# Patient Record
Sex: MALE | Race: WHITE | NOT HISPANIC OR LATINO | Employment: UNEMPLOYED | URBAN - METROPOLITAN AREA
[De-identification: names, ages, dates, MRNs, and addresses within clinical notes are randomized per-mention and may not be internally consistent; named-entity substitution may affect disease eponyms.]

---

## 2017-01-06 ENCOUNTER — ALLSCRIPTS OFFICE VISIT (OUTPATIENT)
Dept: OTHER | Facility: OTHER | Age: 52
End: 2017-01-06

## 2017-01-25 ENCOUNTER — ALLSCRIPTS OFFICE VISIT (OUTPATIENT)
Dept: OTHER | Facility: OTHER | Age: 52
End: 2017-01-25

## 2017-02-16 ENCOUNTER — ALLSCRIPTS OFFICE VISIT (OUTPATIENT)
Dept: OTHER | Facility: OTHER | Age: 52
End: 2017-02-16

## 2017-02-16 DIAGNOSIS — K74.60 CIRRHOSIS OF LIVER (HCC): ICD-10-CM

## 2017-02-16 DIAGNOSIS — B19.20 VIRAL HEPATITIS C WITHOUT HEPATIC COMA: ICD-10-CM

## 2017-03-10 ENCOUNTER — HOSPITAL ENCOUNTER (EMERGENCY)
Facility: HOSPITAL | Age: 52
Discharge: HOME | End: 2017-03-10
Payer: COMMERCIAL

## 2017-03-10 VITALS
RESPIRATION RATE: 18 BRPM | DIASTOLIC BLOOD PRESSURE: 83 MMHG | OXYGEN SATURATION: 95 % | HEART RATE: 84 BPM | BODY MASS INDEX: 28.25 KG/M2 | TEMPERATURE: 96.2 F | WEIGHT: 220 LBS | SYSTOLIC BLOOD PRESSURE: 142 MMHG

## 2017-03-10 DIAGNOSIS — H69.82 EUSTACHIAN TUBE DYSFUNCTION, LEFT: ICD-10-CM

## 2017-03-10 DIAGNOSIS — H93.8X2 SENSATION OF FULLNESS IN LEFT EAR: Primary | ICD-10-CM

## 2017-03-10 PROCEDURE — 99282 EMERGENCY DEPT VISIT SF MDM: CPT

## 2017-03-10 RX ORDER — FEXOFENADINE HCL AND PSEUDOEPHEDRINE HCI 180; 240 MG/1; MG/1
1 TABLET, EXTENDED RELEASE ORAL DAILY
Qty: 30 TABLET | Refills: 0 | Status: SHIPPED | OUTPATIENT
Start: 2017-03-10 | End: 2017-03-10

## 2017-03-10 RX ORDER — AMOXICILLIN AND CLAVULANATE POTASSIUM 875; 125 MG/1; MG/1
1 TABLET, FILM COATED ORAL 2 TIMES DAILY
Qty: 14 TABLET | Refills: 0 | Status: SHIPPED | OUTPATIENT
Start: 2017-03-10 | End: 2017-03-17

## 2017-03-10 RX ORDER — PREDNISONE 20 MG/1
40 TABLET ORAL DAILY
Qty: 10 TABLET | Refills: 0 | Status: SHIPPED | OUTPATIENT
Start: 2017-03-10 | End: 2017-03-15

## 2017-03-10 RX ORDER — FEXOFENADINE HCL AND PSEUDOEPHEDRINE HCI 180; 240 MG/1; MG/1
1 TABLET, EXTENDED RELEASE ORAL DAILY
Qty: 7 TABLET | Refills: 0 | Status: SHIPPED | OUTPATIENT
Start: 2017-03-10 | End: 2017-03-17

## 2017-06-09 ENCOUNTER — ALLSCRIPTS OFFICE VISIT (OUTPATIENT)
Dept: OTHER | Facility: OTHER | Age: 52
End: 2017-06-09

## 2017-06-09 DIAGNOSIS — R07.9 CHEST PAIN: ICD-10-CM

## 2017-06-09 DIAGNOSIS — R00.2 PALPITATIONS: ICD-10-CM

## 2017-06-12 ENCOUNTER — TRANSCRIBE ORDERS (OUTPATIENT)
Dept: ADMINISTRATIVE | Facility: HOSPITAL | Age: 52
End: 2017-06-12

## 2017-06-12 DIAGNOSIS — R00.2 HEART PALPITATIONS: Primary | ICD-10-CM

## 2017-07-03 ENCOUNTER — HOSPITAL ENCOUNTER (OUTPATIENT)
Dept: NON INVASIVE DIAGNOSTICS | Facility: CLINIC | Age: 52
Discharge: HOME | End: 2017-07-03
Payer: COMMERCIAL

## 2017-07-05 ENCOUNTER — HOSPITAL ENCOUNTER (OUTPATIENT)
Dept: NON INVASIVE DIAGNOSTICS | Facility: HOSPITAL | Age: 52
Discharge: HOME | End: 2017-07-05
Payer: COMMERCIAL

## 2017-07-05 VITALS — WEIGHT: 221 LBS | BODY MASS INDEX: 28.37 KG/M2

## 2017-07-05 DIAGNOSIS — R06.00 DYSPNEA: ICD-10-CM

## 2017-07-05 LAB
MAX DIASTOLIC BP: 102 MMHG
MAX HEART RATE: 160 BPM
MAX PREDICTED HEART RATE: 167 BPM
MAX. SYSTOLIC BP: 209 MMHG
PROTOCOL NAME: NORMAL
REASON FOR TERMINATION: NORMAL
TARGET HR FORMULA: NORMAL
TEST INDICATION: NORMAL
TIME IN EXERCISE PHASE: 898 S

## 2017-07-05 PROCEDURE — 93350 STRESS TTE ONLY: CPT

## 2017-07-05 RX ORDER — ATROPINE SULFATE 0.1 MG/ML
0.5 INJECTION INTRAVENOUS ONCE
Status: COMPLETED | OUTPATIENT
Start: 2017-07-05 | End: 2017-07-05

## 2017-07-05 RX ORDER — DOBUTAMINE HYDROCHLORIDE 200 MG/100ML
10 INJECTION INTRAVENOUS CONTINUOUS
Status: DISCONTINUED | OUTPATIENT
Start: 2017-07-05 | End: 2017-07-06 | Stop reason: HOSPADM

## 2017-07-05 RX ORDER — METOPROLOL TARTRATE 5 MG/5ML
5 INJECTION INTRAVENOUS ONCE
Status: COMPLETED | OUTPATIENT
Start: 2017-07-05 | End: 2017-07-05

## 2017-07-05 RX ADMIN — DOBUTAMINE IN DEXTROSE 10 MCG/KG/MIN: 200 INJECTION, SOLUTION INTRAVENOUS at 11:57

## 2017-07-05 RX ADMIN — PERFLUTREN 2 ML/MIN: 6.52 INJECTION, SUSPENSION INTRAVENOUS at 12:00

## 2017-07-05 RX ADMIN — ATROPINE SULFATE 0.5 MG: 0.1 INJECTION INTRAVENOUS at 12:09

## 2017-07-05 RX ADMIN — METOPROLOL TARTRATE 5 MG: 1 INJECTION, SOLUTION INTRAVENOUS at 12:14

## 2017-07-28 ENCOUNTER — ALLSCRIPTS OFFICE VISIT (OUTPATIENT)
Dept: OTHER | Facility: OTHER | Age: 52
End: 2017-07-28

## 2017-08-28 ENCOUNTER — ALLSCRIPTS OFFICE VISIT (OUTPATIENT)
Dept: OTHER | Facility: OTHER | Age: 52
End: 2017-08-28

## 2017-08-28 ENCOUNTER — APPOINTMENT (OUTPATIENT)
Dept: LAB | Facility: CLINIC | Age: 52
End: 2017-08-28
Payer: COMMERCIAL

## 2017-08-28 DIAGNOSIS — Z12.11 ENCOUNTER FOR SCREENING FOR MALIGNANT NEOPLASM OF COLON: ICD-10-CM

## 2017-08-28 DIAGNOSIS — K74.60 CIRRHOSIS OF LIVER (HCC): ICD-10-CM

## 2017-08-28 LAB
ALBUMIN SERPL BCP-MCNC: 3.7 G/DL (ref 3.5–5)
ALP SERPL-CCNC: 53 U/L (ref 46–116)
ALT SERPL W P-5'-P-CCNC: 49 U/L (ref 12–78)
ANION GAP SERPL CALCULATED.3IONS-SCNC: 6 MMOL/L (ref 4–13)
AST SERPL W P-5'-P-CCNC: 22 U/L (ref 5–45)
BASOPHILS # BLD AUTO: 0.04 THOUSANDS/ΜL (ref 0–0.1)
BASOPHILS NFR BLD AUTO: 1 % (ref 0–1)
BILIRUB SERPL-MCNC: 0.4 MG/DL (ref 0.2–1)
BUN SERPL-MCNC: 12 MG/DL (ref 5–25)
CALCIUM SERPL-MCNC: 9.4 MG/DL (ref 8.3–10.1)
CHLORIDE SERPL-SCNC: 110 MMOL/L (ref 100–108)
CO2 SERPL-SCNC: 27 MMOL/L (ref 21–32)
CREAT SERPL-MCNC: 0.88 MG/DL (ref 0.6–1.3)
EOSINOPHIL # BLD AUTO: 0.08 THOUSAND/ΜL (ref 0–0.61)
EOSINOPHIL NFR BLD AUTO: 2 % (ref 0–6)
ERYTHROCYTE [DISTWIDTH] IN BLOOD BY AUTOMATED COUNT: 13.7 % (ref 11.6–15.1)
GFR SERPL CREATININE-BSD FRML MDRD: 98 ML/MIN/1.73SQ M
GLUCOSE P FAST SERPL-MCNC: 90 MG/DL (ref 65–99)
HCT VFR BLD AUTO: 47.5 % (ref 36.5–49.3)
HGB BLD-MCNC: 15.8 G/DL (ref 12–17)
INR PPP: 1.03 (ref 0.86–1.16)
LYMPHOCYTES # BLD AUTO: 1.54 THOUSANDS/ΜL (ref 0.6–4.47)
LYMPHOCYTES NFR BLD AUTO: 29 % (ref 14–44)
MCH RBC QN AUTO: 31 PG (ref 26.8–34.3)
MCHC RBC AUTO-ENTMCNC: 33.3 G/DL (ref 31.4–37.4)
MCV RBC AUTO: 93 FL (ref 82–98)
MONOCYTES # BLD AUTO: 0.46 THOUSAND/ΜL (ref 0.17–1.22)
MONOCYTES NFR BLD AUTO: 9 % (ref 4–12)
NEUTROPHILS # BLD AUTO: 3.26 THOUSANDS/ΜL (ref 1.85–7.62)
NEUTS SEG NFR BLD AUTO: 59 % (ref 43–75)
NRBC BLD AUTO-RTO: 0 /100 WBCS
PLATELET # BLD AUTO: 120 THOUSANDS/UL (ref 149–390)
PMV BLD AUTO: 11.5 FL (ref 8.9–12.7)
POTASSIUM SERPL-SCNC: 4.4 MMOL/L (ref 3.5–5.3)
PROT SERPL-MCNC: 7.6 G/DL (ref 6.4–8.2)
PROTHROMBIN TIME: 13.5 SECONDS (ref 12.1–14.4)
RBC # BLD AUTO: 5.09 MILLION/UL (ref 3.88–5.62)
SODIUM SERPL-SCNC: 143 MMOL/L (ref 136–145)
WBC # BLD AUTO: 5.4 THOUSAND/UL (ref 4.31–10.16)

## 2017-08-28 PROCEDURE — 85610 PROTHROMBIN TIME: CPT

## 2017-08-28 PROCEDURE — 82105 ALPHA-FETOPROTEIN SERUM: CPT

## 2017-08-28 PROCEDURE — 80053 COMPREHEN METABOLIC PANEL: CPT

## 2017-08-28 PROCEDURE — 85025 COMPLETE CBC W/AUTO DIFF WBC: CPT

## 2017-08-28 PROCEDURE — 36415 COLL VENOUS BLD VENIPUNCTURE: CPT

## 2017-08-29 LAB — AFP-TM SERPL-MCNC: 4.2 NG/ML (ref 0–8.3)

## 2017-08-31 ENCOUNTER — APPOINTMENT (OUTPATIENT)
Dept: LAB | Facility: HOSPITAL | Age: 52
End: 2017-08-31
Payer: COMMERCIAL

## 2017-08-31 ENCOUNTER — TRANSCRIBE ORDERS (OUTPATIENT)
Dept: LAB | Facility: HOSPITAL | Age: 52
End: 2017-08-31

## 2017-08-31 DIAGNOSIS — Z12.11 ENCOUNTER FOR SCREENING FOR MALIGNANT NEOPLASM OF COLON: ICD-10-CM

## 2017-08-31 LAB — HEMOCCULT STL QL IA: NEGATIVE

## 2017-08-31 PROCEDURE — G0328 FECAL BLOOD SCRN IMMUNOASSAY: HCPCS

## 2017-09-18 ENCOUNTER — ALLSCRIPTS OFFICE VISIT (OUTPATIENT)
Dept: OTHER | Facility: OTHER | Age: 52
End: 2017-09-18

## 2017-10-19 ENCOUNTER — HOSPITAL ENCOUNTER (EMERGENCY)
Facility: HOSPITAL | Age: 52
Discharge: HOME | End: 2017-10-19
Attending: EMERGENCY MEDICINE | Admitting: EMERGENCY MEDICINE
Payer: COMMERCIAL

## 2017-10-19 ENCOUNTER — APPOINTMENT (EMERGENCY)
Dept: RADIOLOGY | Facility: HOSPITAL | Age: 52
End: 2017-10-19
Payer: COMMERCIAL

## 2017-10-19 VITALS
DIASTOLIC BLOOD PRESSURE: 95 MMHG | HEART RATE: 83 BPM | BODY MASS INDEX: 28.25 KG/M2 | SYSTOLIC BLOOD PRESSURE: 149 MMHG | TEMPERATURE: 98.8 F | WEIGHT: 220 LBS | OXYGEN SATURATION: 97 % | RESPIRATION RATE: 18 BRPM

## 2017-10-19 DIAGNOSIS — M79.604 RIGHT LEG PAIN: Primary | ICD-10-CM

## 2017-10-19 PROCEDURE — 99283 EMERGENCY DEPT VISIT LOW MDM: CPT

## 2017-10-19 PROCEDURE — 73590 X-RAY EXAM OF LOWER LEG: CPT

## 2017-10-19 RX ORDER — NAPROXEN 500 MG/1
TABLET ORAL
Qty: 14 TABLET | Refills: 0 | Status: ON HOLD | OUTPATIENT
Start: 2017-10-19 | End: 2017-11-30 | Stop reason: SDUPTHER

## 2017-10-19 NOTE — DISCHARGE INSTRUCTIONS
Leg Pain   WHAT YOU NEED TO KNOW:   Leg pain may be caused by a variety of health conditions  Your tests did not show any broken bones or blood clots  DISCHARGE INSTRUCTIONS:   Return to the emergency department if:   · You have a fever  · Your leg starts to swell  · Your leg pain gets worse  · You have numbness or tingling in your leg or toes  · You cannot put any weight on or move your leg  Contact your healthcare provider if:   · Your pain does not decrease, even after treatment  · You have questions or concerns about your condition or care  Medicines:   · NSAIDs , such as ibuprofen, help decrease swelling, pain, and fever  This medicine is available with or without a doctor's order  NSAIDs can cause stomach bleeding or kidney problems in certain people  If you take blood thinner medicine, always ask your healthcare provider if NSAIDs are safe for you  Always read the medicine label and follow directions  · Take your medicine as directed  Contact your healthcare provider if you think your medicine is not helping or if you have side effects  Tell him of her if you are allergic to any medicine  Keep a list of the medicines, vitamins, and herbs you take  Include the amounts, and when and why you take them  Bring the list or the pill bottles to follow-up visits  Carry your medicine list with you in case of an emergency  Follow up with your healthcare provider as directed: You may need more tests to find the cause of your leg pain  You may need to see an orthopedic specialist or a physical therapist  Write down your questions so you remember to ask them during your visits  Manage your leg pain:   · Rest  your injured leg so that it can heal  You may need an immobilizer, brace, or splint to limit the movement of your leg  You may need to avoid putting any weight on your leg for at least 48 hours  Return to normal activities as directed      · Ice  the injury for 20 minutes every 4 hours for up to 24 hours, or as directed  Use an ice pack, or put crushed ice in a plastic bag  Cover it with a towel to protect your skin  Ice helps prevent tissue damage and decreases swelling and pain  · Elevate  your injured leg above the level of your heart as often as you can  This will help decrease swelling and pain  If possible, prop your leg on pillows or blankets to keep the area elevated comfortably  · Use assistive devices as directed  You may need to use a cane or crutches  Assistive devices help decrease pain and pressure on your leg when you walk  Ask your healthcare provider for more information about assistive devices and how to use them correctly  · Maintain a healthy weight  Extra body weight can cause pressure and pain in your hip, knee, and ankle joints  Ask your healthcare provider how much you should weigh  Ask him to help you create a weight loss plan if you are overweight  © 2017 [address] [employer] Information is for End User's use only and may not be sold, redistributed or otherwise used for commercial purposes  All illustrations and images included in CareNotes® are the copyrighted property of [employer] , [employer]  or Durga Webber  The above information is an  only  It is not intended as medical advice for individual conditions or treatments  Talk to your doctor, nurse or pharmacist before following any medical regimen to see if it is safe and effective for you
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
## 2017-11-29 ENCOUNTER — ANESTHESIA EVENT (OUTPATIENT)
Dept: GASTROENTEROLOGY | Facility: HOSPITAL | Age: 52
End: 2017-11-29
Payer: COMMERCIAL

## 2017-11-29 NOTE — ANESTHESIA PREPROCEDURE EVALUATION
Review of Systems/Medical History  Patient summary reviewed  Chart reviewed  No history of anesthetic complications     Cardiovascular  Hypertension , Past MI (Per pt, "slight MI") ,    Pulmonary  Smoker (1/2 ppd, smoked 4 cigarettes this am) cigarette smoker , COPD moderate- medication dependent , ,        GI/Hepatic    Hepatitis C and Chronic,   Comment: Peterson's esophagus     Negative  ROS        Endo/Other  Negative endo/other ROS      GYN  Negative gynecology ROS          Hematology  Negative hematology ROS      Musculoskeletal    Comment: Chronic pain--cervical region, pt says he "broke" his neck several times, no surgery      Neurology  Negative neurology ROS      Psychology   Negative psychology ROS            Physical Exam    Airway    Mallampati score: II  TM Distance: >3 FB       Dental   Comment: Missing teeth,     Cardiovascular  Rhythm: regular, Rate: normal,     Pulmonary  Breath sounds clear to auscultation,     Other Findings        Anesthesia Plan  ASA Score- 3       Anesthesia Type- IV sedation with anesthesia with ASA Monitors  Additional Monitors:   Airway Plan:           Induction- intravenous  Informed Consent- Anesthetic plan and risks discussed with patient  I personally reviewed this patient with the CRNA  Discussed and agreed on the Anesthesia Plan with the CRNA  Beth Joe

## 2017-11-30 ENCOUNTER — GENERIC CONVERSION - ENCOUNTER (OUTPATIENT)
Dept: GASTROENTEROLOGY | Facility: CLINIC | Age: 52
End: 2017-11-30

## 2017-11-30 ENCOUNTER — ANESTHESIA (OUTPATIENT)
Dept: GASTROENTEROLOGY | Facility: HOSPITAL | Age: 52
End: 2017-11-30
Payer: COMMERCIAL

## 2017-11-30 ENCOUNTER — HOSPITAL ENCOUNTER (OUTPATIENT)
Facility: HOSPITAL | Age: 52
Setting detail: OUTPATIENT SURGERY
Discharge: HOME | End: 2017-11-30
Attending: INTERNAL MEDICINE | Admitting: INTERNAL MEDICINE
Payer: COMMERCIAL

## 2017-11-30 ENCOUNTER — GENERIC CONVERSION - ENCOUNTER (OUTPATIENT)
Dept: INTERNAL MEDICINE CLINIC | Facility: CLINIC | Age: 52
End: 2017-11-30

## 2017-11-30 VITALS
TEMPERATURE: 96.6 F | DIASTOLIC BLOOD PRESSURE: 87 MMHG | HEART RATE: 71 BPM | RESPIRATION RATE: 16 BRPM | WEIGHT: 220 LBS | BODY MASS INDEX: 28.23 KG/M2 | SYSTOLIC BLOOD PRESSURE: 144 MMHG | OXYGEN SATURATION: 98 % | HEIGHT: 74 IN

## 2017-11-30 RX ORDER — ONDANSETRON 2 MG/ML
INJECTION INTRAMUSCULAR; INTRAVENOUS AS NEEDED
Status: DISCONTINUED | OUTPATIENT
Start: 2017-11-30 | End: 2017-11-30 | Stop reason: SURG

## 2017-11-30 RX ORDER — PROPOFOL 10 MG/ML
INJECTION, EMULSION INTRAVENOUS AS NEEDED
Status: DISCONTINUED | OUTPATIENT
Start: 2017-11-30 | End: 2017-11-30 | Stop reason: SURG

## 2017-11-30 RX ORDER — GLYCOPYRROLATE 0.2 MG/ML
INJECTION INTRAMUSCULAR; INTRAVENOUS AS NEEDED
Status: DISCONTINUED | OUTPATIENT
Start: 2017-11-30 | End: 2017-11-30 | Stop reason: SURG

## 2017-11-30 RX ORDER — SODIUM CHLORIDE 9 MG/ML
50 INJECTION, SOLUTION INTRAVENOUS CONTINUOUS
Status: DISCONTINUED | OUTPATIENT
Start: 2017-11-30 | End: 2017-11-30 | Stop reason: HOSPADM

## 2017-11-30 RX ORDER — PROPOFOL 10 MG/ML
INJECTION, EMULSION INTRAVENOUS CONTINUOUS PRN
Status: DISCONTINUED | OUTPATIENT
Start: 2017-11-30 | End: 2017-11-30 | Stop reason: SURG

## 2017-11-30 RX ADMIN — PROPOFOL 100 MCG/KG/MIN: 10 INJECTION, EMULSION INTRAVENOUS at 13:57

## 2017-11-30 RX ADMIN — GLYCOPYRROLATE 0.1 MG: 0.2 INJECTION, SOLUTION INTRAMUSCULAR; INTRAVENOUS at 13:58

## 2017-11-30 RX ADMIN — SODIUM CHLORIDE: 0.9 INJECTION, SOLUTION INTRAVENOUS at 13:42

## 2017-11-30 RX ADMIN — SODIUM CHLORIDE 50 ML/HR: 0.9 INJECTION, SOLUTION INTRAVENOUS at 13:05

## 2017-11-30 RX ADMIN — PROPOFOL 60 MG: 10 INJECTION, EMULSION INTRAVENOUS at 13:58

## 2017-11-30 RX ADMIN — ONDANSETRON 4 MG: 2 INJECTION INTRAMUSCULAR; INTRAVENOUS at 14:00

## 2017-11-30 RX ADMIN — PROPOFOL 50 MG: 10 INJECTION, EMULSION INTRAVENOUS at 14:00

## 2017-11-30 NOTE — ANESTHESIA POSTPROCEDURE EVALUATION
Post-Op Assessment Note      CV Status:  Stable    Mental Status:  Alert    PONV Controlled:  Controlled    Airway Patency:  Patent    Post Op Vitals Reviewed: Yes          Staff: CRNA           /72 (11/30/17 1411)    Temp     Pulse 73 (11/30/17 1411)   Resp 18 (11/30/17 1411)    SpO2 98 % (11/30/17 1411)

## 2017-11-30 NOTE — OP NOTE
**** GI/ENDOSCOPY REPORT ****     PATIENT NAME: Jacky Casiano - VISIT ID:  Patient ID: [id number]   YOB: 1965     INTRODUCTION: Esophagogastroduodenoscopy - A 47 male patient presents for   an outpatient Esophagogastroduodenoscopy at [address] [place] [address]  INDICATIONS: Known Peterson's esophagus  Cirrhosis  CONSENT: The benefits, risks, and alternatives to the procedure were   discussed and informed consent was obtained from the patient  PREPARATION:  EKG, pulse, pulse oximetry and blood pressure were monitored   throughout the procedure  MEDICATIONS: Anesthesia-check records     PROCEDURE:  The endoscope was passed without difficulty through the mouth   under direct visualization and advanced to the 2nd portion of the   duodenum  The scope was withdrawn and the mucosa was carefully examined  FINDINGS:   Esophagus: Small varices were found in the distal esophagus  Known Peterson's esophagus was found in the distal third of the esophagus,   short segment, not biopsied due to cirrhosis  Stomach: The stomach   appeared to be normal on direct and retroflexed views  Duodenum: The 1st   portion of the duodenum and 2nd portion of the duodenum appeared to be   normal      COMPLICATIONS: There were no complications  IMPRESSIONS: Small Esophageal varices found  Known short segment Peterson's   esophagus note  Not biopsied  Normal rest of the stomach  Normal 1st   portion of the duodenum and 2nd portion of the duodenum  RECOMMENDATIONS: Repeat EGD in 2 years for screening purposes  ESTIMATED BLOOD LOSS:     PATHOLOGY SPECIMENS: 4-quadrant biopsies taken  PROCEDURE CODES:     ICD-9 Codes: 530 85 Peterson's esophagus     ICD-10 Codes: I85 0 Esophageal varices K22 7 Peterson's esophagus     PERFORMED BY: LUZ Perera  on 11/30/2017  Version 1, electronically signed by LUZ Arvizu  on   11/30/2017 at 14:08

## 2017-12-20 ENCOUNTER — HOSPITAL ENCOUNTER (EMERGENCY)
Facility: HOSPITAL | Age: 52
Discharge: HOME | End: 2017-12-20
Attending: EMERGENCY MEDICINE | Admitting: EMERGENCY MEDICINE
Payer: COMMERCIAL

## 2017-12-20 VITALS
HEART RATE: 74 BPM | SYSTOLIC BLOOD PRESSURE: 134 MMHG | RESPIRATION RATE: 18 BRPM | DIASTOLIC BLOOD PRESSURE: 78 MMHG | OXYGEN SATURATION: 97 % | TEMPERATURE: 97.9 F

## 2017-12-20 DIAGNOSIS — Z76.0 MEDICATION REFILL: ICD-10-CM

## 2017-12-20 DIAGNOSIS — J32.9 SINUSITIS: Primary | ICD-10-CM

## 2017-12-20 PROCEDURE — 99283 EMERGENCY DEPT VISIT LOW MDM: CPT

## 2017-12-20 RX ORDER — NAPROXEN 500 MG/1
500 TABLET ORAL 2 TIMES DAILY WITH MEALS
Qty: 30 TABLET | Refills: 0 | Status: SHIPPED | OUTPATIENT
Start: 2017-12-20 | End: 2018-01-23

## 2017-12-20 RX ORDER — AMOXICILLIN AND CLAVULANATE POTASSIUM 875; 125 MG/1; MG/1
1 TABLET, FILM COATED ORAL EVERY 12 HOURS
Qty: 20 TABLET | Refills: 0 | Status: SHIPPED | OUTPATIENT
Start: 2017-12-20 | End: 2017-12-30

## 2017-12-20 RX ORDER — NEOMYCIN SULFATE, POLYMYXIN B SULFATE AND HYDROCORTISONE 10; 3.5; 1 MG/ML; MG/ML; [USP'U]/ML
4 SUSPENSION/ DROPS AURICULAR (OTIC) 4 TIMES DAILY
Qty: 10 ML | Refills: 1 | Status: SHIPPED | OUTPATIENT
Start: 2017-12-20 | End: 2018-01-23

## 2017-12-20 RX ORDER — FLUTICASONE PROPIONATE 50 MCG
2 SPRAY, SUSPENSION (ML) NASAL DAILY
Qty: 1 BOTTLE | Refills: 0 | Status: SHIPPED | OUTPATIENT
Start: 2017-12-20 | End: 2018-01-23

## 2017-12-20 NOTE — DISCHARGE INSTRUCTIONS

## 2017-12-20 NOTE — ED PROVIDER NOTES
History  Chief Complaint   Patient presents with    Headache     Pt presents to the ED for evaluation of a "migraine" for 7 days, reports hx of migraines, reoprts nausea and sinus pressure "my ears are draining to"      Patient is a 40-year-old male  He has history of migraines  He also has history of sinus problems  He has been complaining of a sinus headache for about a week  He has been congested  He tried a nasal spray without relief  No fever or chills  No new neck pain  No mental status changes  No abrupt onset of maximal headache  No focal motor or sensory complaints  No nausea or vomiting  In addition patient has problems with drainage from his ears  He is usually treated with Cortisporin  He has run out  Prior to Admission Medications   Prescriptions Last Dose Informant Patient Reported? Taking? albuterol (PROVENTIL HFA,VENTOLIN HFA) 90 mcg/act inhaler  Self Yes Yes   Sig: Inhale 2 puffs every 4 (four) hours as needed for wheezing  atorvastatin (LIPITOR) 20 mg tablet  Self Yes Yes   Sig: Take 20 mg by mouth daily  omeprazole (PriLOSEC) 20 mg delayed release capsule  Self Yes Yes   Sig: Take 20 mg by mouth daily  Facility-Administered Medications: None       Past Medical History:   Diagnosis Date    Peterson's esophagus     COPD (chronic obstructive pulmonary disease) (HCC)     Hep C w/o coma, chronic (HCC)     treated    History of neck problems     Hypertension        Past Surgical History:   Procedure Laterality Date    APPENDECTOMY      ESOPHAGOGASTRODUODENOSCOPY      MT ESOPHAGOGASTRODUODENOSCOPY TRANSORAL DIAGNOSTIC N/A 11/30/2017    Procedure: ESOPHAGOGASTRODUODENOSCOPY (EGD); Surgeon: Claudy Curran DO;  Location: [facility]; Service: Gastroenterology       History reviewed  No pertinent family history  I have reviewed and agree with the history as documented      Social History   Substance Use Topics    Smoking status: Current Every Day Smoker Packs/day: 0 50     Types: Cigarettes    Smokeless tobacco: Never Used      Comment: since 14 yrs old    Alcohol use No        Review of Systems   Constitutional: Negative for chills and fever  HENT: Positive for congestion, rhinorrhea, sinus pain and sinus pressure  Negative for facial swelling and sore throat  Eyes: Negative for pain, redness and visual disturbance  Respiratory: Negative for cough and shortness of breath  Cardiovascular: Negative for chest pain and leg swelling  Gastrointestinal: Negative for abdominal pain, diarrhea and vomiting  Endocrine: Negative for polydipsia and polyuria  Genitourinary: Negative for dysuria, frequency and hematuria  Musculoskeletal: Negative for back pain  Skin: Negative for rash and wound  Allergic/Immunologic: Negative for immunocompromised state  Neurological: Positive for headaches  Negative for weakness and numbness  Psychiatric/Behavioral: Negative for hallucinations and suicidal ideas  All other systems reviewed and are negative  Physical Exam  ED Triage Vitals   Temperature Pulse Respirations Blood Pressure SpO2   12/20/17 1146 12/20/17 1146 12/20/17 1146 12/20/17 1146 12/20/17 1146   97 9 °F (36 6 °C) 78 18 137/89 98 %      Temp Source Heart Rate Source Patient Position - Orthostatic VS BP Location FiO2 (%)   12/20/17 1146 12/20/17 1146 12/20/17 1146 12/20/17 1146 --   Oral Monitor Sitting Left arm       Pain Score       12/20/17 1325       5           Orthostatic Vital Signs  Vitals:    12/20/17 1146   BP: 137/89   Pulse: 78   Patient Position - Orthostatic VS: Sitting       Physical Exam   Constitutional: He is oriented to person, place, and time  He appears well-developed and well-nourished  No distress  HENT:   Head: Normocephalic and atraumatic  There is sinus tenderness  Tympanic membranes are normal  No external canal swelling or exudate     Eyes: Conjunctivae and EOM are normal  Pupils are equal, round, and reactive to light  Right eye exhibits no discharge  Left eye exhibits no discharge  No scleral icterus  Neck: Normal range of motion  Neck supple  Cardiovascular: Normal rate, regular rhythm, normal heart sounds and intact distal pulses  Exam reveals no gallop and no friction rub  No murmur heard  Pulmonary/Chest: Effort normal and breath sounds normal  No respiratory distress  He has no wheezes  He has no rales  Abdominal: Soft  Bowel sounds are normal  He exhibits no distension  There is no tenderness  There is no rebound and no guarding  Musculoskeletal: Normal range of motion  He exhibits no edema, tenderness or deformity  Neurological: He is alert and oriented to person, place, and time  He has normal strength  No cranial nerve deficit or sensory deficit  GCS eye subscore is 4  GCS verbal subscore is 5  GCS motor subscore is 6  Skin: Skin is warm and dry  No rash noted  He is not diaphoretic  Psychiatric: He has a normal mood and affect  His behavior is normal    Vitals reviewed  ED Medications  Medications - No data to display    Diagnostic Studies  Results Reviewed     None                 No orders to display              Procedures  Procedures       Phone Contacts  ED Phone Contact    ED Course  ED Course                                MDM  Number of Diagnoses or Management Options  Diagnosis management comments: Doubt subarachnoid hemorrhage  Not abrupt onset of maximal headache  No meningeal signs to suggest meningitis  Most likely this is a sinus headache  CritCare Time    Disposition  Final diagnoses:   Sinusitis     Time reflects when diagnosis was documented in both MDM as applicable and the Disposition within this note     Time User Action Codes Description Comment    12/20/2017  1:30 PM Elizabeth Abad [J32 [id number]] Sinusitis       ED Disposition     ED Disposition Condition Comment    Discharge  Farhad Sanchez discharge to home/self care      Condition at discharge: Good Follow-up Information     Follow up With Specialties Details Why Carmela Crigler, MD Otolaryngology In 1 week  [address]  [phone]      Moira Martinez MD Otolaryngology In 1 week  [facility]  [address]  [phone]          Patient's Medications   Discharge Prescriptions    AMOXICILLIN-CLAVULANATE (AUGMENTIN) 875-125 MG PER TABLET    Take 1 tablet by mouth every 12 (twelve) hours for 10 days       Start Date: 12/20/2017End Date: 12/30/2017       Order Dose: 1 tablet       Quantity: 20 tablet    Refills: 0    FLUTICASONE (FLONASE) 50 MCG/ACT NASAL SPRAY    2 sprays into each nostril daily       Start Date: 12/20/2017End Date: --       Order Dose: 2 sprays       Quantity: 1 Bottle    Refills: 0    NAPROXEN (NAPROSYN) 500 MG TABLET    Take 1 tablet by mouth 2 (two) times a day with meals Prn pain       Start Date: 12/20/2017End Date: --       Order Dose: 500 mg       Quantity: 30 tablet    Refills: 0    NEOMYCIN-POLYMYXIN-HYDROCORTISONE (CORTISPORIN) 0 35%-10,000 UNITS/ML-1% OTIC SUSPENSION    Administer 4 drops into ears 4 (four) times a day       Start Date: 12/20/2017End Date: --       Order Dose: 4 drops       Quantity: 10 mL    Refills: 1     No discharge procedures on file      ED Provider  Electronically Signed by           Tamara Mora MD  12/20/17 1624

## 2017-12-24 ENCOUNTER — HOSPITAL ENCOUNTER (EMERGENCY)
Facility: HOSPITAL | Age: 52
Discharge: HOME | End: 2017-12-24
Attending: EMERGENCY MEDICINE | Admitting: EMERGENCY MEDICINE
Payer: COMMERCIAL

## 2017-12-24 VITALS
SYSTOLIC BLOOD PRESSURE: 171 MMHG | WEIGHT: 223 LBS | TEMPERATURE: 98.1 F | RESPIRATION RATE: 18 BRPM | OXYGEN SATURATION: 98 % | DIASTOLIC BLOOD PRESSURE: 100 MMHG | HEART RATE: 69 BPM | BODY MASS INDEX: 28.63 KG/M2

## 2017-12-24 DIAGNOSIS — G43.909 MIGRAINE: Primary | ICD-10-CM

## 2017-12-24 PROCEDURE — 96361 HYDRATE IV INFUSION ADD-ON: CPT

## 2017-12-24 PROCEDURE — 99283 EMERGENCY DEPT VISIT LOW MDM: CPT

## 2017-12-24 PROCEDURE — 96375 TX/PRO/DX INJ NEW DRUG ADDON: CPT

## 2017-12-24 PROCEDURE — 96374 THER/PROPH/DIAG INJ IV PUSH: CPT

## 2017-12-24 RX ORDER — METOCLOPRAMIDE HYDROCHLORIDE 5 MG/ML
10 INJECTION INTRAMUSCULAR; INTRAVENOUS ONCE
Status: COMPLETED | OUTPATIENT
Start: 2017-12-24 | End: 2017-12-24

## 2017-12-24 RX ORDER — DIPHENHYDRAMINE HYDROCHLORIDE 50 MG/ML
25 INJECTION INTRAMUSCULAR; INTRAVENOUS ONCE
Status: COMPLETED | OUTPATIENT
Start: 2017-12-24 | End: 2017-12-24

## 2017-12-24 RX ORDER — KETOROLAC TROMETHAMINE 30 MG/ML
15 INJECTION, SOLUTION INTRAMUSCULAR; INTRAVENOUS ONCE
Status: COMPLETED | OUTPATIENT
Start: 2017-12-24 | End: 2017-12-24

## 2017-12-24 RX ORDER — IBUPROFEN 600 MG/1
600 TABLET ORAL EVERY 6 HOURS PRN
Qty: 30 TABLET | Refills: 0 | Status: SHIPPED | OUTPATIENT
Start: 2017-12-24 | End: 2018-01-23

## 2017-12-24 RX ADMIN — KETOROLAC TROMETHAMINE 15 MG: 30 INJECTION, SOLUTION INTRAMUSCULAR at 11:12

## 2017-12-24 RX ADMIN — DIPHENHYDRAMINE HYDROCHLORIDE 25 MG: 50 INJECTION, SOLUTION INTRAMUSCULAR; INTRAVENOUS at 11:12

## 2017-12-24 RX ADMIN — METOCLOPRAMIDE 10 MG: 5 INJECTION, SOLUTION INTRAMUSCULAR; INTRAVENOUS at 11:12

## 2017-12-24 RX ADMIN — SODIUM CHLORIDE 1000 ML: 0.9 INJECTION, SOLUTION INTRAVENOUS at 11:13

## 2017-12-24 NOTE — ED PROVIDER NOTES
History  Chief Complaint   Patient presents with    Migraine     Pt states that he has had a HA and nausea for 12 days  Pt was seen at [facility] 5 days ago and given medicine for URI symptoms     71-year-old male history of migraine comes to the emergency department for evaluation of migraine  Patient states approximately 4 days ago he was at [address] [place] [address] he was diagnosed with sinusitis and was given Augmentin and Cortisporin ear drops for his chronic ear infections  Patient has been compliant with his medications  Patient states he is here today because his headache is not getting better  Patient states the headache started 12 days ago, gradual in onset, throbbing, usually left-sided, and not made better by naproxen  Patient denies any fever or neck pain  Patient denies any trauma  Otherwise, patient denying chest pain shortness of breath vomiting abdominal pain dysuria constipation or diarrhea  Medical management decision making:  Patient presented what is likely a migraine I will treat him with normal saline 15 of Toradol 10 of Reglan and 25 of Benadryl I will also put patient on non-rebreather mask in case there is cluster headache component to this  Prior to Admission Medications   Prescriptions Last Dose Informant Patient Reported? Taking? albuterol (PROVENTIL HFA,VENTOLIN HFA) 90 mcg/act inhaler Past Week at Unknown time Self Yes Yes   Sig: Inhale 2 puffs every 4 (four) hours as needed for wheezing  amoxicillin-clavulanate (AUGMENTIN) 875-125 mg per tablet 2017 at Unknown time  No Yes   Sig: Take 1 tablet by mouth every 12 (twelve) hours for 10 days   atorvastatin (LIPITOR) 20 mg tablet 2017 at Unknown time Self Yes Yes   Sig: Take 20 mg by mouth daily     fluticasone (FLONASE) 50 mcg/act nasal spray 2017 at Unknown time  No Yes   Si sprays into each nostril daily   naproxen (NAPROSYN) 500 mg tablet 2017 at Unknown time  No Yes   Sig: Take 1 tablet by mouth 2 (two) times a day with meals Prn pain   neomycin-polymyxin-hydrocortisone (CORTISPORIN) 0 35%-10,000 units/mL-1% otic suspension 12/23/2017 at Unknown time  No Yes   Sig: Administer 4 drops into ears 4 (four) times a day   omeprazole (PriLOSEC) 20 mg delayed release capsule 12/23/2017 at Unknown time Self Yes Yes   Sig: Take 20 mg by mouth daily  Facility-Administered Medications: None       Past Medical History:   Diagnosis Date    Peterson's esophagus     [facility]ch[facility] obstructive pulmonary disease[facility])     Hep C w/o coma, chronic (HCC)     treated    History of neck problems     Hypertension        Past Surgical History:   Procedure Laterality Date    APPENDECTOMY      ESOPHAGOGASTRODUODENOSCOPY      NC ESOPHAGOGASTRODUODENOSCOPY TRANSORAL DIAGNOSTIC N/A 11/30/2017    Procedure: ESOPHAGOGASTRODUODENOSCOPY (EGD); Surgeon: Aleksander Hernandez DO;  Location: [facility]; Service: Gastroenterology       History reviewed  No pertinent family history  I have reviewed and agree with the history as documented  Social History   Substance Use Topics    Smoking status: Current Every Day Smoker     Packs/day: 0 50     Types: Cigarettes    Smokeless tobacco: Current User      Comment: since 14 yrs old    Alcohol use No        Review of Systems   Constitutional: Negative for appetite change, chills, diaphoresis, fatigue and fever  HENT: Negative for congestion, ear discharge, ear pain, hearing loss, postnasal drip, rhinorrhea, sneezing and sore throat  Eyes: Negative for pain, discharge and redness  Respiratory: Negative for cough, choking, chest tightness, shortness of breath, wheezing and stridor  Cardiovascular: Negative for chest pain and palpitations  Gastrointestinal: Negative for abdominal distention, abdominal pain, blood in stool, constipation, diarrhea, nausea and vomiting     Genitourinary: Negative for decreased urine volume, difficulty urinating, dysuria, flank pain, frequency and hematuria  Musculoskeletal: Negative for arthralgias, gait problem, joint swelling and neck pain  Skin: Negative for color change, pallor and rash  Allergic/Immunologic: Negative for environmental allergies, food allergies and immunocompromised state  Neurological: Positive for headaches  Negative for dizziness, seizures, weakness, light-headedness and numbness  Hematological: Negative for adenopathy  Does not bruise/bleed easily  Psychiatric/Behavioral: Negative for agitation and behavioral problems  Physical Exam  ED Triage Vitals [12/24/17 0949]   Temperature Pulse Respirations Blood Pressure SpO2   98 1 °F (36 7 °C) 88 18 165/98 98 %      Temp Source Heart Rate Source Patient Position - Orthostatic VS BP Location FiO2 (%)   Tympanic Monitor Sitting Left arm --      Pain Score       5           Orthostatic Vital Signs  Vitals:    12/24/17 0949 12/24/17 1209   BP: 165/98 (!) 171/100   Pulse: 88 69   Patient Position - Orthostatic VS: Sitting Lying       Physical Exam   Constitutional: He is oriented to person, place, and time  He appears well-developed and well-nourished  HENT:   Head: Normocephalic and atraumatic  Nose: Nose normal    Mouth/Throat: Oropharynx is clear and moist    Eyes: Conjunctivae and EOM are normal  Pupils are equal, round, and reactive to light  Neck: Normal range of motion  Neck supple  Cardiovascular: Normal rate, regular rhythm and normal heart sounds  Exam reveals no gallop and no friction rub  No murmur heard  Pulmonary/Chest: Effort normal and breath sounds normal  No respiratory distress  He has no wheezes  He has no rales  Abdominal: Soft  Bowel sounds are normal  He exhibits no distension  There is no tenderness  There is no rebound and no guarding  Musculoskeletal: Normal range of motion  Neurological: He is alert and oriented to person, place, and time  He has normal strength  No cranial nerve deficit or sensory deficit   He displays a negative Romberg sign  Skin: Skin is warm and dry  Psychiatric: He has a normal mood and affect  His behavior is normal    Nursing note and vitals reviewed  ED Medications  Medications   ketorolac (TORADOL) injection 15 mg (15 mg Intravenous Given 12/24/17 1112)   metoclopramide (REGLAN) injection 10 mg (10 mg Intravenous Given 12/24/17 1112)   diphenhydrAMINE (BENADRYL) injection 25 mg (25 mg Intravenous Given 12/24/17 1112)   sodium chloride 0 9 % bolus 1,000 mL (0 mL Intravenous Stopped 12/24/17 1210)       Diagnostic Studies  Results Reviewed     None                 No orders to display         Procedures  Procedures      Phone Consults  ED Phone Contact    ED Course  ED Course as of Dec 24 1819   Sun Dec 24, 2017   1148 Patient feels better                                MDM  CritCare Time    Disposition  Final diagnoses:   Migraine     Time reflects when diagnosis was documented in both MDM as applicable and the Disposition within this note     Time User Action Codes Description Comment    12/24/2017 11:47 AM Dinah Flores Add [G43 909] Migraine       ED Disposition     ED Disposition Condition Comment    Discharge  [facility] discharge to home/self care  Condition at discharge: Stable        Follow-up Information     Follow up With Specialties Details Why [address]  In 3 days  [address] [place] [address]  [phone]          Discharge Medication List as of 12/24/2017 11:47 AM      CONTINUE these medications which have NOT CHANGED    Details   albuterol (PROVENTIL HFA,VENTOLIN HFA) 90 mcg/act inhaler Inhale 2 puffs every 4 (four) hours as needed for wheezing , Historical Med      amoxicillin-clavulanate (AUGMENTIN) 875-125 mg per tablet Take 1 tablet by mouth every 12 (twelve) hours for 10 days, Starting Wed 12/20/2017, Until Sat 12/30/2017, Print      atorvastatin (LIPITOR) 20 mg tablet Take 20 mg by mouth daily  , Historical Med      fluticasone (FLONASE) 50 mcg/act nasal spray 2 sprays into each nostril daily, Starting Wed 12/20/2017, Print      naproxen (NAPROSYN) 500 mg tablet Take 1 tablet by mouth 2 (two) times a day with meals Prn pain, Starting Wed 12/20/2017, Print      neomycin-polymyxin-hydrocortisone (CORTISPORIN) 0 35%-10,000 units/mL-1% otic suspension Administer 4 drops into ears 4 (four) times a day, Starting Wed 12/20/2017, Print      omeprazole (PriLOSEC) 20 mg delayed release capsule Take 20 mg by mouth daily  , Historical Med           No discharge procedures on file  ED Provider  Attending physically available and evaluated Raciel [facility]  I managed the patient along with the ED Attending      Electronically Signed by         Karishma Rose MD  Resident  12/24/17 9054

## 2017-12-24 NOTE — ED ATTENDING ATTESTATION
Ashish Barron MD, saw and evaluated the patient  I have discussed the patient with the resident/non-physician practitioner and agree with the resident's/non-physician practitioner's findings, Plan of Care, and MDM as documented in the resident's/non-physician practitioner's note, except where noted  All available labs and Radiology studies were reviewed  At this point I agree with the current assessment done in the Emergency Department  I have conducted an independent evaluation of this patient a history and physical is as follows: This 41-year-old male presents with a protracted headache  Patient states symptoms began more than a week ago, got worse approximately four days ago  Patient was seen in outside hospital and was diagnosed with sinusitis for which patient has been taking Flonase and Augmentin  Patient states despite this his headache continues  Patient denies any change in the type of headache, has had no vomiting or fevers, no fall or injury, no neurologic changes  On exam patient is awake and alert sitting in a well-lit room and does not appear in any distress  Patient is neurologically intact  Plan: We will treat this patient symptomatically and provide oxygen as well  Disposition pending improvement in symptoms  Patient felt better after medications and oxygen administered here  Patient will be discharged home    Critical Care Time  CritCare Time    Procedures

## 2017-12-24 NOTE — DISCHARGE INSTRUCTIONS
Follow up with primary care doctor in 3-5 days  If your symptoms continue, or if there are ANY concerns, return to the ED immediately  Migraine Headache   WHAT YOU NEED TO KNOW:   A migraine is a severe headache  The pain can be so severe that it interferes with your daily activities  A migraine can last a few hours up to several days  The exact cause of migraines is not known  DISCHARGE INSTRUCTIONS:   Return to the emergency department if:   · You have a headache that seems different or much worse than your usual migraine headache  · You have a severe headache with a fever or a stiff neck  · You have new problems with speech, vision, balance, or movement  · You feel like you are going to faint, you become confused, or you have a seizure  Contact your healthcare provider or neurologist if:   · Your migraines interfere with your daily activities  · Your medicines or treatments stop working  · You have questions or concerns about your condition or care  Medicines: You may need any of the following  Take medicine as soon as you feel a migraine begin  · Prescription pain medicine  may be given  Do not wait until the pain is severe before you take your medicine  · Migraine medicines  are used to help prevent a migraine or stop it once it starts  · Antinausea medicine  may be given to calm your stomach and to help prevent vomiting  This medicine can also help relieve pain  · Take your medicine as directed  Contact your healthcare provider if you think your medicine is not helping or if you have side effects  Tell him or her if you are allergic to any medicine  Keep a list of the medicines, vitamins, and herbs you take  Include the amounts, and when and why you take them  Bring the list or the pill bottles to follow-up visits  Carry your medicine list with you in case of an emergency  Manage your symptoms:   · Rest in a dark, quiet room  This will help decrease your pain   Sleep may also help relieve the pain  · Apply ice to decrease pain  Use an ice pack, or put crushed ice in a plastic bag  Cover the ice pack with a towel and place it on your head  Apply ice for 15 to 20 minutes every hour  · Apply heat to decrease pain and muscle spasms  Use a small towel dampened with warm water or a heating pad, or sit in a warm bath  Apply heat on the area for 20 to 30 minutes every 2 hours  You may alternate heat and ice  · Keep a migraine record  Write down when your migraines start and stop  Include your symptoms and what you were doing when a migraine began  Record what you ate or drank for 24 hours before the migraine started  Keep track of what you did to treat your migraine and if it worked  Bring the migraine record with you to visits with your healthcare provider  Follow up with your healthcare provider or neurologist as directed:  Bring your migraine record with you  Write down your questions so you remember to ask them during your visits  Prevent another migraine:   · Do not smoke  Nicotine and other chemicals in cigarettes and cigars can trigger a migraine or make it worse  Ask your healthcare provider for information if you currently smoke and need help to quit  E-cigarettes or smokeless tobacco still contain nicotine  Talk to your healthcare provider before you use these products  · Do not drink alcohol  Alcohol can trigger a migraine  It can also keep medicines used to treat your migraines from working  · Get regular exercise  Exercise may help prevent migraines  Talk to your healthcare provider about the best exercise plan for you  Try to get at least 30 minutes of exercise on most days  · Manage stress  Stress may trigger a migraine  Learn new ways to relax, such as deep breathing  · Create a sleep schedule  Go to bed and get up at the same times each day  Do not watch television before bed  · Eat regular meals    Include healthy foods such as include fruit, vegetables, whole-grain breads, low-fat dairy products, beans, lean meat, and fish  Do not have food or drinks that trigger your migraines  © 2017 [address] [employer] Information is for End User's use only and may not be sold, redistributed or otherwise used for commercial purposes  All illustrations and images included in CareNotes® are the copyrighted property of [employer]  or [address]  The above information is an  only  It is not intended as medical advice for individual conditions or treatments  Talk to your doctor, nurse or pharmacist before following any medical regimen to see if it is safe and effective for you

## 2017-12-27 ENCOUNTER — GENERIC CONVERSION - ENCOUNTER (OUTPATIENT)
Dept: OTHER | Facility: OTHER | Age: 52
End: 2017-12-27

## 2018-01-09 NOTE — MISCELLANEOUS
Message   Recorded as Task   Date: 09/06/2016 03:39 PM, Created By: [facility]   Task Name: Follow Up   Assigned To: Cora Harris   Regarding Patient: Carmen Delarosa, Status: Active   CommentGrace Ramirez - 06 Sep 1656 4:28 PM     TASK CREATED  Pt would like to schedule procedure - last OV you discussed MB or EJ - Patient states at that time pain was more on the left but now it is on both he states he feels like he has a cinder clock on his head  Please advise    Thanks   Angelito Bradley - 09 Sep 2016 2:25 PM     TASK REPLIED TO: Previously Assigned To Marcie Hanson  can schedule b/l C4-6 mbb with Dr Kinza Pruett #1   [facility] - 15 Sep 2335 8:93 AM     TASK EDITED                 Patient scheduled for b/l c4-6 mbb # 1 w/ Dr Codey Hess -     All preprocedure instructions reviewed with patient     NPO 1 hour prior   No antibx   Needs      Patient aware and agreed        Active Problems    1  Allergic rhinitis (477 9) (J30 9)   2  Bacterial ear infection, left (382 9,041 9) (H66 92,B96 89)   3  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)   4  Cervical radiculopathy (723 4) (M54 12)   5  Cervical stenosis of spinal canal (723 0) (M48 02)   6  Chest pain (786 50) (R07 9)   7  Chronic cervical pain (723 1,338 29) (M54 2,G89 29)   8  Chronic GERD (530 81) (K21 9)   9  Chronic neck pain (723 1,338 29) (M54 2,G89 29)   10  Cirrhosis of liver (571 5) (K74 60)   11  Colon cancer screening (V76 51) (Z12 11)   12  Degenerative cervical disc (722 4) (M50 30)   13  Dyslipidemia (272 4) (E78 5)   14  Dyspepsia (536 8) (K30)   15  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)   16  Hepatitis C (070 70) (B19 20)   17  Need for TD vaccine (V06 5) (Z23)   18  Need for vaccination for pneumococcus (V03 82) (Z23)   19  Palpitations (785 1) (R00 2)   20  Panic disorder (300 01) (F41 0)   21  Paresthesia of upper extremity (782 0) (R20 2)   22  Screening for colon cancer (V76 51) (Z12 11)   23   Shortness of breath (786 05) (R06 02)   24  Spinal deformity (756 10) (Q76 49)    Current Meds   1  Atorvastatin Calcium 20 MG Oral Tablet; TAKE 1 TABLET AT BEDTIME; Therapy: 00YJD4871 to (Evaluate:50Xgl7283)  Requested for: 50Fxp1690; Last   Rx:86Vgk6071 Ordered   2  Fluticasone Propionate 50 MCG/ACT Nasal Suspension; USE 2 SPRAYS IN EACH   NOSTRIL ONCE DAILY; Therapy: 90YYI4679 to (Last Rx:16Sbr6002)  Requested for: 39Bew1813 Ordered   3  Fluticasone Propionate 50 MCG/ACT Nasal Suspension; USE 2 SPRAYS IN EACH   NOSTRIL ONCE DAILY; Therapy: 75JHF8607 to (Last Rx:83Ffy9782)  Requested for: 88Fel2612 Ordered   4  Loratadine 10 MG Oral Tablet; TAKE 1 TABLET BY MOUTH EVERY DAY; Therapy: 00BLS4785 to (Evaluate:76Ade3372)  Requested for: 41ETB2005; Last   Rx:38Rfg5469 Ordered   5  Omeprazole 40 MG Oral Capsule Delayed Release; TAKE 1 CAPSULE DAILY; Therapy: 16LYF0766 to (Evaluate:23Qui3018)  Requested for: 76ELA7572; Last   Rx:68Wmk9422 Ordered   6  Ondansetron HCl - 4 MG Oral Tablet (Zofran); every 4-6 hours prn nausea/vomitting; Therapy: 91KBU4786 to (Last Rx:94Ghb7759)  Requested for: 34Lbg9269 Ordered   7  Tylenol 325 MG Oral Tablet; Therapy: (Recorded:91Ztt9570) to Recorded   8  Ventolin  (90 Base) MCG/ACT Inhalation Aerosol Solution; INHALE 1 TO 2   PUFFS EVERY 4 TO 6 HOURS AS NEEDED; Therapy: 96PKO7044 to (Last Rx:79Nll2099)  Requested for: 07Mqu9216 Ordered    Allergies    1   Morphine Sulfate POWD    Signatures   Electronically signed by : Vincenzo Desai, ; Sep 15 2016  9:01AM EST                       (Author)

## 2018-01-10 NOTE — RESULT NOTES
Message    Beti Tobin to review the labs  Called patient, reached , left message to call back  SVR achieved  Patient aware  Verified Results  (1) HEP C RNA PCR, QUANTITATIVE 20Oct2016 12:09PM Halie Paz Order Number: [id number]  TW Order Number: [id number]     Test Name Result Flag Reference   HCV PCR QUANTITATIVE      HCV Not Detected IU/mL   TEST INFORMATION Comment     The quantitative range of this assay is 15 IU/mL to 100 million IU/mL      Performed at:  [facility] [place] [address]  [id number]  : Cornelia Padilla MD, Phone:  [phone]       Signatures   Electronically signed by : Peyman Denny, ; Dec 15 2016 11:04AM EST                       (Author)

## 2018-01-11 NOTE — MISCELLANEOUS
Message   Recorded as Task   Date: 10/25/2016 04:18 PM, Created By: Nandini Prieto   Task Name: Follow Up   Assigned To: [facility]   Regarding Patient: Rosangela Fowler, Status: In Progress   Comment:    Marcie Hanson - 25 Oct 2016 4:18 PM     TASK CREATED  Patient appeared to have pain relief wtih the mbb, we can schedule for repeat injection  this can be bilateral   Kelsea English - 26 Oct 2016 8:01 AM     TASK EDITED   Nata Lew - 10 Nov 2016 4:11 PM     TASK EDITED                 attempted to contact pt, LMOM for pt to CB to schedule  B/L C4-6 MBB #2 in [facility] office  **********   Maricruz Morin - 11 Nov 2016 10:42 AM     TASK REPLIED TO: Previously Assigned To [facility] sched,Team    Patient came in bc he got a call  Plaese call him  Thank you  Aquiles Bond - 11 Nov 2016 2:48 PM     TASK REPLIED TO: Previously Assigned To [facility] sched,Team  Spoke to pt- scheduled for procedure on Wed 11/16/16 with Dr Alfie Neil  Went over preprocedure instructions:  NPO 1hr prior, needs , if pt developts infection or is put on abx- call office to rs, wear loose, comf clothing  Pt verbalized understanding  Active Problems    1  Allergic rhinitis (477 9) (J30 9)   2  Bacterial ear infection, left (382 9,041 9) (H66 92,B96 89)   3  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)   4  Cervical radiculopathy (723 4) (M54 12)   5  Cervical spondylosis (721 0) (M47 812)   6  Cervical stenosis of spinal canal (723 0) (M48 02)   7  Chest pain (786 50) (R07 9)   8  Chronic cervical pain (723 1,338 29) (M54 2,G89 29)   9  Chronic GERD (530 81) (K21 9)   10  Chronic neck pain (723 1,338 29) (M54 2,G89 29)   11  Cirrhosis of liver (571 5) (K74 60)   12  Colon cancer screening (V76 51) (Z12 11)   13  Degenerative cervical disc (722 4) (M50 30)   14  Dyslipidemia (272 4) (E78 5)   15  Dyspepsia (536 8) (K30)   16  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)   17  Hepatitis C (070 70) (B19 20)   18  Need for TD vaccine (V06 5) (Z23)   19  Need for vaccination for pneumococcus (V03 82) (Z23)   20  Palpitations (785 1) (R00 2)   21  Panic disorder (300 01) (F41 0)   22  Paresthesia of upper extremity (782 0) (R20 2)   23  Screening for colon cancer (V76 51) (Z12 11)   24  Shortness of breath (786 05) (R06 02)   25  Spinal deformity (756 10) (Q76 49)    Current Meds   1  Atorvastatin Calcium 20 MG Oral Tablet; TAKE 1 TABLET AT BEDTIME; Therapy: 05SKH6060 to (Evaluate:80Wop3033)  Requested for: 44Acg0709; Last   Rx:95Nzn7437 Ordered   2  Fluticasone Propionate 50 MCG/ACT Nasal Suspension; USE 2 SPRAYS IN EACH   NOSTRIL ONCE DAILY; Therapy: 02YJY6860 to (Last Rx:02Feb2016)  Requested for: 95Gfs3214 Ordered   3  Loratadine 10 MG Oral Tablet; TAKE 1 TABLET BY MOUTH EVERY DAY; Therapy: 57ACT9238 to (Evaluate:30Sxs6641)  Requested for: 47EAQ2635; Last   Rx:34Gsm3658 Ordered   4  Nicorette 4 MG Mouth/Throat Gum; CHEW 4 MG Every 4 hours PRN breakthrough   cravings; Therapy: 66VDZ3926 to (Last Rx:07Nov2016)  Requested for: 73OTN2258 Ordered   5  Nicotine 14 MG/24HR Transdermal Patch 24 Hour; PLACE 1 PATCH Daily; Therapy: 65FOB7664 to (Denae Castillo)  Requested for: 12RKR2047; Last   Rx:07Nov2016 Ordered   6  Nicotine 7 MG/24HR Transdermal Patch 24 Hour; once patch daily x 14 days after 14 mg   patch done; Therapy: 66MTX1922 to (Last Rx:07Nov2016)  Requested for: 98YUV9059 Ordered   7  Omeprazole 40 MG Oral Capsule Delayed Release; TAKE 1 CAPSULE DAILY; Therapy: 82UUA5213 to (Evaluate:44Ygw7107)  Requested for: 95KTQ0131; Last   Rx:35Tyo2857 Ordered   8  Ondansetron HCl - 4 MG Oral Tablet (Zofran); every 4-6 hours prn nausea/vomitting; Therapy: 60IZB6444 to (Last Rx:02Feb2016)  Requested for: 02Feb2016 Ordered   9  Tylenol 325 MG Oral Tablet; Therapy: (Recorded:91Afj2023) to Recorded   10   Ventolin  (90 Base) MCG/ACT Inhalation Aerosol Solution; INHALE 1 TO 2    PUFFS EVERY 4 TO 6 HOURS AS NEEDED; Therapy: 34UTR4755 to (Last Rx:12Upz2699)  Requested for: 04Tue1964 Ordered    Allergies    1   Morphine Sulfate POWD    Signatures   Electronically signed by : Maira Hawthorne, ; Nov 11 2016  2:49PM EST                       (Author)

## 2018-01-11 NOTE — RESULT NOTES
Verified Results  (1) LIPID PANEL FASTING W DIRECT LDL REFLEX [id number] 09:45AM Caleb Areas    Order Number: [id number]     Test Name Result Flag Reference   CHOLESTEROL 180 mg/dL     LDL CHOLESTEROL CALCULATED 116 mg/dL H 0-100   - Patient Instructions: This is a fasting blood test  Water, black tea or black coffee only after 9:00pm the night before test   Drink 2 glasses of water the morning of test     - Patient Instructions: This is a fasting blood test  Water, black tea or black coffee only after 9:00pm the night before test Drink 2 glasses of water the morning of test   Triglyceride:         Normal              <150 mg/dl       Borderline High    150-199 mg/dl       High               200-499 mg/dl       Very High          >499 mg/dl  Cholesterol:         Desirable        <200 mg/dl      Borderline High  200-239 mg/dl      High             >239 mg/dl  HDL Cholesterol:        High    >59 mg/dL      Low     <41 mg/dL  LDL Cholesterol:        Optimal          <100 mg/dl        Near Optimal     100-129 mg/dl        Above Optimal          Borderline High   130-159 mg/dl          High              160-189 mg/dl          Very High        >189 mg/dl  LDL CALCULATED:    This screening LDL is a calculated result  It does not have the accuracy of the Direct Measured LDL in the monitoring of patients with hyperlipidemia and/or statin therapy  Direct Measure LDL (NAT469) must be ordered separately in these patients  TRIGLYCERIDES 93 mg/dL  <=150   Specimen collection should occur prior to N-Acetylcysteine or Metamizole administration due to the potential for falsely depressed results  HDL,DIRECT 45 mg/dL  40-60   Specimen collection should occur prior to Metamizole administration due to the potential for falsely depressed results

## 2018-01-11 NOTE — RESULT NOTES
Message    Vena Boardman to review labs and call patient   Hep C PCR is not detected  Vena Enrico to review and call patient    Patient aware of results  1       1 Amended By: Isidoro Veliz; May 16 2016 4:17 PM EST    Verified Results  (1) COMPREHENSIVE METABOLIC PANEL [id number] 35:78XW Sienna Lynn     Test Name Result Flag Reference   GLUCOSE,RANDM 213 mg/dL H    If the patient is fasting, the ADA then defines impaired fasting glucose as > 100 mg/dL and diabetes as > or equal to 123 mg/dL  SODIUM 142 mmol/L  136-145   POTASSIUM 3 4 mmol/L L 3 5-5 3   CHLORIDE 110 mmol/L H 100-108   CARBON DIOXIDE 24 mmol/L  21-32   ANION GAP (CALC) 8 mmol/L  4-13   BLOOD UREA NITROGEN 20 mg/dL  5-25   CREATININE 1 04 mg/dL  0 60-1 30   Standardized to IDMS reference method   CALCIUM 8 4 mg/dL  8 3-10 1   BILI, TOTAL 0 69 mg/dL  0 20-1 00   ALK PHOSPHATAS 72 U/L     ALT (SGPT) 165 U/L H 12-78   AST(SGOT) 45 U/L  5-45   ALBUMIN 3 5 g/dL  3 5-5 0   TOTAL PROTEIN 6 8 g/dL  6 4-8 2   eGFR Non-African American      >60 0 ml/min/1 73sq m   [facility] Disease Education Program recommendations are as follows:  GFR calculation is accurate only with a steady state creatinine  Chronic Kidney disease less than 60 ml/min/1 73 sq  meters  Kidney failure less than 15 ml/min/1 73 sq  meters  (1) CBC/PLT/DIFF [id number] 11:26AM Sienna Lynn     Test Name Result Flag Reference   WBC COUNT 4 25 Thousand/uL L 4 31-10 16   RBC COUNT 4 70 Million/uL  3 88-5 62   HEMOGLOBIN 14 8 g/dL  12 0-17 0   HEMATOCRIT 45 0 %  36 5-49 3   MCV 96 fL  82-98   MCH 31 5 pg  26 8-34 3   MCHC 32 9 g/dL  31 4-37 4   RDW 13 9 %  11 6-15 1   MPV 11 6 fL  8 9-12 7   PLATELET COUNT   773-356   Not reported due to platelet clumping  Thousands/uL   Not reported due to platelet clumping     nRBC AUTOMATED 0 /100 WBCs     NEUTROPHILS RELATIVE PERCENT 58 %  43-75   LYMPHOCYTES RELATIVE PERCENT 31 %  14-44   MONOCYTES RELATIVE PERCENT 8 %  4-12   EOSINOPHILS RELATIVE PERCENT 2 %  0-6   BASOPHILS RELATIVE PERCENT 1 %  0-1   NEUTROPHILS ABSOLUTE COUNT 2 48 Thousands/?L  1 85-7 62   LYMPHOCYTES ABSOLUTE COUNT 1 33 Thousands/?L  0 60-4 47   MONOCYTES ABSOLUTE COUNT 0 33 Thousand/?L  0 17-1 22   EOSINOPHILS ABSOLUTE COUNT 0 07 Thousand/?L  0 00-0 61   BASOPHILS ABSOLUTE COUNT 0 02 Thousands/?L  0 00-0 10     (1) HEP C RNA PCR, QUANTITATIVE [id number] 11:26AM Nevin Montague     Test Name Result Flag Reference   HCV PCR QUANTITATIVE Comment IU/mL     HCV Not Detected   TEST INFORMATION Comment     The quantitative range of this assay is 15 IU/mL to 100 million IU/mL    Performed at:  [facility] [place] [id number] [employer] [facility]  [id number]  : Rashid Perez MD, Phone:  [phone]       Signatures   Electronically signed by : JODIE Patel ; May  5 2016 10:17PM EST                       (Author)    Electronically signed by : Kaila Hudson, ; May 16 2016  4:18PM EST                       (Author)

## 2018-01-12 VITALS
HEART RATE: 100 BPM | HEIGHT: 74 IN | DIASTOLIC BLOOD PRESSURE: 90 MMHG | BODY MASS INDEX: 28.46 KG/M2 | TEMPERATURE: 97.3 F | DIASTOLIC BLOOD PRESSURE: 72 MMHG | WEIGHT: 221.78 LBS | SYSTOLIC BLOOD PRESSURE: 110 MMHG | BODY MASS INDEX: 27.84 KG/M2 | TEMPERATURE: 97.3 F | SYSTOLIC BLOOD PRESSURE: 118 MMHG | HEART RATE: 84 BPM | HEIGHT: 74 IN | WEIGHT: 216.93 LBS

## 2018-01-12 VITALS
HEIGHT: 74 IN | BODY MASS INDEX: 27.9 KG/M2 | TEMPERATURE: 97.7 F | WEIGHT: 217.37 LBS | SYSTOLIC BLOOD PRESSURE: 124 MMHG | DIASTOLIC BLOOD PRESSURE: 78 MMHG | HEART RATE: 86 BPM

## 2018-01-12 NOTE — MISCELLANEOUS
Message  After receiving a request for refill of the patient's heartburn medication and ventolin, I spoke with the patient regarding his response to recent medication changes  In relation to his occasional feelings of what I believe to be panic attacks, the patient was not able to tolerate paxil well  When attempting to take it, he said that it simply made him "feel terrible" and stopped taking it  He continues to take ventolin with the panic attacks and appreciates symptomatic relief despite the fact that ventolin shold not provide relief if this truly is a panic attack  For the time being, I will discontinue the paxil and renew his ventolin, which can be followed up at his upcoming appointment with Dr Awa Aly on 3/11/2016  In terms of his request for omeprazole renewal, I discussed changing to a H2 blocker  He is willing to attempt this change  If he does not have the same symptomatic response to the H2 blocker as he does omeprazole, we will change back to PPI dosage   -PM      Plan  Peterson esophagus, GERD (gastroesophageal reflux disease)    · Ranitidine HCl - 150 MG Oral Tablet; TAKE 1 TABLET EVERY 12 HOURS DAILY  Shortness of breath    · Ventolin  (90 Base) MCG/ACT Inhalation Aerosol Solution; INHALE 1 TO  2 PUFFS EVERY 4 TO 6 HOURS AS NEEDED    Signatures   Electronically signed by : Leticia Chen DO; Feb 29 2016  2:44PM EST                       (Author)

## 2018-01-12 NOTE — MISCELLANEOUS
Message   Recorded as Task   Date: 07/21/2016 04:27 PM, Created By: Vernia Hatchet   Task Name: Follow Up   Assigned To: [facility],Team   Regarding Patient: Jama Blanc, Status: In Progress   Comment:    Marcie Hanson - 21 Jul 2016 4:27 PM     TASK CREATED  Please inform patient of the his cerivcal xray:    Chronic C3-C4 fusion and associated kyphosis  Multilevel degenerative changes  We discussed various injection but will await MRI   Harvey Jasmine - 22 Jul 2016 9:28 AM     TASK EDITED         Spoke with pt and informed him of above  Pt verbalizes understanding  Active Problems    1  Allergic rhinitis (477 9) (J30 9)   2  Bacterial ear infection, left (382 9,041 9) (H66 92,B96 89)   3  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)   4  Chest pain (786 50) (R07 9)   5  Chronic GERD (530 81) (K21 9)   6  Chronic neck pain (723 1,338 29) (M54 2,G89 29)   7  Cirrhosis of liver (571 5) (K74 60)   8  Colon cancer screening (V76 51) (Z12 11)   9  Degenerative cervical disc (722 4) (M50 30)   10  Dyslipidemia (272 4) (E78 5)   11  Dyspepsia (536 8) (K30)   12  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)   13  Hepatitis C (070 70) (B19 20)   14  Need for TD vaccine (V06 5) (Z23)   15  Need for vaccination for pneumococcus (V03 82) (Z23)   16  Palpitations (785 1) (R00 2)   17  Panic disorder (300 01) (F41 0)   18  Paresthesia of upper extremity (782 0) (R20 2)   19  Screening for colon cancer (V76 51) (Z12 11)   20  Shortness of breath (786 05) (R06 02)    Current Meds   1  Aspirin 81 MG TABS; TAKE 1 TABLET DAILY; Therapy: 30GYU0508 to (Evaluate:44Mgj9924)  Requested for: 56LLC5934; Last   Rx:20Nov2015 Ordered   2  Atorvastatin Calcium 20 MG Oral Tablet; TAKE 1 TABLET DAILY AT BEDTIME; Therapy: 81OOK1552 to (Evaluate:27Qzk4352)  Requested for: 29YRT2747; Last   Rx:18Nyb4461 Ordered   3  Fluticasone Propionate 50 MCG/ACT Nasal Suspension; USE 2 SPRAYS IN EACH   NOSTRIL ONCE DAILY;    Therapy: 81ABN5278 to (Last Rx:54Bse3226)  Requested for: 34Hih2227 Ordered   4  Gabapentin 300 MG Oral Capsule; TAKE 1 CAPSULE AT BEDTIME; Therapy: 66UOH7959 to (Evaluate:16Oct2016)  Requested for: 59JUJ5213; Last   Rx:86Ola3409 Ordered   5  Harvoni  MG Oral Tablet; TAKE 1 TABLET DAILY; Therapy: 70NSS8304 to (Evaluate:08Jun2016); Last Rx:14Zwn9595 Ordered   6  Loratadine 10 MG Oral Tablet; TAKE 1 TABLET BY MOUTH EVERY DAY; Therapy: 34PQL8795 to (Evaluate:08Jul2017)  Requested for: 71VIM7078; Last   Rx:82Dej3268 Ordered   7  Naproxen 500 MG Oral Tablet; TAKE 1 TABLET EVERY 12 HOURS WITH FOOD AS   NEEDED; Therapy: 03ZYU3491 to (Evaluate:01Jun2016)  Requested for: 54SPF8042; Last   Rx:99Ivo5824 Ordered   8  Neomycin-Polymyxin-HC 3 5-91734-9 Otic Solution (Cortisporin); 5 drops 5 times per   day; Therapy: 67OFE9196 to (Last Rx:99Kov1126)  Requested for: 65Rlv7603 Ordered   9  Omeprazole 20 MG Oral Capsule Delayed Release; TAKE 1 CAPSULE DAILY EVERY   MORNING BEFORE BREAKFAST; Therapy: 71ODH5627 to (Evaluate:39Hya1371)  Requested for: 83QQV3888; Last   Rx:11Mar2016 Ordered   10  Ondansetron HCl - 4 MG Oral Tablet (Zofran); every 4-6 hours prn nausea/vomitting; Therapy: 43TIL0499 to (Last Rx:62Wig7140)  Requested for: 84Vkv5741 Ordered   11  Ribavirin 200 MG Oral Tablet; 200 mg tablets  Take 3 tablets twice daily x 28 days; Therapy: 38MRM1382 to (Evaluate:23Jun2016); Last GJ:47WZE7978 Ordered   12  Tylenol 325 MG Oral Tablet; Therapy: (Recorded:88Eay3332) to Recorded   13  Ventolin  (90 Base) MCG/ACT Inhalation Aerosol Solution; INHALE 1 TO 2    PUFFS EVERY 4 TO 6 HOURS AS NEEDED; Therapy: 73HWQ8463 to (Last Rx:84Qau1100)  Requested for: 15Wlo9161 Ordered   14  Tammy Lord 12 5-75-50 &250 MG Oral Tablet Therapy Pack; take as directed; Therapy: 15ZLD9082 to (Evaluate:98Vlj8790); Last Rx:18Jan2016 Ordered    Allergies    1   Morphine Sulfate POWD    Signatures   Electronically signed by : Sam Thomas, ; Jul 22 2016  9:28AM EST                       (Author)

## 2018-01-12 NOTE — MISCELLANEOUS
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)   3  Chest pain (786 50) (R07 9)   4  Cirrhosis of liver (571 5) (K74 60)   5  Colon cancer screening (V76 51) (Z12 11)   6  Degenerative cervical disc (722 4) (M50 30)   7  Dyslipidemia (272 4) (E78 5)   8  Dyspepsia (536 8) (K30)   9  GERD (gastroesophageal reflux disease) (530 81) (K21 9)   10  Hepatitis C (070 70) (B19 20)   11  Need for TD vaccine (V06 5) (Z23)   12  Need for vaccination for pneumococcus (V03 82) (Z23)   13  Palpitations (785 1) (R00 2)   14  Shortness of breath (786 05) (R06 02)    Current Meds   1  Acetaminophen 500 MG Oral Tablet; TAKE 1 TABLET Every 6 hours PRN; Therapy: 51SXF1275 to (Evaluate:89Kwg2463)  Requested for: 71YAV0114; Last   Rx:29Jun2015 Ordered   2  Aspirin 81 MG Oral Tablet; TAKE 1 TABLET DAILY; Therapy: 47JBF0423 to (Evaluate:66Yjr6858)  Requested for: 80YBU9445; Last   Rx:20Nov2015 Ordered   3  Atorvastatin Calcium 20 MG Oral Tablet; TAKE 1 TABLET DAILY AT BEDTIME; Therapy: 11COU5827 to (Evaluate:40Evx6561)  Requested for: 09TCM7081; Last   Rx:29Jan2016 Ordered   4  Clotrimazole-Betamethasone 1-0 05 % External Cream;   Therapy: 45DJA6057 to Recorded   5  Fexofenadine HCl - 180 MG Oral Tablet; TAKE 1 TABLET DAILY AS NEEDED FOR   ALLERGIES; Therapy: 22YGH2512 to (Evaluate:92Dzb0431)  Requested for: 63WZK0647; Last   Rx:23Ake2777 Ordered   6  Fluticasone Propionate 50 MCG/ACT Nasal Suspension; USE 1 SPRAY IN EACH   NOSTRIL TWICE DAILY; Therapy: 92WMM2441 to (Evaluate:79Llw8285)  Requested for: 01UQS2390; Last   Rx:25Mar2015 Ordered   7  Harvoni  MG Oral Tablet; TAKE 1 TABLET DAILY;    Therapy: 22Jwd1811 to (Evaluate:08Jun2016); Last Rx:53Yge8966 Ordered   8  Loratadine 10 MG Oral Tablet; TAKE 1 TABLET BY MOUTH EVERY DAY; Therapy: 50RCA8974 to (Evaluate:20Mar2016)  Requested for: 45GIS2233; Last   Rx:29Jtw7853 Ordered   9  Naproxen 500 MG Oral Tablet; TAKE 1 TABLET EVERY 12 HOURS WITH FOOD AS   NEEDED; Therapy: 25XZN7784 to (Evaluate:23Dec2015)  Requested for: 88RGC8230; Last   Rx:03Dec2015 Ordered   10  Omeprazole 40 MG Oral Capsule Delayed Release; TAKE 1 CAPSULE DAILY; Therapy: 00VXT3558 to (Evaluate:29Jan2016)  Requested for: 52HXL0250; Last    Rx:01Oct2015 Ordered   11  RA Aspirin EC 81 MG Oral Tablet Delayed Release; Therapy: 66WXP6338 to Recorded   12  Ribavirin 200 MG Oral Tablet; 200 mg tablets  Take 3 tablets twice daily x 28 days; Therapy: 56PFT6022 to (Evaluate:23Jun2016); Last JN:07VDY9141 Ordered   13  Izzy Servant 12 5-75-50 &250 MG Oral Tablet Therapy Pack; take as directed; Therapy: 59GQI5565 to (Evaluate:29Cwv9409); Last Rx:18Jan2016 Ordered    Allergies    1   Morphine Sulfate POWD    Signatures   Electronically signed by : Kaila Hudson, ; Feb 1 2016  9:03AM EST                       (Author)

## 2018-01-12 NOTE — MISCELLANEOUS
Message   Recorded as Task   Date: 09/26/2016 11:45 AM, Created By: Adithya Sanchez   Task Name: Call Back   Assigned To: [address] ,Team   Regarding Patient: Dong Rodriguez, Status: Active   Comment:    Adithya Sanchez - 26 Sep 2016 11:45 AM     TASK CREATED  Voicemail left by patient requesting to r/s appt for procedure scheduled 10/12/16  Kailee Reynolds - 03 Oct 5026 9:66 PM     TASK IN PROGRESS   Kailee Reynolds - 03 Oct 2271 9:32 PM     TASK EDITED                 Lm on Vm to Cb   Kailee Reynolds - 04 Oct 6682 7:55 PM     TASK EDITED                 Lm on Vm to HOSP [facility] - 06 Oct 2382 7:94 AM     TASK EDITED   Letter Sent      Active Problems    1  Allergic rhinitis (477 9) (J30 9)   2  Bacterial ear infection, left (382 9,041 9) (H66 92,B96 89)   3  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)   4  Cervical radiculopathy (723 4) (M54 12)   5  Cervical stenosis of spinal canal (723 0) (M48 02)   6  Chest pain (786 50) (R07 9)   7  Chronic cervical pain (723 1,338 29) (M54 2,G89 29)   8  Chronic GERD (530 81) (K21 9)   9  Chronic neck pain (723 1,338 29) (M54 2,G89 29)   10  Cirrhosis of liver (571 5) (K74 60)   11  Colon cancer screening (V76 51) (Z12 11)   12  Degenerative cervical disc (722 4) (M50 30)   13  Dyslipidemia (272 4) (E78 5)   14  Dyspepsia (536 8) (K30)   15  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)   16  Hepatitis C (070 70) (B19 20)   17  Need for TD vaccine (V06 5) (Z23)   18  Need for vaccination for pneumococcus (V03 82) (Z23)   19  Palpitations (785 1) (R00 2)   20  Panic disorder (300 01) (F41 0)   21  Paresthesia of upper extremity (782 0) (R20 2)   22  Screening for colon cancer (V76 51) (Z12 11)   23  Shortness of breath (786 05) (R06 02)   24  Spinal deformity (756 10) (Q76 49)    Current Meds   1  Atorvastatin Calcium 20 MG Oral Tablet; TAKE 1 TABLET AT BEDTIME; Therapy: 01STD7572 to (Evaluate:30Rlk3994)  Requested for: 08Ety5685; Last   Rx:22Hbx7408 Ordered   2  Fluticasone Propionate 50 MCG/ACT Nasal Suspension; USE 2 SPRAYS IN EACH   NOSTRIL ONCE DAILY; Therapy: 76XBR9953 to (Last Rx:82Hqr3192)  Requested for: 42Pcg7932 Ordered   3  Fluticasone Propionate 50 MCG/ACT Nasal Suspension; USE 2 SPRAYS IN EACH   NOSTRIL ONCE DAILY; Therapy: 70BGI0577 to (Last Rx:87Ewv0310)  Requested for: 99Yuh5364 Ordered   4  Loratadine 10 MG Oral Tablet; TAKE 1 TABLET BY MOUTH EVERY DAY; Therapy: 39SZM9524 to (Evaluate:06Aau9643)  Requested for: 43XGK5046; Last   Rx:49Msp7050 Ordered   5  Omeprazole 40 MG Oral Capsule Delayed Release; TAKE 1 CAPSULE DAILY; Therapy: 51FGI1736 to (Evaluate:87Acc7768)  Requested for: 52ELX6270; Last   Rx:09Vsp7236 Ordered   6  Ondansetron HCl - 4 MG Oral Tablet (Zofran); every 4-6 hours prn nausea/vomitting; Therapy: 30YSV2628 to (Last Rx:08Rtc9504)  Requested for: 40Gxo7857 Ordered   7  Tylenol 325 MG Oral Tablet; Therapy: (Recorded:29Vkw5803) to Recorded   8  Ventolin  (90 Base) MCG/ACT Inhalation Aerosol Solution; INHALE 1 TO 2   PUFFS EVERY 4 TO 6 HOURS AS NEEDED; Therapy: 14THK5962 to (Last Rx:65Fbo5852)  Requested for: 54Bab2868 Ordered    Allergies    1   Morphine Sulfate POWD    Signatures   Electronically signed by : Solange Kothari, ; Oct  6 2016 11:54AM EST                       (Author)
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
## 2018-01-13 VITALS
TEMPERATURE: 97.2 F | BODY MASS INDEX: 28.41 KG/M2 | HEIGHT: 74 IN | SYSTOLIC BLOOD PRESSURE: 122 MMHG | WEIGHT: 221.34 LBS | HEART RATE: 92 BPM | DIASTOLIC BLOOD PRESSURE: 84 MMHG
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
## 2018-01-13 NOTE — RESULT NOTES
Message    [facility] to review the labs and call patient      Patient aware of results  1       1 Amended By: Kenna Peralta; Mar 21 2016 11:57 AM EST    Verified Results  (1) CBC/PLT/DIFF 50IIN6410 10:44AM Claudio Walls Order Number: [id number]     No Clots     Test Name Result Flag Reference   WBC COUNT 4 78 Thousand/uL  4 31-10 16   RBC COUNT 4 97 Million/uL  3 88-5 62   HEMOGLOBIN 16 1 g/dL  12 0-17 0   HEMATOCRIT 47 5 %  36 5-49 3   MCV 96 fL  82-98   MCH 32 4 pg  26 8-34 3   MCHC 33 9 g/dL  31 4-37 4   RDW 14 0 %  11 6-15 1   MPV 11 6 fL  8 9-12 7   PLATELET COUNT   707-917   Not reported due to platelet clumping  Thousands/uL   Not reported due to platelet clumping  nRBC AUTOMATED 0 /100 WBCs     NEUTROPHILS RELATIVE PERCENT 54 %  43-75   LYMPHOCYTES RELATIVE PERCENT 34 %  14-44   MONOCYTES RELATIVE PERCENT 9 %  4-12   EOSINOPHILS RELATIVE PERCENT 2 %  0-6   BASOPHILS RELATIVE PERCENT 1 %  0-1   NEUTROPHILS ABSOLUTE COUNT 2 55 Thousands/µL  1 85-7 62   LYMPHOCYTES ABSOLUTE COUNT 1 62 Thousands/µL  0 60-4 47   MONOCYTES ABSOLUTE COUNT 0 45 Thousand/µL  0 17-1 22   EOSINOPHILS ABSOLUTE COUNT 0 08 Thousand/µL  0 00-0 61   BASOPHILS ABSOLUTE COUNT 0 05 Thousands/µL  0 00-0 10     (1) COMPREHENSIVE METABOLIC PANEL 88CTB4632 08:14HD Claudio Walls Order Number: [id number]      National Kidney Disease Education Program recommendations are as follows:  GFR calculation is accurate only with a steady state creatinine  Chronic Kidney disease less than 60 ml/min/1 73 sq  meters  Kidney failure less than 15 ml/min/1 73 sq  meters  Test Name Result Flag Reference   GLUCOSE,RANDM 84 mg/dL     If the patient is fasting, the ADA then defines impaired fasting glucose as > 100 mg/dL and diabetes as > or equal to 123 mg/dL     SODIUM 142 mmol/L  136-145   POTASSIUM 3 9 mmol/L  3 5-5 3   CHLORIDE 111 mmol/L H 100-108   CARBON DIOXIDE 24 mmol/L  21-32   ANION GAP (CALC) 7 mmol/L  4-13   BLOOD UREA NITROGEN 13 mg/dL  5-25   CREATININE 0 98 mg/dL  0 60-1 30   Standardized to IDMS reference method   CALCIUM 8 9 mg/dL  8 3-10 1   BILI, TOTAL 0 59 mg/dL  0 20-1 00   ALK PHOSPHATAS 82 U/L     ALT (SGPT) 120 U/L H 12-78   AST(SGOT) 44 U/L  5-45   ALBUMIN 3 7 g/dL  3 5-5 0   TOTAL PROTEIN 7 7 g/dL  6 4-8 2   eGFR Non-African American      >60 0 ml/min/1 73sq m       Signatures   Electronically signed by : Salvador Shah, ; Mar 21 2016 12:05PM EST                       (Author)

## 2018-01-13 NOTE — RESULT NOTES
Message    Hep C PCR negative  1    Mandy to review and call patient      Patient aware of results  Is aware of monthly f/u bloodwork  He has script for April  He reports feeling well  1       1 Amended By: Josh Ordonez; Mar 21 2016 11:56 AM EST    Verified Results  (1) HEP C RNA PCR, QUANTITATIVE [id number] 10:44AM Yessi Vale Order Number: [id number]    Performed at:  [address] [place] [address]  [id number]  : Bruce Carroll MD, Phone:  [phone]     Test Name Result Flag Reference   HCV PCR QUANTITATIVE Comment IU/mL     HCV Not Detected   TEST INFORMATION Comment     The quantitative range of the assay is 15 IU/mL to 100 million IU/mLusing RASHIDA(R) TaqMan(R) HCV test, v 2 0  The limit of detection (LOD)and lower limit of quantification (LLOQ) for this assay is 15 IU/mL  Results less than the quantitative range of the assay will be reportedas HCV RNA detected, less than 15 IU/mL

## 2018-01-13 NOTE — MISCELLANEOUS
Message   Recorded as Task   Date: 08/04/2016 04:17 PM, Created By: System   Task Name: Schedule Appointment   Assigned To: SPINE AND PAIN,Team   Regarding Patient: Marzette Brittle, Status: In Progress   Comment:    System - 04 Aug 2016 4:17 PM     Preferred Communication: Mail  Ordering Site: [facility]    Referred To: Baljeet Valles MD, [facility]  To Be Done: 04 Aug 2016   Anu Peng - 16 Aug 2016 11:24 AM     Omar Desai - 16 Aug 2016 11:24 AM     TASK REASSIGNED: Previously Assigned To Esthela Melissa - 16 Aug 2016 11:24 AM     TASK EDITED  PT CALLED AND STATES DR Edgar Vargas REFERRING HIM TO DR Chong  PT HAD MRI [facility] 8/2/16   Anu Peng - 16 Aug 2016 11:31 AM     TASK EDITED    PT GAVE VERBAL PERMISSION TO SPEAK WITH HIS GF RONY FOR APPT  COMPLETED INTAKE AND SENT TO REVIEW  Anu Peng - 17 Aug 2016 9:01 AM     TASK EDITED  CALLED AND SCHEDULED PT WITH DR Tong Maya 8/22/16 @ 10AM IN [facility]  MAILED NPP  MRI WEB  PT DOES NOT NEED INS REFERRAL SINCE PCP IS  CLINIC, CALLED PCP OFFICE TO VERIFY  Active Problems    1  Allergic rhinitis (477 9) (J30 9)   2  Bacterial ear infection, left (382 9,041 9) (H66 92,B96 89)   3  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)   4  Cervical radiculopathy (723 4) (M54 12)   5  Cervical stenosis of spinal canal (723 0) (M48 02)   6  Chest pain (786 50) (R07 9)   7  Chronic GERD (530 81) (K21 9)   8  Chronic neck pain (723 1,338 29) (M54 2,G89 29)   9  Cirrhosis of liver (571 5) (K74 60)   10  Colon cancer screening (V76 51) (Z12 11)   11  Degenerative cervical disc (722 4) (M50 30)   12  Dyslipidemia (272 4) (E78 5)   13  Dyspepsia (536 8) (K30)   14  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)   15  Hepatitis C (070 70) (B19 20)   16  Need for TD vaccine (V06 5) (Z23)   17  Need for vaccination for pneumococcus (V03 82) (Z23)   18  Palpitations (785 1) (R00 2)   19   Panic disorder (300 01) (F41 0)   20  Paresthesia of upper extremity (782 0) (R20 2)   21  Screening for colon cancer (V76 51) (Z12 11)   22  Shortness of breath (786 05) (R06 02)    Current Meds   1  Aspirin 81 MG TABS; TAKE 1 TABLET DAILY; Therapy: 63YCB6753 to (Evaluate:26Une7609)  Requested for: 91CMK9335; Last   Rx:20Nov2015 Ordered   2  Atorvastatin Calcium 20 MG Oral Tablet; TAKE 1 TABLET DAILY AT BEDTIME; Therapy: 25DWD2038 to (Evaluate:94Vhe3831)  Requested for: 35QIQ0282; Last   Rx:36Lwg3609 Ordered   3  Fluticasone Propionate 50 MCG/ACT Nasal Suspension; USE 2 SPRAYS IN EACH   NOSTRIL ONCE DAILY; Therapy: 29DAD5222 to (Last Rx:02Feb2016)  Requested for: 01Rlh1640 Ordered   4  Gabapentin 300 MG Oral Capsule; TAKE 1 CAPSULE AT BEDTIME; Therapy: 00DRN1572 to (Evaluate:16Oct2016)  Requested for: 10CRQ2786; Last   Rx:72Bnf7196 Ordered   5  Harvoni  MG Oral Tablet; TAKE 1 TABLET DAILY; Therapy: 34TEN5582 to (Evaluate:08Jun2016); Last Rx:38Qaj1505 Ordered   6  Loratadine 10 MG Oral Tablet; TAKE 1 TABLET BY MOUTH EVERY DAY; Therapy: 20AGR0520 to (Evaluate:40Xww3937)  Requested for: 97MVG6494; Last   Rx:38Sbd0892 Ordered   7  Naproxen 500 MG Oral Tablet; TAKE 1 TABLET EVERY 12 HOURS WITH FOOD AS   NEEDED; Therapy: 37HIV1658 to (Evaluate:01Jun2016)  Requested for: 65LJP9197; Last   Rx:07Ivz4010 Ordered   8  Neomycin-Polymyxin-HC 3 5-54598-1 Otic Solution (Cortisporin); 5 drops 5 times per   day; Therapy: 83DXR1150 to (Last Rx:17Bxl1574)  Requested for: 87Zsp6856 Ordered   9  Omeprazole 20 MG Oral Capsule Delayed Release; TAKE 1 CAPSULE DAILY EVERY   MORNING BEFORE BREAKFAST; Therapy: 86AUV8775 to (Evaluate:39Vjj5010)  Requested for: 14JKS3163; Last   Rx:11Mar2016 Ordered   10  Ondansetron HCl - 4 MG Oral Tablet (Zofran); every 4-6 hours prn nausea/vomitting; Therapy: 81AUE4035 to (Last Rx:02Feb2016)  Requested for: 02Feb2016 Ordered   11  Ribavirin 200 MG Oral Tablet; 200 mg tablets    Take 3 tablets twice daily x 28 days; Therapy: 92PNH6705 to (Evaluate:23Jun2016); Last VF:51PLY4469 Ordered   12  Tylenol 325 MG Oral Tablet; Therapy: (Recorded:79Ygg0845) to Recorded   13  Ventolin  (90 Base) MCG/ACT Inhalation Aerosol Solution; INHALE 1 TO 2    PUFFS EVERY 4 TO 6 HOURS AS NEEDED; Therapy: 54ZCT4327 to (Last Rx:28Eue5160)  Requested for: 96Hsh2551 Ordered   14  Brenda Alves 12 5-75-50 &250 MG Oral Tablet Therapy Pack; take as directed; Therapy: 30FMW6783 to (Evaluate:47Cjp9924); Last Rx:18Jan2016 Ordered    Allergies    1  Morphine Sulfate POWD    Signatures   Electronically signed by :  Nelia Del Real, ; Aug 18 2016  9:13AM EST                       (Author)

## 2018-01-14 VITALS
WEIGHT: 221.78 LBS | TEMPERATURE: 97.5 F | BODY MASS INDEX: 28.46 KG/M2 | HEIGHT: 74 IN | SYSTOLIC BLOOD PRESSURE: 110 MMHG | HEART RATE: 80 BPM | DIASTOLIC BLOOD PRESSURE: 74 MMHG

## 2018-01-14 VITALS
TEMPERATURE: 97.6 F | WEIGHT: 222.66 LBS | HEIGHT: 74 IN | DIASTOLIC BLOOD PRESSURE: 90 MMHG | HEART RATE: 84 BPM | SYSTOLIC BLOOD PRESSURE: 130 MMHG | BODY MASS INDEX: 28.58 KG/M2

## 2018-01-15 NOTE — PROGRESS NOTES
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)   3  Bilateral chronic serous otitis media (381 10) (H65 23)   4  Cervical radiculopathy (723 4) (M54 12)   5  Cervical spondylosis (721 0) (M47 812)   6  Cervical stenosis of spinal canal (723 0) (M48 02)   7  Chest pain (786 50) (R07 9)   8  Chronic cervical pain (723 1,338 29) (M54 2,G89 29)   9  Chronic GERD (530 81) (K21 9)   10  Chronic neck pain (723 1,338 29) (M54 2,G89 29)   11  Cirrhosis of liver (571 5) (K74 60)   12  Colon cancer screening (V76 51) (Z12 11)   13  Degenerative cervical disc (722 4) (M50 30)   14  Dyslipidemia (272 4) (E78 5)   15  Dyspepsia (536 8) (K30)   16  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)   17  Hepatitis C (070 70) (B19 20)   18  Need for TD vaccine (V06 5) (Z23)   19  Need for vaccination for pneumococcus (V03 82) (Z23)   20  Palpitations (785 1) (R00 2)   21  Panic disorder (300 01) (F41 0)   22  Paresthesia of upper extremity (782 0) (R20 2)   23  Screening for colon cancer (V76 51) (Z12 11)   24  Shortness of breath (786 05) (R06 02)   25  Spinal deformity (756 10) (Q76 49)   26  Stress (V62 89) (F43 9)    Current Meds   1  Atorvastatin Calcium 20 MG Oral Tablet; TAKE 1 TABLET AT BEDTIME; Therapy: 58UBM0999 to (Evaluate:53Pxf5396)  Requested for: 85ONW1928; Last   Rx:29Mar2017 Ordered   2  Fluticasone Propionate 50 MCG/ACT Nasal Suspension; USE 2 SPRAYS IN EACH   NOSTRIL ONCE DAILY;    Therapy: 87ZZZ4801 to (Last Rx:21Jul2017)  Requested for: 21Jul2017 Ordered   3  Loratadine 10 MG Oral Tablet; TAKE 1 TABLET BY MOUTH EVERY DAY; Therapy: 24XZY9285 to (Evaluate:06Oiu7180)  Requested for: 17YSM0790; Last   YB:15JHY1023 Ordered   4  Omeprazole 40 MG Oral Capsule Delayed Release; TAKE 1 CAPSULE DAILY; Therapy: 46NHZ7490 to (Evaluate:14Opv1928)  Requested for: 45Cqy8956; Last   Rx:85Ljd6842 Ordered   5  Ondansetron HCl - 4 MG Oral Tablet; every 4-6 hours prn nausea/vomitting; Therapy: 54HOR8589 to (Last Rx:61Eid0696)  Requested for: 23Abu8026 Ordered   6  Otomax-HC 10-10-1 MG/ML Otic Solution; INSTILL 3 DROPS IN AFFECTED EAR(S) 3-4   TIMES DAILY; Therapy: 74Yzz1179 to (Last Payal Zepedaman)  Requested for: 41Oly2740 Ordered   7  Tylenol 325 MG Oral Tablet; take 1 to 2 tablets every 6 hours as needed; Last   Rx:00Sdx6782 Ordered   8  Ventolin  (90 Base) MCG/ACT Inhalation Aerosol Solution; INHALE 1 TO 2   PUFFS EVERY 4 TO 6 HOURS AS NEEDED; Therapy: 00RPB5205 to (Last Rx:09Jun2017)  Requested for: 29YWK1988 Ordered    Allergies    1   Morphine Sulfate POWD    Plan  Shortness of breath    · Albuterol Sulfate (2 5 MG/3ML) 0 083% Inhalation Nebulization Solution    Future Appointments    Date/Time Provider Specialty Site   11/30/2017 12:30 PM Karen Mott DO Gastroenterology [facility] OUTPATIENT   01/04/2018 12:45 PM Specialty Clinic, Gastroenterology  [facility]     Signatures   Electronically signed by : Jean Claude Jarrell, ; Sep 18 2017 10:01AM EST                       (Author)    Electronically signed by : JODIE Palacios ; Sep 18 2017  3:36PM EST                       (Review)    Electronically signed by : Andrew Mccormick DO; Sep 24 2017  2:11PM EST                       (Review)

## 2018-01-15 NOTE — MISCELLANEOUS
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)   13  Hepatitis C (070 70) (B19 20)   14  Need for TD vaccine (V06 5) (Z23)   15  Need for vaccination for pneumococcus (V03 82) (Z23)   16  Palpitations (785 1) (R00 2)   17  Panic disorder (300 01) (F41 0)   18  Screening for colon cancer (V76 51) (Z12 11)   19  Shortness of breath (786 05) (R06 02)    Current Meds   1  Aspirin 81 MG TABS; TAKE 1 TABLET DAILY; Therapy: 27VBI0402 to (Evaluate:34Uic3116)  Requested for: 07KIZ0556; Last   Rx:20Nov2015 Ordered   2  Atorvastatin Calcium 20 MG Oral Tablet; TAKE 1 TABLET DAILY AT BEDTIME; Therapy: 16MLG7505 to (Evaluate:88Ezf6095)  Requested for: 54NYY6126; Last   Rx:49Gxy8587 Ordered   3  Fluticasone Propionate 50 MCG/ACT Nasal Suspension; USE 2 SPRAYS IN EACH   NOSTRIL ONCE DAILY; Therapy: 39RBG0988 to (Last Rx:82Dcr8644)  Requested for: 91Pxr6640 Ordered   4  Harvoni  MG Oral Tablet; TAKE 1 TABLET DAILY; Therapy: 03ILE8779 to (Evaluate:08Jun2016); Last Rx:65Nhb4077 Ordered   5  Loratadine 10 MG Oral Tablet; TAKE 1 TABLET BY MOUTH EVERY DAY; Therapy: 46TJN1352 to (Evaluate:20Mar2016)  Requested for: 13KPQ0528; Last   Rx:97Dag4566 Ordered   6  Naproxen 500 MG Oral Tablet; TAKE 1 TABLET EVERY 12 HOURS WITH FOOD AS   NEEDED;    Therapy: 18AHH3014 to (Evaluate:01Jun2016)  Requested for: 03MXJ8344; Last   Rx:16Cnc2249 Ordered 7  Neomycin-Polymyxin-HC 3 5-11615-3 Otic Solution (Cortisporin); 5 drops 5 times per   day; Therapy: 39LWC4323 to (Last Rx:02Feb2016)  Requested for: 02Feb2016 Ordered   8  Omeprazole 20 MG Oral Capsule Delayed Release; TAKE 1 CAPSULE DAILY EVERY   MORNING BEFORE BREAKFAST; Therapy: 75CWB8978 to (Evaluate:82Qpx5602)  Requested for: 91ZDC3883; Last   Rx:11Mar2016 Ordered   9  Ondansetron HCl - 4 MG Oral Tablet (Zofran); every 4-6 hours prn nausea/vomitting; Therapy: 23GPJ5328 to (Last Rx:02Feb2016)  Requested for: 02Feb2016 Ordered   10  Ribavirin 200 MG Oral Tablet; 200 mg tablets  Take 3 tablets twice daily x 28 days; Therapy: 27YBU6568 to (Evaluate:23Jun2016); Last VF:18DWP2063 Ordered   11  Ventolin  (90 Base) MCG/ACT Inhalation Aerosol Solution; INHALE 1 TO 2    PUFFS EVERY 4 TO 6 HOURS AS NEEDED; Therapy: 99DKC7382 to (Last Rx:29Feb2016)  Requested for: 29Feb2016 Ordered   12  Rosy Early 12 5-75-50 &250 MG Oral Tablet Therapy Pack; take as directed; Therapy: 77RZI8430 to (Evaluate:54Ckc9232); Last Rx:18Jan2016 Ordered    Allergies    1   Morphine Sulfate POWD    Signatures   Electronically signed by : Salvador Shah, ; Jun 14 2016 12:29PM EST                       (Author)

## 2018-01-15 NOTE — RESULT NOTES
Message    Yunier Medina to review the labs and call patient   All the labs are normal     Patient aware of results  Verified Results  (1) CBC/PLT/DIFF 22Jnm3043 10:34AM Scarlet Days   TW Order Number: [id number]     Test Name Result Flag Reference   WBC COUNT 4 89 Thousand/uL  4 31-10 16   RBC COUNT 5 17 Million/uL  3 88-5 62   HEMOGLOBIN 16 1 g/dL  12 0-17 0   HEMATOCRIT 49 3 %  36 5-49 3   MCV 95 fL  82-98   MCH 31 1 pg  26 8-34 3   MCHC 32 7 g/dL  31 4-37 4   RDW 13 1 %  11 6-15 1   MPV 11 3 fL  8 9-12 7   PLATELET COUNT 171 Thousands/uL L 149-390   nRBC AUTOMATED 0 /100 WBCs     NEUTROPHILS RELATIVE PERCENT 58 %  43-75   LYMPHOCYTES RELATIVE PERCENT 32 %  14-44   MONOCYTES RELATIVE PERCENT 8 %  4-12   EOSINOPHILS RELATIVE PERCENT 1 %  0-6   BASOPHILS RELATIVE PERCENT 1 %  0-1   NEUTROPHILS ABSOLUTE COUNT 2 85 Thousands/?L  1 85-7 62   LYMPHOCYTES ABSOLUTE COUNT 1 56 Thousands/?L  0 60-4 47   MONOCYTES ABSOLUTE COUNT 0 37 Thousand/?L  0 17-1 22   EOSINOPHILS ABSOLUTE COUNT 0 07 Thousand/?L  0 00-0 61   BASOPHILS ABSOLUTE COUNT 0 03 Thousands/?L  0 00-0 10     (1) COMPREHENSIVE METABOLIC PANEL 06WYN4444 37:57QU Scarlet Days   TW Order Number: [id number]     Test Name Result Flag Reference   GLUCOSE,RANDM 104 mg/dL     If the patient is fasting, the ADA then defines impaired fasting glucose as > 100 mg/dL and diabetes as > or equal to 123 mg/dL     SODIUM 142 mmol/L  136-145   POTASSIUM 4 0 mmol/L  3 5-5 3   CHLORIDE 109 mmol/L H 100-108   CARBON DIOXIDE 26 mmol/L  21-32   ANION GAP (CALC) 7 mmol/L  4-13   BLOOD UREA NITROGEN 17 mg/dL  5-25   CREATININE 1 11 mg/dL  0 60-1 30   Standardized to IDMS reference method   CALCIUM 9 8 mg/dL  8 3-10 1   BILI, TOTAL 0 35 mg/dL  0 20-1 00   ALK PHOSPHATAS 71 U/L     ALT (SGPT) 41 U/L  12-78   AST(SGOT) 19 U/L  5-45   ALBUMIN 3 6 g/dL  3 5-5 0   TOTAL PROTEIN 7 6 g/dL  6 4-8 2   eGFR Non-African American      >60 0 ml/min/1 73sq marixa [facility] Disease Education Program recommendations are as follows:  GFR calculation is accurate only with a steady state creatinine  Chronic Kidney disease less than 60 ml/min/1 73 sq  meters  Kidney failure less than 15 ml/min/1 73 sq  meters         Signatures   Electronically signed by : Huma Khoury, ; Aug 16 2016  3:23PM EST                       (Author)

## 2018-01-16 NOTE — MISCELLANEOUS
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)  4  Cervical radiculopathy (723 4) (M54 12)  5  Cervical stenosis of spinal canal (723 0) (M48 02)  6  Chest pain (786 50) (R07 9)  7  Chronic cervical pain (723 1,338 29) (M54 2,G89 29)  8  Chronic GERD (530 81) (K21 9)  9  Chronic neck pain (723 1,338 29) (M54 2,G89 29)  10  Cirrhosis of liver (571 5) (K74 60)  11  Colon cancer screening (V76 51) (Z12 11)  12  Degenerative cervical disc (722 4) (M50 30)  13  Dyslipidemia (272 4) (E78 5)  14  Dyspepsia (536 8) (K30)  15  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)  16  Hepatitis C (070 70) (B19 20)  17  Need for TD vaccine (V06 5) (Z23)  18  Need for vaccination for pneumococcus (V03 82) (Z23)  19  Palpitations (785 1) (R00 2)  20  Panic disorder (300 01) (F41 0)  21  Paresthesia of upper extremity (782 0) (R20 2)  22  Screening for colon cancer (V76 51) (Z12 11)  23  Shortness of breath (786 05) (R06 02)  24  Spinal deformity (756 10) (Q76 49)    Current Meds  1  Atorvastatin Calcium 20 MG Oral Tablet; TAKE 1 TABLET AT BEDTIME; Therapy: 98HNO5511 to (Evaluate:35Zcy7152)  Requested for: 60Hoc7907; Last   Rx:76Nrx0326 Ordered  2  Fluticasone Propionate 50 MCG/ACT Nasal Suspension; USE 2 SPRAYS IN EACH   NOSTRIL ONCE DAILY; Therapy: 92RAM4098 to (Last Rx:60Yme0146)  Requested for: 46Vay0019 Ordered  3  Fluticasone Propionate 50 MCG/ACT Nasal Suspension; USE 2 SPRAYS IN EACH   NOSTRIL ONCE DAILY; Therapy: 35RVN5354 to (Last Rx:44Oqq3252)  Requested for: 19Aug2016 Ordered  4   Loratadine 10 MG Oral Tablet; TAKE 1 TABLET BY MOUTH EVERY DAY; Therapy: 88VCO4450 to (Evaluate:98Sfb9626)  Requested for: 81BTM8648; Last   Rx:24Qvr6380 Ordered  5  Omeprazole 40 MG Oral Capsule Delayed Release; TAKE 1 CAPSULE DAILY; Therapy: 13BVO3049 to (Evaluate:63Fif8402)  Requested for: 34JLR4595; Last   Rx:38Dfc0219 Ordered  6  Ondansetron HCl - 4 MG Oral Tablet (Zofran); every 4-6 hours prn nausea/vomitting; Therapy: 96QRR4578 to (Last Rx:80Cuz7143)  Requested for: 42Daa1219 Ordered  7  Tylenol 325 MG Oral Tablet; Therapy: (Recorded:33Cnw0784) to Recorded  8  Ventolin  (90 Base) MCG/ACT Inhalation Aerosol Solution; INHALE 1 TO 2   PUFFS EVERY 4 TO 6 HOURS AS NEEDED; Therapy: 23XSN2357 to (Last Rx:95Ecq5120)  Requested for: 82Bep7954 Ordered    Allergies   1   Morphine Sulfate POWD    Signatures   Electronically signed by : Neal Moss, ; Dec 12 2016 11:59AM EST                       (Author)

## 2018-01-16 NOTE — PROGRESS NOTES
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)    Plan  Allergic rhinitis    · Fluticasone Propionate 50 MCG/ACT Nasal Suspension; USE 2 SPRAYS IN EACH NOSTRIL  ONCE DAILY  Degenerative cervical disc    · Naproxen 500 MG Oral Tablet; TAKE 1 TABLET EVERY 12 HOURS WITH FOOD AS NEEDED  Dyslipidemia    · Atorvastatin Calcium 20 MG Oral Tablet; TAKE 1 TABLET DAILY AT BEDTIME  External ear disorder    · Neomycin-Polymyxin-HC 3 5-61224-8 Otic Solution (Cortisporin); 5 drops 5 times per day  GERD (gastroesophageal reflux disease)    · Omeprazole 40 MG Oral Capsule Delayed Release; TAKE 1 CAPSULE DAILY  Hepatitis C    · Ondansetron HCl - 4 MG Oral Tablet (Zofran); every 4-6 hours prn nausea/vomitting   · Follow-up visit in 6 weeks Evaluation and Treatment  Follow-up  Status: Hold For - Scheduling   Requested for: 55WWE7628  Panic disorder    · Escitalopram Oxalate 10 MG Oral Tablet (Lexapro); TAKE 1 TABLET DAILY  Screening for colon cancer    · COLONOSCOPY; Status:Active; Requested for:69Ccz1487;     Discussion/Summary  Discussion Summary:   1  Allergic rhinitis - pt previously with better control with fluticasone nasal spray along with loratadine  Will recommend stopping the fexofenadine on his med list currently and only be on the combination of loratadine and fluticasone  F/U symptomatically  2    HCV/cirrhosis with previous treatment, genotype 1a - currently following with GI and on therapy starting tomorrow after full preliminary w/u was completed  will prescribe zofran ODT prn N/V   3  Hx lambert's esophagus and dyspepsia - c/w omeprazole 40mg daily  4  HLD - c/w atorvastatin therapy  5  tobacco use - actively cutting down  6  Urinary driblling - Will f/u symptomatically at next visit    7  External ear infection - will start cortisporin otic  8  Panic disorder - will start lexapro 5mg daily  F/U in 6 weeks  Chief Complaint  Chief Complaint Chronic Condition [facility]: Patient is here today for follow up of chronic conditions described in HPI  History of Present Illness  HPI: Pt comes in with complaints of continued bouts of self-limited SOB previously w/u with stress test being negative  His allergies have been uncontrolled this winter since stopping the fluticasone nasal spray  Has concerns about starting hep C treatment leading to nausea, hoping for medication for control  Has recent left ear pain with drainage recently  Hospital Based Practices Required Assessment:   Pain Assessment   the patient states they do not have pain  (on a scale of 0 to 10, the patient rates the pain at 0 )    Depression And Suicide Screen  No, the patient has not had thoughts of hurting themself  No, the patient has not felt depressed in the past 7 days  Prefered Language is  english  Primary Language is  english  Review of Systems  Complete-Male:   Constitutional: no fever and no chills  Cardiovascular: chest pain and palpitations, but as noted in HPI  Respiratory: shortness of breath and wheezing, but as noted in HPI and no cough  Gastrointestinal: no nausea, no vomiting, no constipation and no diarrhea  Genitourinary: complaints of urinary dribbling x1yr, but no dysuria, no incontinence and no nocturia  Musculoskeletal: limb pain, but as noted in HPI  Integumentary: no rashes and no skin lesions  Neurological: no numbness and no tingling  Psychiatric: no anxiety and no depression  Active Problems    1  Allergic rhinitis (477 9) (J30 9)   2  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)   3  Chest pain (786 50) (R07 9)   4  Cirrhosis of liver (571 5) (K74 60)   5  Colon cancer screening (V76 51) (Z12 11)   6  Degenerative cervical disc (722 4) (M50 30)   7  Dyslipidemia (272 4) (E78 5)   8   Dyspepsia (536  8) (K30)   9  GERD (gastroesophageal reflux disease) (530 81) (K21 9)   10  Hepatitis C (070 70) (B19 20)   11  Need for TD vaccine (V06 5) (Z23)   12  Need for vaccination for pneumococcus (V03 82) (Z23)   13  Palpitations (785 1) (R00 2)   14  Screening for colon cancer (V76 51) (Z12 11)   15  Shortness of breath (786 05) (R06 02)    Surgical History    1  History of Appendectomy    Family History    1  Family history of hyperlipidemia (V18 19) (Z83 49)   2  Family history of malignant neoplasm (V16 9) (Z80 9)    3  Family history of myocardial infarction (V17 3) (Z82 49)    4  Family history of lung cancer (V16 1) (Z80 1)    5  Family history of Unknown whether patient has any health problems    6  Family history of lung cancer (V16 1) (Z80 1)   7  Family history of myocardial infarction (V17 3) (Z82 49)    8  Family history of Unknown whether patient has any health problems    Social History    · Current every day smoker (305 1) (F17 200)   · Four children   · History of drug use (305 93) (F19 21)   · No alcohol use   · On permanent disability   · Single   · Smokes less than 1 pack of cigarettes per day (305 1) (F17 210)   · Unemployed, not looking for work  Social History Reviewed: The social history was reviewed and updated today  The social history was reviewed and is unchanged  Current Meds   1  Aspirin 81 MG Oral Tablet; TAKE 1 TABLET DAILY; Therapy: 01RFV7456 to (Evaluate:63Pka5764)  Requested for: 05IDQ7853; Last Rx:20Nov2015   Ordered   2  Atorvastatin Calcium 20 MG Oral Tablet; TAKE 1 TABLET DAILY AT BEDTIME; Therapy: 15COT5234 to (Evaluate:27Xal5549)  Requested for: 05KIG8719; Last Rx:29Jan2016   Ordered   3  Harvoni  MG Oral Tablet; TAKE 1 TABLET DAILY; Therapy: 73MQZ2103 to (Evaluate:08Jun2016); Last Rx:30Mhb3345 Ordered   4  Loratadine 10 MG Oral Tablet; TAKE 1 TABLET BY MOUTH EVERY DAY; Therapy: 22WMU9370 to (Evaluate:20Mar2016)  Requested for: 28UMO8189;  Last Rx:82Hgw3799 Ordered   5  Naproxen 500 MG Oral Tablet; TAKE 1 TABLET EVERY 12 HOURS WITH FOOD AS NEEDED; Therapy: 73DQX7225 to (Evaluate:23Dec2015)  Requested for: 51CUT9590; Last Rx:73Ltj1037   Ordered   6  Omeprazole 40 MG Oral Capsule Delayed Release; TAKE 1 CAPSULE DAILY; Therapy: 75ULR4230 to (Evaluate:29Jan2016)  Requested for: 08GNG6063; Last Rx:66Dgx7001   Ordered   7  Ribavirin 200 MG Oral Tablet; 200 mg tablets  Take 3 tablets twice daily x 28 days; Therapy: 42SRF0350 to (Evaluate:23Jun2016); Last XP:35NWB8957 Ordered   8  Loran Dakota 12 5-75-50 &250 MG Oral Tablet Therapy Pack; take as directed; Therapy: 21MVQ0745 to (Evaluate:07Kxn4523); Last Rx:18Jan2016 Ordered  Medication List Reviewed: The medication list was reviewed and updated today  Allergies    1  Morphine Sulfate POWD    Vitals  Vital Signs [Data Includes: Current Encounter]    Recorded: 63Zgz5662 03:51PM   Temperature 98 2 F   Heart Rate 86   Systolic 452   Diastolic 80   Height 6 ft 2 in   Weight 221 lb 5 44 oz   BMI Calculated 28 42   BSA Calculated 2 27     Physical Exam    Constitutional   General appearance: No acute distress, well appearing and well nourished  Eyes   Pupils and irises: Equal, round and reactive to light  Ears, Nose, Mouth, and Throat   Otoscopic examination: Abnormal   left tympanic membrane intact with external canal erythema  Oropharynx: Normal with no erythema, edema, exudate or lesions  Pulmonary   Respiratory effort: No increased work of breathing or signs of respiratory distress  Auscultation of lungs: Clear to auscultation, equal breath sounds bilaterally, no wheezes, no rales, no rhonci  Cardiovascular   Palpation of heart: Normal PMI, no thrills  Auscultation of heart: Normal rate and rhythm, normal S1 and S2, without murmurs  Abdomen   Abdomen: Non-tender, no masses      Psychiatric   Orientation to person, place and time: Normal     Mood and affect: Normal          Health Management  Colon cancer screening   COLONOSCOPY; every 10 years; Next Due: 93QWP4814; Overdue  Need for TD vaccine   Td; every 10 years; Next Due: 57ICJ3914; Overdue  Need for vaccination for pneumococcus   Pneumo (Pneumovax); every 5 years; Last 47PUU6113; Next Due: 64EEV4942; Active    Attending Note  Attending Note: Attending Note: I discussed the case with the Resident and reviewed the Resident's note, I supervised the Resident and I agree with the Resident management plan as it was presented to me  Level of Participation: I was present in clinic, but did not examine the patient  Patient's History: 44 yo M with h/o chronic Hep C, genotype 1a, presents for eval/mgmt of acute otitis externa and panic attacks  Pt worried about possible N/V asso with multidrug regimen for chronic Hep C  Diagnosis and Plan: HCV cirrhosis- pt to begin Harvoni/ribaviron and Viekira per GI  Will give Rx for zofran to address potential side effect of N/V  Panic atack -will initiate Lexapro  Otitis externa- Rx with cortisporin otic soln , proper instillation reviewed  Allergic rhinits- onsteroid nasal spray  Tob abuse -cessation recommended  Urinary dribbling- will address at next appt  I agree with the Resident's note        Signatures   Electronically signed by : Suleiman Martinez DO; Feb 2 2016  4:57PM EST                       (Author)    Electronically signed by : JODIE Ortega ; Feb 2 2016  5:03PM EST                       (Author)

## 2018-01-17 NOTE — RESULT NOTES
Message    Hepatitis C PCR negative  Sj Lamb to review labs  Patient aware of results, completed therapy end of July  Verified Results  (1) HEP C RNA PCR, QUANTITATIVE 97Doy7181 10:34AM Heather Hanna Order Number: [id number]     Test Name Result Flag Reference   HCV PCR QUANTITATIVE      HCV Not Detected IU/mL   TEST INFORMATION Comment     The quantitative range of this assay is 15 IU/mL to 100 million IU/mL      Performed at:  [facility] [address]  [id number]  : Grazyna Luna MD, Phone:  [phone]       Signatures   Electronically signed by : Gill Curran, ; Aug 16 2016  3:24PM EST                       (Author)

## 2018-01-17 NOTE — RESULT NOTES
Message    Hep C PCR is negative  Rella Bamberger to talk to patient    Patient aware of results  F/U appointment scheduled in August  Patient is in week 23 of 24 week duration treatment with viekira  1       1 Amended By: Galina Acosta; Jul 15 2016 10:19 AM EST    Verified Results  (1) HEP C RNA PCR, QUANTITATIVE 28Jun2016 10:34AM Ramiro Trannando Order Number: [id number]  TW Order Number: [id number]     Test Name Result Flag Reference   HCV PCR QUANTITATIVE      HCV Not Detected IU/mL   TEST INFORMATION Comment     The quantitative range of this assay is 15 IU/mL to 100 million IU/mL      Performed at:  [facility] [place] [address] [facility] [address]  [id number]  : Aaron Cesar MD, Phone:  [phone]       Signatures   Electronically signed by : Galina Acosta, ; Jul 15 2016 10:20AM EST                       (Author)

## 2018-01-18 NOTE — RESULT NOTES
Message   Recorded as Task   Date: 08/04/2016 04:16 PM, Created By: Nancy Covarrubias   Task Name: Follow Up   Assigned To: [facility],Team   Regarding Patient: Edgardo Calabrese, Status: Active   CommentCarliss Mckeon - 04 Aug 2016 4:16 PM     TASK CREATED  Please inform patient of his MRI result:    Multilevel degenerative changes of the cervical spine  At C5/C6 - there is a diffuse disc bulge with bilateral facet arthropathy causing severe spinal canal stenosis and severe left neural foraminal narrowing  Fusion of the C2 to C4 vertebral bodies with kyphotic angulation similar in appearance when compared to a CT scan of the cervical spine dated July 12, 2014  This correlates wtih patients symptoms  Because it demonstrates severe spnal canal stenosis and patient has weakness I will place order for Neurosurgical consult  Please mail to patient  Ema Paci - 05 Aug 8996 1:64 AM     TASK EDITED   Ni Jalloh - 05 Aug 2016 8:30 AM     TASK EDITED  Pt  aware and referral to be put in mail today          Signatures   Electronically signed by : Janesas Allen, ; Aug  5 2016  8:38AM EST                       (Author)

## 2018-01-23 ENCOUNTER — HOSPITAL ENCOUNTER (EMERGENCY)
Facility: HOSPITAL | Age: 53
Discharge: HOME | End: 2018-01-23
Attending: EMERGENCY MEDICINE
Payer: COMMERCIAL

## 2018-01-23 VITALS
SYSTOLIC BLOOD PRESSURE: 196 MMHG | DIASTOLIC BLOOD PRESSURE: 84 MMHG | WEIGHT: 230 LBS | BODY MASS INDEX: 29.53 KG/M2 | RESPIRATION RATE: 18 BRPM | HEART RATE: 89 BPM | OXYGEN SATURATION: 97 % | TEMPERATURE: 98 F

## 2018-01-23 DIAGNOSIS — H60.92 LEFT OTITIS EXTERNA: Primary | ICD-10-CM

## 2018-01-23 DIAGNOSIS — H92.02 LEFT EAR PAIN: ICD-10-CM

## 2018-01-23 PROCEDURE — 99282 EMERGENCY DEPT VISIT SF MDM: CPT

## 2018-01-23 RX ORDER — CIPROFLOXACIN AND DEXAMETHASONE 3; 1 MG/ML; MG/ML
4 SUSPENSION/ DROPS AURICULAR (OTIC) 2 TIMES DAILY
Qty: 7.5 ML | Refills: 0 | Status: SHIPPED | OUTPATIENT
Start: 2018-01-23 | End: 2018-02-23

## 2018-01-23 RX ORDER — OXYMETAZOLINE HYDROCHLORIDE 0.05 G/100ML
2 SPRAY NASAL ONCE
Status: COMPLETED | OUTPATIENT
Start: 2018-01-23 | End: 2018-01-23

## 2018-01-23 RX ORDER — CIPROFLOXACIN AND DEXAMETHASONE 3; 1 MG/ML; MG/ML
4 SUSPENSION/ DROPS AURICULAR (OTIC) 2 TIMES DAILY
Status: DISCONTINUED | OUTPATIENT
Start: 2018-01-23 | End: 2018-01-23

## 2018-01-23 RX ADMIN — OXYMETAZOLINE HYDROCHLORIDE 2 SPRAY: 5 SPRAY NASAL at 14:09

## 2018-01-23 NOTE — ED ATTENDING ATTESTATION
Alfie Garcia DO, saw and evaluated the patient  I have discussed the patient with the resident/non-physician practitioner and agree with the resident's/non-physician practitioner's findings, Plan of Care, and MDM as documented in the resident's/non-physician practitioner's note, except where noted  All available labs and Radiology studies were reviewed  At this point I agree with the current assessment done in the Emergency Department  I have conducted an independent evaluation of this patient a history and physical is as follows:    47 yom with left earache  BP (!) 196/84 (BP Location: Left arm)   Pulse 89   Temp 98 °F (36 7 °C) (Oral)   Resp 18   Wt 104 kg (230 lb)   SpO2 97%   BMI 29 53 kg/m²   External ear canal slightly swollen  TM appears scarred but not infected  No mastoid tenderness   Otherwise NAD    ciprodex ear drops prescribed       Dx  Possible early otitis externa     DC    Critical Care Time  CritCare Time    Procedures

## 2018-01-23 NOTE — DISCHARGE INSTRUCTIONS
Earache   WHAT YOU NEED TO KNOW:   An earache can be caused by a problem within your ear or from another body area  Common causes include earwax buildup, objects in your ear, injury, infections, or jaw or dental problems  Less often, earaches may be caused by arthritis in your upper spine  DISCHARGE INSTRUCTIONS:   Return to the emergency department if:   · You have a severe earache  · You have ear pain with itching, hearing loss, dizziness, a feeling of fullness in your ear, or ringing in your ears  Contact your healthcare provider if:   · Your ear pain worsens or does not go away with treatment  · You have drainage from your ear  · You have a fever  · Your outer ear becomes red, swollen, and warm  · You have questions or concerns about your condition or care  Medicines: You may need any of the following:  · NSAIDs , such as ibuprofen, help decrease swelling, pain, and fever  This medicine is available with or without a doctor's order  NSAIDs can cause stomach bleeding or kidney problems in certain people  If you take blood thinner medicine, always ask if NSAIDs are safe for you  Always read the medicine label and follow directions  Do not give these medicines to children under 10months of age without direction from your child's healthcare provider  · Acetaminophen  decreases pain and fever  It is available without a doctor's order  Ask how much to take and how often to take it  Follow directions  Acetaminophen can cause liver damage if not taken correctly  · Do not give aspirin to children under 25years of age  Your child could develop Reye syndrome if he takes aspirin  Reye syndrome can cause life-threatening brain and liver damage  Check your child's medicine labels for aspirin, salicylates, or oil of wintergreen  · Take your medicine as directed  Call your healthcare provider if you think your medicine is not helping or if you have side effects   Tell him if you are allergic to any medicine  Keep a list of the medicines, vitamins, and herbs you take  Include the amounts, and when and why you take them  Bring the list or the pill bottles to follow-up visits  Carry your medicine list with you in case of an emergency  Follow up with your healthcare provider as directed:  Write down your questions so you remember to ask them during your visits  © 2017 [employer] Information is for End User's use only and may not be sold, redistributed or otherwise used for commercial purposes  All illustrations and images included in CareNotes® are the copyrighted property of [employer]  or [employer]  The above information is an  only  It is not intended as medical advice for individual conditions or treatments  Talk to your doctor, nurse or pharmacist before following any medical regimen to see if it is safe and effective for you  Otitis Externa   WHAT YOU NEED TO KNOW:   What is otitis externa? Otitis externa, or swimmer's ear, is an infection in the outer ear canal  This canal goes from the outside of the ear to the eardrum  What causes otitis externa? Otitis externa is most commonly caused by bacteria  It can also be caused by damage to the skin lining your outer ear canal  You can scratch or damage the skin lining when you put cotton swabs or other objects in your ears  What increases my risk for otitis externa? · Swimming    · Hot, humid weather    · Hearing aid use    · A lot of ear wax    · Allergic skin disorders, such as eczema    · Medical conditions that make it easier to get infections, such as diabetes  What are the signs and symptoms of otitis externa? · You have ear pain  · Your outer ear canal is red and swollen  · You have clear fluid or pus leaking out of your ear  · Your outer ear canal is itchy and you see a rash  · You have trouble hearing because your ear is plugged      · You feel a bump in your ear canal, called a polyp  · Flakes of skin fall from your ear  How is otitis externa diagnosed? Your healthcare provider will ask about your signs and symptoms  He will look inside your ears  He may blow a puff of air inside your ears  These tests tell healthcare providers if your eardrums look healthy  You may also need a hearing test    How is otitis externa treated? · NSAIDs , such as ibuprofen, help decrease swelling, pain, and fever  This medicine is available with or without a doctor's order  NSAIDs can cause stomach bleeding or kidney problems in certain people  If you take blood thinner medicine, always ask if NSAIDs are safe for you  Always read the medicine label and follow directions  Do not give these medicines to children under 10months of age without direction from your child's healthcare provider  · Acetaminophen  decreases pain and fever  It is available without a doctor's order  Ask how much to take and how often to take it  Follow directions  Acetaminophen can cause liver damage if not taken correctly  · Ear drops  that contain an antibiotic may be given  The antibiotic helps treat a bacterial infection  You may also be given steroid medicine  The steroid helps decrease redness, swelling, and pain  · Ear wicking  removes fluid or wax from your outer ear canal  Healthcare providers may insert a small tube, called a wick, into your ear to help drain fluid  A wick also may be used to put medicine into your ear canal if the canal is blocked  How do I use eardrops? · Lie down on your side with your infected ear facing up  · Carefully drip the correct number of eardrops into your ear  Have another person help you if possible  · Gently move the outside part of your ear back and forth to help the medicine reach your ear canal      · Stay lying down in the same position (with your ear facing up) for 3 to 5 minutes  How can I prevent otitis externa?    · Do not put cotton swabs or foreign objects in your ears  · Wrap a clean moist washcloth around your finger, and use it to clean your outer ear and remove extra ear wax  · Use ear plugs when you swim  Dry your outer ears completely after you swim or bathe  When should I seek immediate care? · You have severe ear pain  · You are suddenly unable to hear at all  · You have new swelling in your face, behind your ears, or in your neck  · You suddenly cannot move part of your face  · Your face suddenly feels numb  When should I contact my healthcare provider? · You have a fever  · Your signs and symptoms do not get better after 2 days of treatment  · Your signs and symptoms go away for a time, but then come back  · You have questions or concerns about your condition or care  CARE AGREEMENT:   You have the right to help plan your care  Learn about your health condition and how it may be treated  Discuss treatment options with your caregivers to decide what care you want to receive  You always have the right to refuse treatment  The above information is an  only  It is not intended as medical advice for individual conditions or treatments  Talk to your doctor, nurse or pharmacist before following any medical regimen to see if it is safe and effective for you  © 2017 [employer]  Information is for End User's use only and may not be sold, redistributed or otherwise used for commercial purposes  All illustrations and images included in CareNotes® are the copyrighted property of [employer]  or [employer]

## 2018-01-23 NOTE — ED PROVIDER NOTES
History  Chief Complaint   Patient presents with    Earache     Pt c/o left ear pain since yesterday  50-year-old male comes in for evaluation of left ear pain  States has had URI cough; cold rhinorrhea; for the prior 3 days  States then today those symptoms resolving; he believes he might have had the flu  He states now left ear pain; especially when goes from laying to standing and standing to laying; he feels like his ear is full  There is no headache or dizziness  There is no syncope; loss of consciousness  The ear is slightly tender when he sticks Q-tips in his ear  No chest pain no shortness of breath; no abdominal pain  No fevers or chills  No motor findings  No numbness tingling or weakness  Patient is a GCS 15 and appropriate  He does have a history of hepatitis C and he is also currently on Flonase and corticosporin ear drops  He states he has the ear drops because he gets this frequently and normally resolves with the ear drops  So when this started he did start taking them again yesterday  However he is nearly out of them  Patient appears systemically well  He has no external skin changes  He has no mastoid or tragal tenderness  Does have mild discomfort when the speculum in left ear  Left TM appears slightly edematous but with cone of light  Prior to Admission Medications   Prescriptions Last Dose Informant Patient Reported? Taking? albuterol (PROVENTIL HFA,VENTOLIN HFA) 90 mcg/act inhaler  Self Yes Yes   Sig: Inhale 2 puffs every 4 (four) hours as needed for wheezing  atorvastatin (LIPITOR) 20 mg tablet  Self Yes Yes   Sig: Take 20 mg by mouth daily  omeprazole (PriLOSEC) 20 mg delayed release capsule  Self Yes Yes   Sig: Take 20 mg by mouth daily        Facility-Administered Medications: None       Past Medical History:   Diagnosis Date    Peterson's esophagus     COPD (chronic obstructive pulmonary disease) ([facility])     Hep C w/o coma, chronic ([facility] [address] )     treated  History of neck problems     Hypertension        Past Surgical History:   Procedure Laterality Date    APPENDECTOMY      ESOPHAGOGASTRODUODENOSCOPY      ME ESOPHAGOGASTRODUODENOSCOPY TRANSORAL DIAGNOSTIC N/A 11/30/2017    Procedure: ESOPHAGOGASTRODUODENOSCOPY (EGD); Surgeon: Jade Verma DO;  Location: [facility]; Service: Gastroenterology       History reviewed  No pertinent family history  I have reviewed and agree with the history as documented  Social History   Substance Use Topics    Smoking status: Current Every Day Smoker     Packs/day: 0 50     Types: Cigarettes    Smokeless tobacco: Never Used      Comment: since 14 yrs old    Alcohol use No        Review of Systems   Constitutional: Negative for activity change, appetite change, chills and fever  HENT: Positive for ear pain  Negative for congestion, rhinorrhea and sore throat  Eyes: Negative for photophobia and pain  Respiratory: Negative for cough, chest tightness, shortness of breath and wheezing  Cardiovascular: Negative for chest pain, palpitations and leg swelling  Gastrointestinal: Negative for abdominal distention, abdominal pain, constipation, diarrhea and nausea  Genitourinary: Negative for decreased urine volume, dysuria, flank pain, frequency and hematuria  Musculoskeletal: Negative for arthralgias, back pain, myalgias, neck pain and neck stiffness  Skin: Negative for color change and rash  Neurological: Negative for dizziness, seizures, syncope, facial asymmetry, speech difficulty, weakness, light-headedness, numbness and headaches  Hematological: Negative for adenopathy  Psychiatric/Behavioral: Negative for agitation, confusion, hallucinations and suicidal ideas         Physical Exam  ED Triage Vitals [01/23/18 1323]   Temperature Pulse Respirations Blood Pressure SpO2   98 °F (36 7 °C) 89 18 (!) 196/84 97 %      Temp Source Heart Rate Source Patient Position - Orthostatic VS BP Location FiO2 (%) Oral Monitor Sitting Left arm --      Pain Score       No Pain           Orthostatic Vital Signs  Vitals:    01/23/18 1323   BP: (!) 196/84   Pulse: 89   Patient Position - Orthostatic VS: Sitting       Physical Exam   Constitutional: He is oriented to person, place, and time  He appears well-developed and well-nourished  No distress  HENT:   Head: Normocephalic and atraumatic  Right Ear: External ear normal    Mouth/Throat: No oropharyngeal exudate  Right ear normal cone of light  Left TM cone of light but appears slightly opaque  Left ear canal mildly red  No tragal discomfort no mastoid tenderness   Eyes: EOM are normal  Pupils are equal, round, and reactive to light  Right eye exhibits no discharge  Left eye exhibits no discharge  No scleral icterus  Neck: Normal range of motion  Neck supple  No JVD present  No tracheal deviation present  Full range of motion of neck  GCS 15  No findings in the throat  No lymphadenopathy  Cardiovascular: Normal rate and normal heart sounds  No murmur heard  Pulmonary/Chest: Effort normal and breath sounds normal  No respiratory distress  He has no wheezes  He has no rales  Abdominal: Soft  Bowel sounds are normal  He exhibits no distension  There is no tenderness  There is no rebound and no guarding  Musculoskeletal: Normal range of motion  He exhibits no edema or deformity  Lymphadenopathy:     He has no cervical adenopathy  Neurological: He is alert and oriented to person, place, and time  No cranial nerve deficit  He exhibits normal muscle tone  Skin: Skin is warm and dry  He is not diaphoretic  No erythema  No pallor  Psychiatric: He has a normal mood and affect   His behavior is normal  Thought content normal        ED Medications  Medications   oxymetazoline (AFRIN) 0 05 % nasal spray 2 spray (2 sprays Each Nare Given 1/23/18 1409)       Diagnostic Studies  Results Reviewed     None                 No orders to display Procedures  Procedures      Phone Consults  ED Phone Contact    ED Course  ED Course                                MDM  Number of Diagnoses or Management Options  Left ear pain:   Left otitis externa:   Diagnosis management comments: Mild canal redness and edema  Trace effusion left TM  Normal cone of light left TM  Vital signs normal patient is afebrile  No headache or CNS findings  No mastoid or tragal tenderness  Likely otitis externa  Ciprodex drops  I did also advise about systolic hypertension patient states he is aware  Advised ENT follow-up due to multiple recurrences  Patient appears well and nontoxic  CritCare Time    Disposition  Final diagnoses:   Left otitis externa   Left ear pain     Time reflects when diagnosis was documented in both MDM as applicable and the Disposition within this note     Time User Action Codes Description Comment    1/23/2018  2:16 PM Brooklyn Deck Add [H60 92] Left otitis externa     1/23/2018  2:16 PM Brooklyn Deck Add [H92 02] Left ear pain       ED Disposition     ED Disposition Condition Comment    Discharge  Awilda Graham discharge to home/self care  Condition at discharge: Good        Follow-up Information     Follow up With Specialties Details Why Contact Info    Ajay Cooper MD Otolaryngology Schedule an appointment as soon as possible for a visit Discuss ear pain [address]  [address] [facility]          Discharge Medication List as of 1/23/2018  2:18 PM      START taking these medications    Details   ciprofloxacin-dexamethasone (CIPRODEX) otic suspension Administer 4 drops into the left ear 2 (two) times a day, Starting Tue 1/23/2018, Print         CONTINUE these medications which have NOT CHANGED    Details   albuterol (PROVENTIL HFA,VENTOLIN HFA) 90 mcg/act inhaler Inhale 2 puffs every 4 (four) hours as needed for wheezing , Historical Med      atorvastatin (LIPITOR) 20 mg tablet Take 20 mg by mouth daily  , Historical Med      omeprazole (PriLOSEC) 20 mg delayed release capsule Take 20 mg by mouth daily  , Historical Med           No discharge procedures on file  ED Provider  Attending physically available and evaluated Cody Carrero I managed the patient along with the ED Attending      Electronically Signed by         Jamar Rogers DO  01/24/18 3385

## 2018-01-24 VITALS
SYSTOLIC BLOOD PRESSURE: 128 MMHG | DIASTOLIC BLOOD PRESSURE: 82 MMHG | WEIGHT: 226.19 LBS | HEIGHT: 74 IN | HEART RATE: 96 BPM | BODY MASS INDEX: 29.03 KG/M2 | TEMPERATURE: 97.2 F

## 2018-01-30 ENCOUNTER — APPOINTMENT (EMERGENCY)
Dept: RADIOLOGY | Facility: HOSPITAL | Age: 53
End: 2018-01-30
Payer: COMMERCIAL

## 2018-01-30 ENCOUNTER — HOSPITAL ENCOUNTER (EMERGENCY)
Facility: HOSPITAL | Age: 53
Discharge: HOME | End: 2018-01-30
Admitting: EMERGENCY MEDICINE
Payer: COMMERCIAL

## 2018-01-30 VITALS
RESPIRATION RATE: 20 BRPM | TEMPERATURE: 98 F | HEART RATE: 98 BPM | WEIGHT: 230 LBS | OXYGEN SATURATION: 97 % | DIASTOLIC BLOOD PRESSURE: 98 MMHG | BODY MASS INDEX: 29.53 KG/M2 | SYSTOLIC BLOOD PRESSURE: 166 MMHG

## 2018-01-30 DIAGNOSIS — J32.9 SINUSITIS: Primary | ICD-10-CM

## 2018-01-30 PROCEDURE — 99283 EMERGENCY DEPT VISIT LOW MDM: CPT

## 2018-01-30 PROCEDURE — 71046 X-RAY EXAM CHEST 2 VIEWS: CPT

## 2018-01-30 RX ORDER — AMOXICILLIN AND CLAVULANATE POTASSIUM 875; 125 MG/1; MG/1
1 TABLET, FILM COATED ORAL EVERY 12 HOURS SCHEDULED
Qty: 14 TABLET | Refills: 0 | Status: SHIPPED | OUTPATIENT
Start: 2018-01-30 | End: 2018-02-06

## 2018-01-30 NOTE — DISCHARGE INSTRUCTIONS

## 2018-01-30 NOTE — ED PROVIDER NOTES
History  Chief Complaint   Patient presents with    URI     Pt states that he was here last week and states that he was given ear drops for a block ear, nose spray for a head cold but states that he is not feeling any better  Pt states that he has been coughing and now has rib pain  Pt also states that he congestion is worse  58-year-old male who presents for evaluation of nasal congestion x2 weeks  He complains of maxillary sinus pressure  He states he has ongoing sinus problems but he does not regularly see an ENT  He was seen in the emergency department approximately 1 week ago for left ear pain and nasal congestion and was sent home with Afrin and treatment for an otitis externa at his left ear which he completed  He used the afrin for a few days with some relief  He states he continues to  feel pressure in his left ear but denies any otorrhea  Also complains of a dry cough worse in the evening  He admits to the anterior bilateral rib pain with coughing only  He has no rib pain at rest   He has not attempted any other alleviating factors  He denies any chest pain, shortness of breath, fevers, abdominal pain, vomiting, diarrhea  No recent travel, surgery or prolonged bed rest, no leg swelling, and no history of DVT/PE  Prior to Admission Medications   Prescriptions Last Dose Informant Patient Reported? Taking? albuterol (PROVENTIL HFA,VENTOLIN HFA) 90 mcg/act inhaler  Self Yes No   Sig: Inhale 2 puffs every 4 (four) hours as needed for wheezing  atorvastatin (LIPITOR) 20 mg tablet  Self Yes No   Sig: Take 20 mg by mouth daily  ciprofloxacin-dexamethasone (CIPRODEX) otic suspension   No No   Sig: Administer 4 drops into the left ear 2 (two) times a day   omeprazole (PriLOSEC) 20 mg delayed release capsule  Self Yes No   Sig: Take 20 mg by mouth daily        Facility-Administered Medications: None       Past Medical History:   Diagnosis Date    Anxiety     Asthma     Peterson's esophagus     Chest pain     Last assessed - 7/31/17    Chronic GERD     Last assessed - 3/11/16    COPD (chronic obstructive pulmonary disease) (HCC)     Hep C w/o coma, chronic (HCC)     treated    Hepatic disease     History of neck problems     Hypercholesterolemia     Hypertension     Kidney disease        Past Surgical History:   Procedure Laterality Date    APPENDECTOMY      ESOPHAGOGASTRODUODENOSCOPY      HERNIA REPAIR      Resolved     HI ESOPHAGOGASTRODUODENOSCOPY TRANSORAL DIAGNOSTIC N/A 11/30/2017    Procedure: ESOPHAGOGASTRODUODENOSCOPY (EGD); Surgeon: Nick Lagos DO;  Location: [facility]; Service: Gastroenterology       Family History   Problem Relation Age of Onset    Hyperlipidemia Mother     Cancer Mother      Malignant Neoplasm     Heart attack Father     Lung cancer Maternal Grandmother     Lung cancer Maternal Grandfather     Heart attack Maternal Grandfather     No Known Problems Paternal Grandmother     No Known Problems Paternal Grandfather     Other Other      Cardiac Disorder    Stroke Other      I have reviewed and agree with the history as documented  Social History   Substance Use Topics    Smoking status: Current Every Day Smoker     Packs/day: 0 50     Types: Cigarettes    Smokeless tobacco: Never Used      Comment: since 14 yrs old, Smokes less than 1 pack per day  per [employer]     Alcohol use No        Review of Systems   Constitutional: Negative for chills and fever  HENT: Positive for congestion, ear pain, rhinorrhea, sinus pain and sinus pressure  Negative for ear discharge, sore throat, trouble swallowing and voice change  Respiratory: Positive for cough  Negative for chest tightness, shortness of breath and wheezing  Cardiovascular: Negative for chest pain, palpitations and leg swelling  Gastrointestinal: Negative for abdominal pain, diarrhea, nausea and vomiting     Musculoskeletal: Negative for back pain, neck pain and neck stiffness  Skin: Negative for rash  Neurological: Negative for dizziness, weakness, light-headedness, numbness and headaches  Physical Exam  ED Triage Vitals [01/30/18 0956]   Temperature Pulse Respirations Blood Pressure SpO2   98 °F (36 7 °C) 98 20 166/98 97 %      Temp Source Heart Rate Source Patient Position - Orthostatic VS BP Location FiO2 (%)   Oral Monitor Sitting Left arm --      Pain Score       3           Orthostatic Vital Signs  Vitals:    01/30/18 0956   BP: 166/98   Pulse: 98   Patient Position - Orthostatic VS: Sitting       Physical Exam   Constitutional: He is oriented to person, place, and time  He appears well-developed and well-nourished  Non-toxic appearance  He does not have a sickly appearance  He does not appear ill  No distress  HENT:   Head: Normocephalic and atraumatic  Right Ear: Hearing, tympanic membrane, external ear and ear canal normal  No swelling or tenderness  No mastoid tenderness  Tympanic membrane is not perforated, not erythematous, not retracted and not bulging  No middle ear effusion  Left Ear: Hearing, tympanic membrane, external ear and ear canal normal  No swelling or tenderness  No mastoid tenderness  Tympanic membrane is not perforated, not erythematous, not retracted and not bulging  No middle ear effusion  Nose: Mucosal edema present  No rhinorrhea  Mouth/Throat: Oropharynx is clear and moist    Septal perforation   Eyes: Conjunctivae and EOM are normal  Pupils are equal, round, and reactive to light  Neck: Normal range of motion  Neck supple  Cardiovascular: Normal rate, regular rhythm and normal heart sounds  Exam reveals no gallop and no friction rub  No murmur heard  Pulmonary/Chest: Effort normal and breath sounds normal  No respiratory distress  He has no decreased breath sounds  He has no wheezes  He has no rhonchi  He has no rales  Musculoskeletal: Normal range of motion     Neurological: He is alert and oriented to person, place, and time  Skin: Skin is warm and dry  Capillary refill takes less than 2 seconds  No rash noted  He is not diaphoretic  Psychiatric: He has a normal mood and affect  Nursing note and vitals reviewed  ED Medications  Medications - No data to display    Diagnostic Studies  Results Reviewed     None                 XR chest 2 views   Final Result by Marcello Stewart DO (01/30 6627)   Stable appearance of the chest   No active pulmonary disease  Workstation performed: [id number]                    Procedures  Procedures       Phone Contacts  ED Phone Contact    ED Course  ED Course                                MDM  Number of Diagnoses or Management Options  Sinusitis: new and requires workup  Diagnosis management comments:   45 yo male who presents complaining of nasal congestion and sinus pressure x 2 weeks  He has ongoing problems with his sinuses and it appears 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  He was seen approximately 1 week ago for nasal congestion and left ear pain, was sent home with Afrin and otic drops for his left ear but He continues to feel pain in his left ear and congested  He also complains a productive cough - Chest x-ray was negative for consolidation  Will tx for sinusitis with augmentin  F/u with ENT and RTED for worsening  Patient verbalizes understanding and agrees with plan         Amount and/or Complexity of Data Reviewed  Tests in the radiology section of CPT®: ordered and reviewed  Decide to obtain previous medical records or to obtain history from someone other than the patient: yes  Review and summarize past medical records: yes  Independent visualization of images, tracings, or specimens: yes    Patient Progress  Patient progress: stable    CritCare Time    Disposition  Final diagnoses:   Sinusitis     Time reflects when diagnosis was documented in both MDM as applicable and the Disposition within this note     Time User Action Codes Description Comment 1/30/2018  1:38 PM Leigh Schaeffer Add [J32 9] Sinusitis       ED Disposition     ED Disposition Condition Comment    Discharge  [facility] discharge to home/self care  Condition at discharge: Good        Follow-up Information     Follow up With Specialties Details Why Contact Info Franc Cotto MD Otolaryngology   [address] [facility] ([place], [place])  [address] Emergency Department Emergency Medicine  If symptoms worsen - CHEST PAIN, [facility] [address]  [phone] 3 [facility] ED, [address], [place], [place], [address]        Discharge Medication List as of 1/30/2018  1:43 PM      START taking these medications    Details   amoxicillin-clavulanate (AUGMENTIN) 875-125 mg per tablet Take 1 tablet by mouth every 12 (twelve) hours for 7 days, Starting Tue 1/30/2018, Until Tue 2/6/2018, Normal         CONTINUE these medications which have NOT CHANGED    Details   albuterol (PROVENTIL HFA,VENTOLIN HFA) 90 mcg/act inhaler Inhale 2 puffs every 4 (four) hours as needed for wheezing , Historical Med      atorvastatin (LIPITOR) 20 mg tablet Take 20 mg by mouth daily  , Historical Med      ciprofloxacin-dexamethasone (CIPRODEX) otic suspension Administer 4 drops into the left ear 2 (two) times a day, Starting Tue 1/23/2018, Print      omeprazole (PriLOSEC) 20 mg delayed release capsule Take 20 mg by mouth daily  , Historical Med           No discharge procedures on file      ED Provider  Electronically Signed by           Deepak Jorge PA-C  02/08/18 5404

## 2018-02-12 ENCOUNTER — HOSPITAL ENCOUNTER (EMERGENCY)
Facility: HOSPITAL | Age: 53
Discharge: HOME | End: 2018-02-12
Attending: EMERGENCY MEDICINE | Admitting: EMERGENCY MEDICINE
Payer: COMMERCIAL

## 2018-02-12 VITALS
HEART RATE: 80 BPM | WEIGHT: 225 LBS | DIASTOLIC BLOOD PRESSURE: 99 MMHG | RESPIRATION RATE: 18 BRPM | OXYGEN SATURATION: 96 % | SYSTOLIC BLOOD PRESSURE: 137 MMHG | TEMPERATURE: 98.6 F | HEIGHT: 74 IN | BODY MASS INDEX: 28.88 KG/M2

## 2018-02-12 DIAGNOSIS — J32.9 RHINOSINUSITIS: Primary | ICD-10-CM

## 2018-02-12 DIAGNOSIS — J31.0 RHINOSINUSITIS: Primary | ICD-10-CM

## 2018-02-12 PROCEDURE — 99283 EMERGENCY DEPT VISIT LOW MDM: CPT

## 2018-02-12 RX ORDER — PREDNISONE 20 MG/1
50 TABLET ORAL DAILY
Qty: 13 TABLET | Refills: 0 | Status: SHIPPED | OUTPATIENT
Start: 2018-02-12 | End: 2018-02-17

## 2018-02-12 RX ORDER — FLUTICASONE PROPIONATE 50 MCG
1 SPRAY, SUSPENSION (ML) NASAL DAILY
Qty: 16 G | Refills: 0 | Status: SHIPPED | OUTPATIENT
Start: 2018-02-12 | End: 2018-03-02 | Stop reason: SDUPTHER

## 2018-02-12 NOTE — ED PROVIDER NOTES
History  Chief Complaint   Patient presents with    Earache     Pt c/o left sided earache for for 3 weeks  Reports chills and dizziness    Dizziness     HPI  The patient is a 69-year-old male with a history of GERD, hypertension, hepatitis-C who presents for continued symptoms of earache and URI symptoms  Reportedly this started about a month ago with left-sided ear pain and fullness as well as sinus pressure, and cough  He initially came to the ED on January 23rd of this year and was given Afrin and Cipro ear drops recommended to follow up with ENT  He states that he does not feel as if the ear drops Haldol and he is not even certain that he got them in because he felt like his ears 2 blocked  His symptoms continued and on January 30th of this year he presented to the ED again with progression of the same symptoms  On that visit he was given Augmentin  He says he completed the Augmentin about 5 days ago but has had continued symptoms and now has developed right-sided ear pain and fullness  Says that this limits his hearing  He says that he has felt very fatigued, cough productive of green mucus, runny nose, itchy and watery eyes  He denies any chest pain, palpitations, abdominal pain, nausea vomiting or diarrhea, as well as any documented fever  Prior to Admission Medications   Prescriptions Last Dose Informant Patient Reported? Taking? albuterol (PROVENTIL HFA,VENTOLIN HFA) 90 mcg/act inhaler  Self Yes Yes   Sig: Inhale 2 puffs every 4 (four) hours as needed for wheezing  atorvastatin (LIPITOR) 20 mg tablet  Self Yes Yes   Sig: Take 20 mg by mouth daily  ciprofloxacin-dexamethasone (CIPRODEX) otic suspension   No Yes   Sig: Administer 4 drops into the left ear 2 (two) times a day   omeprazole (PriLOSEC) 20 mg delayed release capsule  Self Yes Yes   Sig: Take 20 mg by mouth daily        Facility-Administered Medications: None       Past Medical History:   Diagnosis Date    Anxiety     Asthma  Peterson's esophagus     Chest pain     Last assessed - 7/31/17    Chronic GERD     Last assessed - 3/11/16    COPD (chronic obstructive pulmonary disease) (HCC)     Hep C w/o coma, chronic (HCC)     treated    Hepatic disease     History of neck problems     Hypercholesterolemia     Hypertension     Kidney disease        Past Surgical History:   Procedure Laterality Date    APPENDECTOMY      ESOPHAGOGASTRODUODENOSCOPY      HERNIA REPAIR      Resolved     DE ESOPHAGOGASTRODUODENOSCOPY TRANSORAL DIAGNOSTIC N/A 11/30/2017    Procedure: ESOPHAGOGASTRODUODENOSCOPY (EGD); Surgeon: Horace Aguilar DO;  Location: [facility]; Service: Gastroenterology       Family History   Problem Relation Age of Onset    Hyperlipidemia Mother     Cancer Mother      Malignant Neoplasm     Heart attack Father     Lung cancer Maternal Grandmother     Lung cancer Maternal Grandfather     Heart attack Maternal Grandfather     No Known Problems Paternal Grandmother     No Known Problems Paternal Grandfather     Other Other      Cardiac Disorder    Stroke Other      I have reviewed and agree with the history as documented  Social History   Substance Use Topics    Smoking status: Current Every Day Smoker     Packs/day: 0 50     Types: Cigarettes    Smokeless tobacco: Never Used      Comment: since 14 yrs old, Smokes less than 1 pack per day  per [employer]     Alcohol use No        Review of Systems   Constitutional: Positive for fatigue  HENT: Positive for congestion, ear pain, hearing loss, rhinorrhea, sinus pain, sinus pressure, sore throat and voice change  Eyes: Positive for discharge and itching  Respiratory: Positive for cough  Cardiovascular: Negative  Gastrointestinal: Negative  Genitourinary: Negative  Musculoskeletal: Negative  Neurological: Negative  All other systems reviewed and are negative        Physical Exam  ED Triage Vitals   Temperature Pulse Respirations Blood Pressure SpO2   02/12/18 1020 02/12/18 1020 02/12/18 1020 02/12/18 1020 02/12/18 1020   98 6 °F (37 °C) 86 18 146/91 96 %      Temp Source Heart Rate Source Patient Position - Orthostatic VS BP Location FiO2 (%)   02/12/18 1020 02/12/18 1020 02/12/18 1120 02/12/18 1120 --   Tympanic Monitor Lying Right arm       Pain Score       02/12/18 1020       No Pain           Orthostatic Vital Signs  Vitals:    02/12/18 1020 02/12/18 1120   BP: 146/91 137/99   Pulse: 86 80   Patient Position - Orthostatic VS:  Lying       Physical Exam   Constitutional: He appears well-developed and well-nourished  HENT:   Head: Normocephalic and atraumatic  Right Ear: External ear normal    Left Ear: External ear normal    Mouth/Throat: Oropharynx is clear and moist    Tympanic membranes normal    Eyes: Conjunctivae are normal  Pupils are equal, round, and reactive to light  Neck: Normal range of motion  Neck supple  Cardiovascular: Normal rate and regular rhythm  Pulmonary/Chest: Breath sounds normal    Abdominal: Soft  Bowel sounds are normal  There is no tenderness  Lymphadenopathy:     He has no cervical adenopathy  Neurological: He is alert  Skin: Skin is warm and dry  ED Medications  Medications - No data to display    Diagnostic Studies  Results Reviewed     None                 No orders to display         Procedures  Procedures      Phone Consults  ED Phone Contact    ED Course  ED Course          Patient is a 77-year-old male with 1 month of sinus and upper respiratory symptoms  He has ear pain has progressed to bilateral which she describes as fullness, sinus pain and pressure, nasal congestion, cough, itchy watery eyes  He had been given Cipro ear drops on January 23rd and returned on the 30th with continued symptoms and was given Augmentin which she completed 5 days ago    However he says that symptoms have continued and progressed to right bilateral   Is afebrile, unremarkable physical exam including the normal tympanic membranes, no enlarged nodes, regular heart sounds and clear lung sounds  His likely rounding sinusitis  Advised patient to continue with over-the-counter treatments, prescribed hernia 5 day course of prednisone as well as Flonase and advised the patient to follow up with his PCP [facility]  CritCare Time    Disposition  Final diagnoses:   Rhinosinusitis     Time reflects when diagnosis was documented in both MDM as applicable and the Disposition within this note     Time User Action Codes Description Comment    2/12/2018 11:44 AM Reema Abad [J32 9] Rhinosinusitis       ED Disposition     ED Disposition Condition Comment    Discharge  [facility] discharge to home/self care  Condition at discharge: Stable        Follow-up Information     Follow up With Specialties Details Why Contact Info    Mary Mcclure PA-C Internal Medicine Go to  [address] [place], [place] [id number] [facility]  [phone]          Discharge Medication List as of 2/12/2018 11:46 AM      START taking these medications    Details   fluticasone (FLONASE) 50 mcg/act nasal spray 1 spray into each nostril daily, Starting Mon 2/12/2018, Print      predniSONE 20 mg tablet Take 2 5 tablets (50 mg total) by mouth daily for 5 days, Starting Mon 2/12/2018, Until Sat 2/17/2018, Print         CONTINUE these medications which have NOT CHANGED    Details   albuterol (PROVENTIL HFA,VENTOLIN HFA) 90 mcg/act inhaler Inhale 2 puffs every 4 (four) hours as needed for wheezing , Historical Med      atorvastatin (LIPITOR) 20 mg tablet Take 20 mg by mouth daily  , Historical Med      ciprofloxacin-dexamethasone (CIPRODEX) otic suspension Administer 4 drops into the left ear 2 (two) times a day, Starting Tue 1/23/2018, Print      omeprazole (PriLOSEC) 20 mg delayed release capsule Take 20 mg by mouth daily  , Historical Med           No discharge procedures on file      ED Provider  Attending physically available and evaluated Dorene Hayden I managed the patient along with the ED Attending      Electronically Signed by    Jd La MD  [id number]     Jd La MD  02/12/18 5403

## 2018-02-12 NOTE — ED ATTENDING ATTESTATION
Alvin Liu MD, saw and evaluated the patient  I have discussed the patient with the resident/non-physician practitioner and agree with the resident's/non-physician practitioner's findings, Plan of Care, and MDM as documented in the resident's/non-physician practitioner's note, except where noted  All available labs and Radiology studies were reviewed  At this point I agree with the current assessment done in the Emergency Department    I have conducted an independent evaluation of this patient a history and physical is as follows:  Ear ache left for a month      Cough and congestion   cipro otic at that time   Then given augmentin    Now states fullness in the right ear   No HA no fever   Cough with green sputum no blood   Had cxr recently and negative    No documented fever    Exam nad perrl eomi  Nasal congestion   Tm ok  Serous effusion  Neck supple lungs clear heart rrr no  abd soft nt nd   Imp allergic rhinitis with serous otitis    Plan:  Short course of prednisone I do not feel the patient requires additional antibiotics  Critical Care Time    CritCare Time    Procedures

## 2018-02-12 NOTE — DISCHARGE INSTRUCTIONS
Rhinosinusitis   WHAT YOU NEED TO KNOW:   Rhinosinusitis (RS) is inflammation of your nose and sinuses  It commonly begins as a virus, often as a common cold  Viruses usually last 7 to 10 days and do not need treatment  When the virus does not get better on its own, you may have bacterial RS  This means that bacteria have begun to grow inside your sinuses  Acute RS lasts less than 4 weeks  Chronic RS lasts 12 weeks or more  Recurrent RS is when you have 4 or more episodes of RS in one year  DISCHARGE INSTRUCTIONS:   Return to the emergency department if:   · Your eye and eyelid are red, swollen, and painful  · You cannot open your eye  · You have double vision or you cannot see  · Your eyeball bulges out or you cannot move your eye  · You are more sleepy than normal or you notice changes in your ability to think, move, or talk  · You have a stiff neck, a fever, or a bad headache  · You have swelling of your forehead or scalp  Contact your healthcare provider if:   · Your symptoms are worse or do not improve after 3 to 5 days of treatment  · You have questions or concerns about your condition or care  Medicines: You may need any of the following:  · Acetaminophen  decreases pain and fever  It is available without a doctor's order  Ask how much to take and how often to take it  Follow directions  Acetaminophen can cause liver damage if not taken correctly  · NSAIDs , such as ibuprofen, help decrease swelling, pain, and fever  This medicine is available with or without a doctor's order  NSAIDs can cause stomach bleeding or kidney problems in certain people  If you take blood thinner medicine, always ask your healthcare provider if NSAIDs are safe for you  Always read the medicine label and follow directions  · Nasal steroid sprays  decrease inflammation in your nose and sinuses  · Decongestants  reduce swelling and drain mucus in the nose and sinuses   They may help you breathe easier  · Antihistamines  dry mucus in the nose and relieve sneezing  · Antibiotics  treat a bacterial infection and may be needed if your symptoms do not improve or they get worse  · Take your medicine as directed  Contact your healthcare provider if you think your medicine is not helping or if you have side effects  Tell him or her if you are allergic to any medicine  Keep a list of the medicines, vitamins, and herbs you take  Include the amounts, and when and why you take them  Bring the list or the pill bottles to follow-up visits  Carry your medicine list with you in case of an emergency  Self-care:   · Rinse your sinuses  Use a sinus rinse device to rinse your nasal passages with a saline (salt water) solution  This will help thin the mucus in your nose and rinse away pollen and dirt  It will also help reduce swelling so you can breathe normally  Ask your healthcare provider how often to do this  · Breathe in steam   Heat a bowl of water until you see steam  Lean over the bowl and make a tent over your head with a large towel  Breathe deeply for about 20 minutes  Be careful not to get too close to the steam or burn yourself  Do this 3 times a day  You can also breathe deeply when you take a hot shower  · Sleep with your head elevated  Place an extra pillow under your head before you go to sleep to help your sinuses drain  · Drink liquids as directed  Ask your healthcare provider how much liquid to drink each day and which liquids are best for you  Liquids will thin the mucus in your nose and help it drain  Avoid drinks that contain alcohol or caffeine  · Do not smoke, and avoid secondhand smoke  Nicotine and other chemicals in cigarettes and cigars can make your symptoms worse  Ask your healthcare provider for information if you currently smoke and need help to quit  E-cigarettes or smokeless tobacco still contain nicotine   Talk to your healthcare provider before you use these products  Follow up with your healthcare provider as directed: Follow up if your symptoms are worse or not better after 3 to 5 days of treatment  Write down your questions so you remember to ask them during your visits  © 2017 [address] [employer] Information is for End User's use only and may not be sold, redistributed or otherwise used for commercial purposes  All illustrations and images included in CareNotes® are the copyrighted property of [employer]  or [address]  The above information is an  only  It is not intended as medical advice for individual conditions or treatments  Talk to your doctor, nurse or pharmacist before following any medical regimen to see if it is safe and effective for you

## 2018-02-23 ENCOUNTER — HOSPITAL ENCOUNTER (EMERGENCY)
Facility: HOSPITAL | Age: 53
Discharge: HOME | End: 2018-02-23
Admitting: EMERGENCY MEDICINE
Payer: COMMERCIAL

## 2018-02-23 VITALS
OXYGEN SATURATION: 98 % | TEMPERATURE: 98.3 F | BODY MASS INDEX: 28.25 KG/M2 | SYSTOLIC BLOOD PRESSURE: 156 MMHG | RESPIRATION RATE: 18 BRPM | DIASTOLIC BLOOD PRESSURE: 111 MMHG | WEIGHT: 220 LBS | HEART RATE: 91 BPM

## 2018-02-23 DIAGNOSIS — H92.03 EAR PAIN, BILATERAL: ICD-10-CM

## 2018-02-23 DIAGNOSIS — H60.392 OTHER INFECTIVE ACUTE OTITIS EXTERNA OF LEFT EAR: ICD-10-CM

## 2018-02-23 DIAGNOSIS — Z91.09 ALLERGY TO ENVIRONMENTAL FACTORS: ICD-10-CM

## 2018-02-23 DIAGNOSIS — J30.9 ALLERGIC RHINOSINUSITIS: Primary | ICD-10-CM

## 2018-02-23 PROCEDURE — 99282 EMERGENCY DEPT VISIT SF MDM: CPT

## 2018-02-23 RX ORDER — PSEUDOEPHEDRINE HYDROCHLORIDE 30 MG/1
30 TABLET ORAL EVERY 8 HOURS PRN
Qty: 30 TABLET | Refills: 0 | Status: SHIPPED | OUTPATIENT
Start: 2018-02-23 | End: 2019-11-15

## 2018-02-23 RX ORDER — CIPROFLOXACIN AND DEXAMETHASONE 3; 1 MG/ML; MG/ML
4 SUSPENSION/ DROPS AURICULAR (OTIC) 2 TIMES DAILY
Qty: 3 ML | Refills: 0 | Status: SHIPPED | OUTPATIENT
Start: 2018-02-23 | End: 2018-03-02

## 2018-02-23 NOTE — ED PROVIDER NOTES
History  Chief Complaint   Patient presents with    Earache     Pt states that he has been here 4 times this month for the same L ear pain  Pt states that every time the abx are done his ear hurts again  Pt states that he is scheduled for the ENT on 3/08/18     46 y o male presents with c o  B/L ear pain and fullness, sinus congestion, post nasal drip, and cough x1 5months  Has been seen here for same symptoms 4 times, prescribed Flonase, Augmentin, prednisone and ciprodex with no real resolution of symptoms  States he believes his apartment has mold and feels this is contributing to these symptoms  Does have an appointment scheduled with ENT early in March  Denies fevers, chills, N/V/D, blurry vision, SOB, Cp, hives, sore throat  Prior to Admission Medications   Prescriptions Last Dose Informant Patient Reported? Taking? albuterol (PROVENTIL HFA,VENTOLIN HFA) 90 mcg/act inhaler  Self Yes Yes   Sig: Inhale 2 puffs every 4 (four) hours as needed for wheezing  atorvastatin (LIPITOR) 20 mg tablet  Self Yes Yes   Sig: Take 20 mg by mouth daily  fluticasone (FLONASE) 50 mcg/act nasal spray   No Yes   Si spray into each nostril daily   omeprazole (PriLOSEC) 20 mg delayed release capsule  Self Yes Yes   Sig: Take 20 mg by mouth daily        Facility-Administered Medications: None       Past Medical History:   Diagnosis Date    Anxiety     Asthma     Peterson's esophagus     Chest pain     Last assessed - 17    Chronic GERD     Last assessed - 3/11/16    COPD (chronic obstructive pulmonary disease) (HCC)     Hep C w/o coma, chronic (HCC)     treated    Hepatic disease     History of neck problems     Hypercholesterolemia     Hypertension     Kidney disease        Past Surgical History:   Procedure Laterality Date    APPENDECTOMY      ESOPHAGOGASTRODUODENOSCOPY      HERNIA REPAIR      Resolved     VT ESOPHAGOGASTRODUODENOSCOPY TRANSORAL DIAGNOSTIC N/A 2017    Procedure: ESOPHAGOGASTRODUODENOSCOPY (EGD); Surgeon: Yani Weathers DO;  Location: [facility]; Service: Gastroenterology       Family History   Problem Relation Age of Onset    Hyperlipidemia Mother     Cancer Mother      Malignant Neoplasm     Heart attack Father     Lung cancer Maternal Grandmother     Lung cancer Maternal Grandfather     Heart attack Maternal Grandfather     No Known Problems Paternal Grandmother     No Known Problems Paternal Grandfather     Other Other      Cardiac Disorder    Stroke Other      I have reviewed and agree with the history as documented  Social History   Substance Use Topics    Smoking status: Current Every Day Smoker     Packs/day: 0 50     Types: Cigarettes    Smokeless tobacco: Current User      Comment: since 14 yrs old, Smokes less than 1 pack per day  per [employer]     Alcohol use No        Review of Systems   Constitutional: Positive for fatigue  HENT: Positive for congestion, ear pain, hearing loss, postnasal drip, rhinorrhea, sinus pain, sinus pressure and sore throat  Respiratory: Positive for cough  All other systems reviewed and are negative  Physical Exam  ED Triage Vitals [02/23/18 1022]   Temperature Pulse Respirations Blood Pressure SpO2   98 3 °F (36 8 °C) 91 18 (!) 156/111 98 %      Temp Source Heart Rate Source Patient Position - Orthostatic VS BP Location FiO2 (%)   Tympanic Monitor Sitting Left arm --      Pain Score       6           Orthostatic Vital Signs  Vitals:    02/23/18 1022   BP: (!) 156/111   Pulse: 91   Patient Position - Orthostatic VS: Sitting       Physical Exam   Constitutional: He appears well-developed and well-nourished  HENT:   Head: Normocephalic and atraumatic  Right Ear: Tympanic membrane, external ear and ear canal normal    Left Ear: There is swelling and tenderness  No drainage  No foreign bodies  No mastoid tenderness  A middle ear effusion is present  Decreased hearing is noted     Nose: Mucosal edema and rhinorrhea present  Right sinus exhibits maxillary sinus tenderness and frontal sinus tenderness  Left sinus exhibits maxillary sinus tenderness and frontal sinus tenderness  Mouth/Throat: Uvula is midline, oropharynx is clear and moist and mucous membranes are normal  Tonsils are 0 on the right  Tonsils are 0 on the left  No tonsillar exudate  Eyes: Conjunctivae are normal    Cardiovascular: Normal rate, regular rhythm and normal heart sounds  Pulmonary/Chest: Effort normal and breath sounds normal    Abdominal: Soft  Bowel sounds are normal    Nursing note and vitals reviewed  ED Medications  Medications - No data to display    Diagnostic Studies  Results Reviewed     None                 No orders to display              Procedures  Procedures       Phone Contacts  ED Phone Contact    ED Course  ED Course                                MDM  Number of Diagnoses or Management Options  Allergic rhinosinusitis: new and requires workup  Allergy to environmental factors: new and requires workup  Ear pain, bilateral: new and requires workup  Other infective acute otitis externa of left ear: new and requires workup    CritCare Time    Disposition  Final diagnoses:   None     ED Disposition     None      Follow-up Information    None       Patient's Medications   Discharge Prescriptions    No medications on file     No discharge procedures on file      ED Provider  Electronically Signed by           Laura Martines PA-C  02/28/18 4245

## 2018-02-23 NOTE — DISCHARGE INSTRUCTIONS
Allergic Rhinitis   WHAT YOU NEED TO KNOW:   Allergic rhinitis, or hay fever, is swelling of the inside of your nose  The swelling is a reaction to allergens in the air  An allergen can be anything that causes an allergic reaction  Allergies to weeds, grass, trees, or mold often cause seasonal allergic rhinitis  Indoor dust mites, cockroaches, pet dander, or mold can also cause allergic rhinitis  DISCHARGE INSTRUCTIONS:   Call 911 for the following:   · You have chest pain or shortness of breath  Return to the emergency department if:   · You have severe pain  · You cough up blood  Contact your healthcare provider if:   · You have a fever  · You have ear or sinus pain, or a headache  · Your symptoms get worse, even after treatment  · You have yellow, green, brown, or bloody mucus coming from your nose  · Your nose is bleeding or you have pain inside your nose  · You have trouble sleeping because of your symptoms  · You have questions or concerns about your condition or care  Medicines:   · Medicines  help decrease your symptoms and clear your stuffy nose  · Take your medicine as directed  Contact your healthcare provider if you think your medicine is not helping or if you have side effects  Tell him of her if you are allergic to any medicine  Keep a list of the medicines, vitamins, and herbs you take  Include the amounts, and when and why you take them  Bring the list or the pill bottles to follow-up visits  Carry your medicine list with you in case of an emergency  How to manage allergic rhinitis:  The best way to manage allergic rhinitis is to avoid allergens that can trigger your symptoms  Any of the following may help decrease your symptoms:  · Rinse your nose and sinuses  with a salt water solution or use a salt water nasal spray  This will help thin the mucus in your nose and rinse away pollen and dirt  It will also help reduce swelling so you can breathe normally  Ask your healthcare provider how often to rinse your nose  · Reduce exposure to dust mites  Wash sheets and towels in hot water every week  Cover your pillows and mattresses with allergen-free covers  Limit the number of stuffed animals and soft toys your child has  Wash your child's toys in hot water regularly  Vacuum weekly and use a vacuum  with an air filter  If possible, get rid of carpets and curtains  These collect dust and dust mites  · Reduce exposure to pollen  Keep windows and doors closed in your house and car  Stay inside when air pollution or the pollen count is high  Run your air conditioner on recycle, and change air filters often  Shower and wash your hair before bed every night to rinse away pollen  · Reduce exposure to pet dander  If possible, do not keep cats, dogs, birds, or other pets  If you do keep pets in your home, keep them out of bedrooms and carpeted rooms  Bathe them often  · Reduce exposure to mold  Do not spend time in basements  Choose artificial plants instead of live plants  Keep your home's humidity at less than 45%  Do not have ponds or standing water in your home or yard  · Do not smoke  Avoid others who smoke  Ask your healthcare provider for information if you currently smoke and need help to quit  Follow up with your healthcare provider as directed: You may need to see an allergist often to control your symptoms  Write down your questions so you remember to ask them during your visits  © 2017 [address] Information is for End User's use only and may not be sold, redistributed or otherwise used for commercial purposes  All illustrations and images included in CareNotes® are the copyrighted property of [employer]  or [employer]  The above information is an  only  It is not intended as medical advice for individual conditions or treatments   Talk to your doctor, nurse or pharmacist before following any medical regimen to see if it is safe and effective for you  Earache   WHAT YOU NEED TO KNOW:   An earache can be caused by a problem within your ear or from another body area  Common causes include earwax buildup, objects in your ear, injury, infections, or jaw or dental problems  Less often, earaches may be caused by arthritis in your upper spine  DISCHARGE INSTRUCTIONS:   Return to the emergency department if:   · You have a severe earache  · You have ear pain with itching, hearing loss, dizziness, a feeling of fullness in your ear, or ringing in your ears  Contact your healthcare provider if:   · Your ear pain worsens or does not go away with treatment  · You have drainage from your ear  · You have a fever  · Your outer ear becomes red, swollen, and warm  · You have questions or concerns about your condition or care  Medicines: You may need any of the following:  · NSAIDs , such as ibuprofen, help decrease swelling, pain, and fever  This medicine is available with or without a doctor's order  NSAIDs can cause stomach bleeding or kidney problems in certain people  If you take blood thinner medicine, always ask if NSAIDs are safe for you  Always read the medicine label and follow directions  Do not give these medicines to children under 10months of age without direction from your child's healthcare provider  · Acetaminophen  decreases pain and fever  It is available without a doctor's order  Ask how much to take and how often to take it  Follow directions  Acetaminophen can cause liver damage if not taken correctly  · Do not give aspirin to children under 25years of age  Your child could develop Reye syndrome if he takes aspirin  Reye syndrome can cause life-threatening brain and liver damage  Check your child's medicine labels for aspirin, salicylates, or oil of wintergreen  · Take your medicine as directed    Call your healthcare provider if you think your medicine is not helping or if you have side effects  Tell him if you are allergic to any medicine  Keep a list of the medicines, vitamins, and herbs you take  Include the amounts, and when and why you take them  Bring the list or the pill bottles to follow-up visits  Carry your medicine list with you in case of an emergency  Follow up with your healthcare provider as directed:  Write down your questions so you remember to ask them during your visits  © 2017 [address] Information is for End User's use only and may not be sold, redistributed or otherwise used for commercial purposes  All illustrations and images included in CareNotes® are the copyrighted property of [employer]  or [employer]  The above information is an  only  It is not intended as medical advice for individual conditions or treatments  Talk to your doctor, nurse or pharmacist before following any medical regimen to see if it is safe and effective for you  How to Use Nasal Spray   WHAT YOU NEED TO KNOW:   Nasal sprays are available with or without a doctor's order  Use them as directed  DISCHARGE INSTRUCTIONS:   Contact your healthcare provider if:   · Your nose burns, stings, or is irritated after you use the spray  · Your nose starts to bleed after you use the spray  · You have questions or concerns about your condition or care  How to use nasal spray:  Read the instructions carefully before you use nasal spray  Store nasal sprays at room temperature and away from heat, moisture, and direct light  Wash your hands before and after you use nasal spray  Always blow your nose gently before you use nasal spray  This will help the medicine get deeply into your nose  · Remove the cap and shake the bottle  · Prime the bottle by spraying 1 time to get it ready  · Tilt your head until your chin is about shelter to your chest  Insert the nozzle into your nostril      · Point the nozzle slightly toward the side of your nostril, away from the middle of your nose  · Squeeze the bottle or push down on the top to spray the medicine into your nostril  · As you spray, breathe in through your nose  Breathe slowly so you do not get too much medicine quickly  · Repeat these steps for the other nostril, or as directed  · If possible, do not sneeze or blow your nose until a few minutes after you use the nasal spray  Follow up with your healthcare provider as directed:  Write down your questions so you remember to ask them during your visits  © 2017 [address] Information is for End User's use only and may not be sold, redistributed or otherwise used for commercial purposes  All illustrations and images included in CareNotes® are the copyrighted property of A [employer] M [employer]  or [employer]  The above information is an  only  It is not intended as medical advice for individual conditions or treatments  Talk to your doctor, nurse or pharmacist before following any medical regimen to see if it is safe and effective for you  Otitis Externa   WHAT YOU NEED TO KNOW:   Otitis externa, or swimmer's ear, is an infection in the outer ear canal  This canal goes from the outside of the ear to the eardrum  DISCHARGE INSTRUCTIONS:   Return to the emergency department if:   · You have severe ear pain  · You are suddenly unable to hear at all  · You have new swelling in your face, behind your ears, or in your neck  · You suddenly cannot move part of your face  · Your face suddenly feels numb  Contact your healthcare provider if:   · You have a fever  · Your signs and symptoms do not get better after 2 days of treatment  · Your signs and symptoms go away for a time, but then come back  · You have questions or concerns about your condition or care  Medicines:   · NSAIDs , such as ibuprofen, help decrease swelling, pain, and fever   This medicine is available with or without a doctor's order  NSAIDs can cause stomach bleeding or kidney problems in certain people  If you take blood thinner medicine, always ask if NSAIDs are safe for you  Always read the medicine label and follow directions  Do not give these medicines to children under 10months of age without direction from your child's healthcare provider  · Acetaminophen  decreases pain and fever  It is available without a doctor's order  Ask how much to take and how often to take it  Follow directions  Acetaminophen can cause liver damage if not taken correctly  · Ear drops  that contain an antibiotic may be given  The antibiotic helps treat a bacterial infection  You may also be given steroid medicine  The steroid helps decrease redness, swelling, and pain  · Take your medicine as directed  Contact your healthcare provider if you think your medicine is not helping or if you have side effects  Tell him or her if you are allergic to any medicine  Keep a list of the medicines, vitamins, and herbs you take  Include the amounts, and when and why you take them  Bring the list or the pill bottles to follow-up visits  Carry your medicine list with you in case of an emergency  Follow up with your healthcare provider as directed:  Write down your questions so you remember to ask them during your visits  How to use eardrops:   · Lie down on your side with your infected ear facing up  · Carefully drip the correct number of eardrops into your ear  Have another person help you if possible  · Gently move the outside part of your ear back and forth to help the medicine reach your ear canal      · Stay lying down in the same position (with your ear facing up) for 3 to 5 minutes  Prevent otitis externa:   · Do not put cotton swabs or foreign objects in your ears  · Wrap a clean moist washcloth around your finger, and use it to clean your outer ear and remove extra ear wax       · Use ear plugs when you swim  Dry your outer ears completely after you swim or bathe  © 2017 [address]  Information is for End User's use only and may not be sold, redistributed or otherwise used for commercial purposes  All illustrations and images included in CareNotes® are the copyrighted property of A [employer] M [employer]  or [employer]  The above information is an  only  It is not intended as medical advice for individual conditions or treatments  Talk to your doctor, nurse or pharmacist before following any medical regimen to see if it is safe and effective for you

## 2018-03-02 ENCOUNTER — OFFICE VISIT (OUTPATIENT)
Dept: MULTI SPECIALTY CLINIC | Facility: CLINIC | Age: 53
End: 2018-03-02
Payer: COMMERCIAL

## 2018-03-02 VITALS
WEIGHT: 220.9 LBS | DIASTOLIC BLOOD PRESSURE: 100 MMHG | HEIGHT: 74 IN | BODY MASS INDEX: 28.35 KG/M2 | SYSTOLIC BLOOD PRESSURE: 132 MMHG

## 2018-03-02 DIAGNOSIS — J32.9 SINUSITIS, UNSPECIFIED CHRONICITY, UNSPECIFIED LOCATION: ICD-10-CM

## 2018-03-02 DIAGNOSIS — H65.23 BILATERAL CHRONIC SEROUS OTITIS MEDIA: Primary | ICD-10-CM

## 2018-03-02 DIAGNOSIS — J31.0 RHINOSINUSITIS: ICD-10-CM

## 2018-03-02 DIAGNOSIS — K21.9 GASTROESOPHAGEAL REFLUX DISEASE WITHOUT ESOPHAGITIS: Primary | ICD-10-CM

## 2018-03-02 DIAGNOSIS — J32.9 RHINOSINUSITIS: ICD-10-CM

## 2018-03-02 PROCEDURE — 99243 OFF/OP CNSLTJ NEW/EST LOW 30: CPT | Performed by: OTOLARYNGOLOGY

## 2018-03-02 RX ORDER — CYCLOBENZAPRINE HCL 10 MG
1 TABLET ORAL
COMMUNITY
Start: 2017-12-27 | End: 2019-01-09 | Stop reason: SDUPTHER

## 2018-03-02 RX ORDER — FLUTICASONE PROPIONATE 50 MCG
1 SPRAY, SUSPENSION (ML) NASAL DAILY
Qty: 16 G | Refills: 0 | Status: SHIPPED | OUTPATIENT
Start: 2018-03-02 | End: 2018-07-12 | Stop reason: SDUPTHER

## 2018-03-02 RX ORDER — ALBUTEROL SULFATE 90 UG/1
AEROSOL, METERED RESPIRATORY (INHALATION)
COMMUNITY
Start: 2015-06-29 | End: 2019-12-02

## 2018-03-02 RX ORDER — ALCOHOL 62 ML/100ML
10 LIQUID TOPICAL DAILY
Qty: 30 TABLET | Refills: 1 | Status: SHIPPED | OUTPATIENT
Start: 2018-03-02 | End: 2018-05-24 | Stop reason: SDUPTHER

## 2018-03-02 RX ORDER — ATORVASTATIN CALCIUM 20 MG/1
20 TABLET, FILM COATED ORAL DAILY
Qty: 30 TABLET | Refills: 3 | Status: SHIPPED | OUTPATIENT
Start: 2018-03-02 | End: 2018-12-21 | Stop reason: SDUPTHER

## 2018-03-02 RX ORDER — SUMATRIPTAN 25 MG/1
1 TABLET, FILM COATED ORAL EVERY 2 HOUR PRN
COMMUNITY
Start: 2017-12-27 | End: 2019-11-15

## 2018-03-02 RX ORDER — OMEPRAZOLE 20 MG/1
20 CAPSULE, DELAYED RELEASE ORAL DAILY
Qty: 30 CAPSULE | Refills: 3 | Status: SHIPPED | OUTPATIENT
Start: 2018-03-02 | End: 2018-08-01 | Stop reason: SDUPTHER

## 2018-03-02 RX ORDER — NEOMYCIN SULFATE, POLYMYXIN B SULFATE, HYDROCORTISONE 3.5; 10000; 1 MG/ML; [USP'U]/ML; MG/ML
SOLUTION/ DROPS AURICULAR (OTIC)
Refills: 0 | COMMUNITY
Start: 2017-12-20 | End: 2019-05-14

## 2018-03-02 RX ORDER — ALCOHOL 62 ML/100ML
LIQUID TOPICAL
Refills: 0 | COMMUNITY
Start: 2017-12-16 | End: 2018-03-02 | Stop reason: SDUPTHER

## 2018-03-02 NOTE — LETTER
March 2, 2018     Cedric John, [address] [facility]    Patient: Nguyen Sawyer   YOB: 1965   Date of Visit: 3/2/2018       Dear Dr Edgard Fenton: Thank you for referring Nguyen Sawyer to me for evaluation  Below are my notes for this consultation  If you have questions, please do not hesitate to call me  I look forward to following your patient along with you  Sincerely,        Josef Blue MD        CC: No Recipients  Josef Blue MD  3/2/2018  2:28 PM  Sign at close encounter  [facility] Otolaryngology New Patient Visit    Nguyen Sawyer is a 46 y  o  who presents with a chief complaint of L blocked ear for one month  Recent URI and possible sinusitis  He has recently completed a course of oral ABX  The nose/sinus have improved but the ear continued to be blocked  No Pain    Pertinent elements of the history include:  Blocked L ear, hearing loss L      Review of systems   Review of Systems - Negative except   ENT ROS: L blocked ear/hearing loss  Allergy and Immunology ROS: negative  Respiratory ROS: no cough, shortness of breath, or wheezing  GI: neg  Neuro: neg    Results reviewed; images from any scan have been personally reviewed:    CT max/fac 2014 - wnl, no sinusitis    The past medical, surgical, social and family history have been reviewed as documented in today's record  Physical exam: (abnormal findings appear in bold and supercede any conflicting normal findings listed below)    /100 (BP Location: Left arm, Patient Position: Sitting, Cuff Size: Standard)   Ht 6' 2" (1 88 m)   Wt 100 kg (220 lb 14 4 oz)   BMI 28 36 kg/m²      Constitutional:  Well developed, well nourished and groomed, in no acute distress  Eyes:  Extra-ocular movements intact, pupils equally round and reactive to light and accommodation, the lids and conjunctivae are normal in appearance      Head: Atraumatic, normocephalic, no visible scalp lesions, bony palpation unremarkable without stepoffs, parotid and submandibular salivary glands non-tender to palpation and without masses bilaterally  Ears:  Auricles normal in appearance bilaterally, mastoid prominence non-tender, external auditory canals clear bilaterally, tympanic membranes intact bilaterally without evidence of middle ear effusion or masses on R, L ME effusion, normal appearing ossicles  Nose/Sinuses:  External appearance unremarkable, no maxillary or frontal sinus tenderness to palpation bilaterally  Anterior rhinoscopy reveals:   Septal perforation     Oral Cavity:  Moist mucus membranes, gums and dentition unremarkable, no oral mucosal masses or lesions, floor of mouth soft, tongue mobile without masses or lesions  Poor dentition    Oropharynx:  Base of tongue soft and without masses, tonsils bilaterally unremarkable, soft palate mucosa unremarkable, laryngeal mirror exam unrevealing  L uvula papilloma    Neck:  No visible or palpable cervical lesions or lymphadenopathy, thyroid gland is normal in size and symmetry and without masses, normal laryngeal elevation with swallowing  Cardiovascular:  Normal rate and rhythm, no palpable thrills, no jugulovenous distension observed  Respiratory:  Normal respiratory effort without evidence of retractions or use of accessory muscles  Integument:  Normal appearing without observed masses or lesions  Neurologic:  Cranial nerves II-XII intact bilaterally  Psychiatric:  Alert and oriented to time, place and person, normal affect  Procedures      Assessment:   1  Bilateral chronic serous otitis media     2  Sinusitis, unspecified chronicity, unspecified location         Orders  No orders of the defined types were placed in this encounter  Discussion/Plan:    1  Options discussed: observation v  L MT in [facility] office  RBA including perforation, drainage and hearing loss discussed  To f/u on Tuesday for Tube in office           Thank you for allowing me to participate in the care of your patient

## 2018-03-02 NOTE — PROGRESS NOTES
[facility] Otolaryngology New Patient Visit    Hanna Shaver is a 46 y  o  who presents with a chief complaint of L blocked ear for one month  Recent URI and possible sinusitis  He has recently completed a course of oral ABX  The nose/sinus have improved but the ear continued to be blocked  No Pain    Pertinent elements of the history include:  Blocked L ear, hearing loss L      Review of systems   Review of Systems - Negative except   ENT ROS: L blocked ear/hearing loss  Allergy and Immunology ROS: negative  Respiratory ROS: no cough, shortness of breath, or wheezing  GI: neg  Neuro: neg    Results reviewed; images from any scan have been personally reviewed:    CT max/fac 2014 - wnl, no sinusitis    The past medical, surgical, social and family history have been reviewed as documented in today's record  Physical exam: (abnormal findings appear in bold and supercede any conflicting normal findings listed below)    /100 (BP Location: Left arm, Patient Position: Sitting, Cuff Size: Standard)   Ht 6' 2" (1 88 m)   Wt 100 kg (220 lb 14 4 oz)   BMI 28 36 kg/m²     Constitutional:  Well developed, well nourished and groomed, in no acute distress  Eyes:  Extra-ocular movements intact, pupils equally round and reactive to light and accommodation, the lids and conjunctivae are normal in appearance  Head: Atraumatic, normocephalic, no visible scalp lesions, bony palpation unremarkable without stepoffs, parotid and submandibular salivary glands non-tender to palpation and without masses bilaterally  Ears:  Auricles normal in appearance bilaterally, mastoid prominence non-tender, external auditory canals clear bilaterally, tympanic membranes intact bilaterally without evidence of middle ear effusion or masses on R, L ME effusion, normal appearing ossicles  Nose/Sinuses:  External appearance unremarkable, no maxillary or frontal sinus tenderness to palpation bilaterally   Anterior rhinoscopy reveals: Septal perforation     Oral Cavity:  Moist mucus membranes, gums and dentition unremarkable, no oral mucosal masses or lesions, floor of mouth soft, tongue mobile without masses or lesions  Poor dentition    Oropharynx:  Base of tongue soft and without masses, tonsils bilaterally unremarkable, soft palate mucosa unremarkable, laryngeal mirror exam unrevealing  L uvula papilloma    Neck:  No visible or palpable cervical lesions or lymphadenopathy, thyroid gland is normal in size and symmetry and without masses, normal laryngeal elevation with swallowing  Cardiovascular:  Normal rate and rhythm, no palpable thrills, no jugulovenous distension observed  Respiratory:  Normal respiratory effort without evidence of retractions or use of accessory muscles  Integument:  Normal appearing without observed masses or lesions  Neurologic:  Cranial nerves II-XII intact bilaterally  Psychiatric:  Alert and oriented to time, place and person, normal affect  Procedures      Assessment:   1  Bilateral chronic serous otitis media     2  Sinusitis, unspecified chronicity, unspecified location         Orders  No orders of the defined types were placed in this encounter  Discussion/Plan:    1  Options discussed: observation v  L MT in [facility] ENT office  RBA including perforation, drainage and hearing loss discussed  To f/u on Tuesday for Tube in office  Thank you for allowing me to participate in the care of your patient

## 2018-04-10 DIAGNOSIS — J31.0 RHINOSINUSITIS: ICD-10-CM

## 2018-04-10 DIAGNOSIS — J32.9 RHINOSINUSITIS: ICD-10-CM

## 2018-04-11 RX ORDER — FLUTICASONE PROPIONATE 50 MCG
SPRAY, SUSPENSION (ML) NASAL
Qty: 16 G | Refills: 0 | OUTPATIENT
Start: 2018-04-11

## 2018-05-24 DIAGNOSIS — J31.0 RHINOSINUSITIS: ICD-10-CM

## 2018-05-24 DIAGNOSIS — J32.9 RHINOSINUSITIS: ICD-10-CM

## 2018-05-26 RX ORDER — LORATADINE 10 MG/1
TABLET ORAL
Qty: 30 TABLET | Refills: 3 | Status: SHIPPED | OUTPATIENT
Start: 2018-05-26 | End: 2018-10-19 | Stop reason: SDUPTHER

## 2018-07-12 DIAGNOSIS — J32.9 RHINOSINUSITIS: ICD-10-CM

## 2018-07-12 DIAGNOSIS — J31.0 RHINOSINUSITIS: ICD-10-CM

## 2018-07-12 RX ORDER — FLUTICASONE PROPIONATE 50 MCG
1 SPRAY, SUSPENSION (ML) NASAL DAILY
Qty: 16 G | Refills: 3 | Status: SHIPPED | OUTPATIENT
Start: 2018-07-12 | End: 2019-11-15

## 2018-08-01 DIAGNOSIS — K21.9 GASTROESOPHAGEAL REFLUX DISEASE WITHOUT ESOPHAGITIS: ICD-10-CM

## 2018-08-02 RX ORDER — OMEPRAZOLE 20 MG/1
20 CAPSULE, DELAYED RELEASE ORAL DAILY
Qty: 30 CAPSULE | Refills: 3 | Status: SHIPPED | OUTPATIENT
Start: 2018-08-02 | End: 2019-02-15 | Stop reason: SDUPTHER

## 2018-09-01 ENCOUNTER — HOSPITAL ENCOUNTER (EMERGENCY)
Facility: HOSPITAL | Age: 53
Discharge: HOME | End: 2018-09-01
Attending: EMERGENCY MEDICINE | Admitting: EMERGENCY MEDICINE
Payer: COMMERCIAL

## 2018-09-01 VITALS
RESPIRATION RATE: 18 BRPM | WEIGHT: 225 LBS | TEMPERATURE: 98.9 F | OXYGEN SATURATION: 98 % | HEART RATE: 94 BPM | DIASTOLIC BLOOD PRESSURE: 100 MMHG | BODY MASS INDEX: 28.89 KG/M2 | SYSTOLIC BLOOD PRESSURE: 137 MMHG

## 2018-09-01 DIAGNOSIS — H66.90 OTITIS MEDIA: ICD-10-CM

## 2018-09-01 DIAGNOSIS — J02.9 SORETHROAT: Primary | ICD-10-CM

## 2018-09-01 PROCEDURE — 99283 EMERGENCY DEPT VISIT LOW MDM: CPT

## 2018-09-01 RX ORDER — AMOXICILLIN AND CLAVULANATE POTASSIUM 875; 125 MG/1; MG/1
1 TABLET, FILM COATED ORAL EVERY 12 HOURS SCHEDULED
Qty: 14 TABLET | Refills: 0 | Status: SHIPPED | OUTPATIENT
Start: 2018-09-01 | End: 2018-09-08

## 2018-09-01 RX ORDER — IBUPROFEN 800 MG/1
800 TABLET ORAL 3 TIMES DAILY
Qty: 45 TABLET | Refills: 0 | Status: SHIPPED | OUTPATIENT
Start: 2018-09-01 | End: 2019-01-09

## 2018-09-01 NOTE — ED PROVIDER NOTES
History  Chief Complaint   Patient presents with    URI     Pt has c/o b/l ear pain and throat pain for 3 days     47 y/o male with pmh of left ear chronic otitis media, serous and 6 month ago myringotomy and been doing well, came to er c/o left ear pain for last 3 days with sorethroat  Deny fever, ha, runny nose, congestion, cough  Pt deny any other systemic sx  Prior to Admission Medications   Prescriptions Last Dose Informant Patient Reported? Taking? SUMAtriptan (IMITREX) 25 mg tablet   Yes No   Sig: Take 1 tablet by mouth every 2 (two) hours as needed   albuterol (PROVENTIL HFA,VENTOLIN HFA) 90 mcg/act inhaler  Self Yes No   Sig: Inhale 2 puffs every 4 (four) hours as needed for wheezing     albuterol (VENTOLIN HFA) 90 mcg/act inhaler   Yes No   Sig: Inhale   atorvastatin (LIPITOR) 20 mg tablet   No No   Sig: Take 1 tablet (20 mg total) by mouth daily   ciprofloxacin-dexamethasone (CIPRODEX) otic suspension   No No   Sig: Administer 4 drops into the left ear 2 (two) times a day for 7 days   cyclobenzaprine (FLEXERIL) 10 mg tablet   Yes No   Sig: Take 1 tablet by mouth   fluticasone (FLONASE) 50 mcg/act nasal spray   No No   Si spray into each nostril daily   loratadine (CLARITIN) 10 mg tablet   No No   Sig: take 1 tablet by mouth once daily   neomycin-polymyxin-hydrocortisone (CORTISPORIN) 1 % SOLN   Yes No   Sig: instill 4 drops  INTO EARS four times a day   omeprazole (PriLOSEC) 20 mg delayed release capsule   No No   Sig: Take 1 capsule (20 mg total) by mouth daily   pseudoephedrine (SUDAFED) 30 mg tablet   No No   Sig: Take 1 tablet (30 mg total) by mouth every 8 (eight) hours as needed for congestion      Facility-Administered Medications: None       Past Medical History:   Diagnosis Date    Anxiety     Asthma     Peterson's esophagus     Chest pain     Last assessed - 17    Chronic GERD     Last assessed - 3/11/16    [facility] obstructive [facility]     Hep C w/o coma, chronic (HCC)     treated    Hepatic disease     History of neck problems     Hypercholesterolemia     Hypertension     Kidney disease        Past Surgical History:   Procedure Laterality Date    APPENDECTOMY      ESOPHAGOGASTRODUODENOSCOPY      HERNIA REPAIR      Resolved     OR ESOPHAGOGASTRODUODENOSCOPY TRANSORAL DIAGNOSTIC N/A 11/30/2017    Procedure: ESOPHAGOGASTRODUODENOSCOPY (EGD); Surgeon: Amanda Mayer DO;  Location: [facility]; Service: Gastroenterology       Family History   Problem Relation Age of Onset    Hyperlipidemia Mother     Cancer Mother         Malignant Neoplasm     Heart attack Father     Lung cancer Maternal Grandmother     Lung cancer Maternal Grandfather     Heart attack Maternal Grandfather     No Known Problems Paternal Grandmother     No Known Problems Paternal Grandfather     Other Other         Cardiac Disorder    Stroke Other      I have reviewed and agree with the history as documented  Social History   Substance Use Topics    Smoking status: Current Every Day Smoker     Packs/day: 0 50     Types: Cigarettes    Smokeless tobacco: Current User      Comment: since 14 yrs old, Smokes less than 1 pack per day  per [employer]     Alcohol use No        Review of Systems   HENT: Positive for ear pain and sore throat  Negative for congestion, dental problem, drooling, ear discharge, facial swelling, hearing loss, mouth sores, nosebleeds, postnasal drip, rhinorrhea, sneezing, trouble swallowing and voice change  All other systems reviewed and are negative  Physical Exam  Physical Exam   Constitutional: No distress  HENT:   Head: Normocephalic and atraumatic  Right Ear: Hearing, tympanic membrane and ear canal normal    Left Ear: Hearing and ear canal normal  No lacerations  There is tenderness  No drainage or swelling  No foreign bodies  No mastoid tenderness  Tympanic membrane is injected and erythematous   Tympanic membrane is not perforated, not retracted and not bulging  A middle ear effusion (mild) is present  Ears:    Mouth/Throat: No oropharyngeal exudate  Blue [place] = myringotomy tube, blue clolor in place  With erythema without drainage, nor perforation   Eyes: Pupils are equal, round, and reactive to light  Right eye exhibits no discharge  Left eye exhibits no discharge  Neck: Normal range of motion  Neck supple  Pulmonary/Chest: Effort normal  No respiratory distress  Abdominal: He exhibits no distension  There is no guarding  Musculoskeletal: Normal range of motion  He exhibits no tenderness  Neurological: He is alert  Skin: Skin is warm  No rash noted  He is not diaphoretic  No erythema  No pallor  Psychiatric: He has a normal mood and affect  Nursing note and vitals reviewed        Vital Signs  ED Triage Vitals [09/01/18 1703]   Temperature Pulse Respirations Blood Pressure SpO2   98 9 °F (37 2 °C) 94 18 137/100 98 %      Temp Source Heart Rate Source Patient Position - Orthostatic VS BP Location FiO2 (%)   Tympanic Monitor Sitting Right arm --      Pain Score       4           Vitals:    09/01/18 1703   BP: 137/100   Pulse: 94   Patient Position - Orthostatic VS: Sitting       Visual Acuity      ED Medications  Medications - No data to display    Diagnostic Studies  Results Reviewed     None                 No orders to display              Procedures  Procedures       Phone Contacts  ED Phone Contact    ED Course                               MDM    CritCare Time    Disposition  Final diagnoses:   Otitis media   Sorethroat     Time reflects when diagnosis was documented in both MDM as applicable and the Disposition within this note     Time User Action Codes Description Comment    9/1/2018  5:28 PM Tim Chowdhury Add [H66 90] Otitis media     9/1/2018  5:28 PM Dayana Knox Add [J02 9] Sorethroat     9/1/2018  5:30 PM Dayana Knox Modify [H66 90] Otitis media     9/1/2018  5:30 PM Tim Chowdhury Modify [J02 9] Sorethroat       ED Disposition     ED Disposition Condition Comment    Discharge  Brenda Alvarado discharge to home/self care  Condition at discharge: Stable        Follow-up Information     Follow up With Specialties Details Why Contact Info Additional [address], DO Internal Medicine  If symptoms worsen [address] Emergency Department Emergency Medicine  If symptoms worsen, fever, [address]  [phone] [address] ED, [address], [place], [address] [facility] [place], [address]          Patient's Medications   Discharge Prescriptions    AMOXICILLIN-CLAVULANATE (AUGMENTIN) 875-125 MG PER TABLET    Take 1 tablet by mouth every 12 (twelve) hours for 7 days       Start Date: 9/1/2018  End Date: 9/8/2018       Order Dose: 1 tablet       Quantity: 14 tablet    Refills: 0    IBUPROFEN (MOTRIN) 800 MG TABLET    Take 1 tablet (800 mg total) by mouth 3 (three) times a day       Start Date: 9/1/2018  End Date: --       Order Dose: 800 mg       Quantity: 45 tablet    Refills: 0     No discharge procedures on file      ED Provider  Electronically Signed by           Myra Cespedes PA-C  09/01/18 2717

## 2018-09-01 NOTE — DISCHARGE INSTRUCTIONS
Otitis Media   WHAT YOU NEED TO KNOW:   Otitis media is an ear infection  DISCHARGE INSTRUCTIONS:   Medicines:  · Ibuprofen or acetaminophen  helps decrease your pain and fever  They are available without a doctor's order  Ask your healthcare provider which medicine is right for you  Ask how much to take and how often to take it  These medicines can cause stomach bleeding if not taken correctly  Ibuprofen can cause kidney damage  Do not take ibuprofen if you have kidney disease, an ulcer, or allergies to aspirin  Acetaminophen can cause liver damage  Do not drink alcohol if you take acetaminophen  · Ear drops  help treat your ear pain  · Antibiotics  help treat a bacterial infection that caused your ear infection  · Take your medicine as directed  Contact your healthcare provider if you think your medicine is not helping or if you have side effects  Tell him or her if you are allergic to any medicine  Keep a list of the medicines, vitamins, and herbs you take  Include the amounts, and when and why you take them  Bring the list or the pill bottles to follow-up visits  Carry your medicine list with you in case of an emergency  Heat or ice:   · Heat  may be used to decrease your pain  Place a warm, moist washcloth on your ear  Apply for 15 to 20 minutes, 3 to 4 times a day    · Ice  helps decrease swelling and pain  Use an ice pack or put crushed ice in a plastic bag  Cover the ice pack with a towel and place it on your ear for 15 to 20 minutes, 3 to 4 times a day for 2 days  Prevent otitis media:   · Wash your hands often  Use soap and water  Wash your hands after you use the bathroom, change a child's diapers, or sneeze  Wash your hands before you prepare or eat food  · Stay away from people who are ill  Some germs are easily and quickly spread through contact  Return to work or school: You may return to work or school when your fever is gone     Follow up with your healthcare provider as directed:  Write down your questions so you remember to ask them during your visits  Contact your healthcare provider if:   · Your ear pain gets worse or does not go away, even after treatment  · The outside of your ear is red or swollen  · You have vomiting or diarrhea  · You have fluid coming from your ear  · You have questions or concerns about your condition or care  Return to the emergency department if:   · You have a seizure  · You have a fever and a stiff neck  © 2017 [facility] Information is for End User's use only and may not be sold, redistributed or otherwise used for commercial purposes  All illustrations and images included in CareNotes® are the copyrighted property of A D A M [employer] Inc  or [employer]  The above information is an  only  It is not intended as medical advice for individual conditions or treatments  Talk to your doctor, nurse or pharmacist before following any medical regimen to see if it is safe and effective for you  Sore Throat, Ambulatory Care   GENERAL INFORMATION:   A sore throat  is often caused by a cold or flu virus  A sore throat may also be caused by bacteria such as strep  Other causes include smoking, a runny nose, allergies, or acid reflux  Seek immediate care for the following symptoms:   · Trouble breathing or swallowing because your throat is swollen or sore    · Drooling because it hurts too much to swallow    · A painful lump in your throat that does not go away after 5 days    · A fever higher than 102? F (39?C) or lasts longer than 3 days    · Confusion    · Blood in your throat or ear  Treatment for a sore throat  will depend on the cause how severe it is  A sore throat cause by a virus will go away on its own without treatment  You will need antibiotics if your sore throat is caused by bacteria   Your sore throat should start to feel better within 3 to 5 days for both viral and bacterial infections  Care for your sore throat:   · Gargle with salt water  Mix ¼ teaspoon salt in a glass of warm water and gargle  This may help reduce swelling in your throat  · Take ibuprofen or acetaminophen:  These medicines decrease pain and fever  They are available without a doctor's order  Ask your healthcare provider which medicine is best for you  Ask how much to take and how often to take it  · Drink more liquids  Cold or warm drinks may help soothe your sore throat  Drinking liquids can also help prevent dehydration  · Use a cool-steam humidifier  to help moisten the air in your room and reduce your throat pain  · Use lozenges, ice, soft foods, or popsicles  to soothe your throat  · Rest your throat as much as possible  Try not to use your voice  This may irritate your throat and worsen your symptoms  Follow up with your healthcare provider as directed:  Write down your questions so you remember to ask them during your visits  CARE AGREEMENT:   You have the right to help plan your care  Learn about your health condition and how it may be treated  Discuss treatment options with your caregivers to decide what care you want to receive  You always have the right to refuse treatment  The above information is an  only  It is not intended as medical advice for individual conditions or treatments  Talk to your doctor, nurse or pharmacist before following any medical regimen to see if it is safe and effective for you  © 2014 [address] is for End User's use only and may not be sold, redistributed or otherwise used for commercial purposes  All illustrations and images included in CareNotes® are the copyrighted property of [employer]  or [employer]

## 2018-10-19 DIAGNOSIS — J32.9 RHINOSINUSITIS: ICD-10-CM

## 2018-10-19 DIAGNOSIS — J31.0 RHINOSINUSITIS: ICD-10-CM

## 2018-10-19 RX ORDER — ALCOHOL 62 ML/100ML
LIQUID TOPICAL
Qty: 30 TABLET | Refills: 3 | Status: SHIPPED | OUTPATIENT
Start: 2018-10-19 | End: 2019-08-02 | Stop reason: SDUPTHER

## 2018-12-13 ENCOUNTER — HOSPITAL ENCOUNTER (EMERGENCY)
Facility: HOSPITAL | Age: 53
Discharge: LEFT BEFORE DISCHARGE | End: 2018-12-13
Attending: EMERGENCY MEDICINE
Payer: COMMERCIAL

## 2018-12-13 ENCOUNTER — APPOINTMENT (EMERGENCY)
Dept: RADIOLOGY | Facility: HOSPITAL | Age: 53
End: 2018-12-13
Payer: COMMERCIAL

## 2018-12-13 VITALS
HEART RATE: 76 BPM | BODY MASS INDEX: 28.25 KG/M2 | RESPIRATION RATE: 14 BRPM | WEIGHT: 220 LBS | OXYGEN SATURATION: 96 % | DIASTOLIC BLOOD PRESSURE: 102 MMHG | SYSTOLIC BLOOD PRESSURE: 156 MMHG | TEMPERATURE: 97.8 F

## 2018-12-13 DIAGNOSIS — M54.9 BACK PAIN: Primary | ICD-10-CM

## 2018-12-13 LAB
ALBUMIN SERPL BCP-MCNC: 3.7 G/DL (ref 3.5–5)
ALP SERPL-CCNC: 54 U/L (ref 46–116)
ALT SERPL W P-5'-P-CCNC: 20 U/L (ref 12–78)
ANION GAP SERPL CALCULATED.3IONS-SCNC: 4 MMOL/L (ref 4–13)
AST SERPL W P-5'-P-CCNC: 11 U/L (ref 5–45)
BASOPHILS # BLD AUTO: 0.04 THOUSANDS/ΜL (ref 0–0.1)
BASOPHILS NFR BLD AUTO: 1 % (ref 0–1)
BILIRUB SERPL-MCNC: 0.27 MG/DL (ref 0.2–1)
BILIRUB UR QL STRIP: NEGATIVE
BUN SERPL-MCNC: 16 MG/DL (ref 5–25)
CALCIUM SERPL-MCNC: 9.9 MG/DL (ref 8.3–10.1)
CHLORIDE SERPL-SCNC: 107 MMOL/L (ref 100–108)
CLARITY UR: CLEAR
CO2 SERPL-SCNC: 27 MMOL/L (ref 21–32)
COLOR UR: YELLOW
COLOR, POC: YELLOW
CREAT SERPL-MCNC: 1.01 MG/DL (ref 0.6–1.3)
EOSINOPHIL # BLD AUTO: 0.07 THOUSAND/ΜL (ref 0–0.61)
EOSINOPHIL NFR BLD AUTO: 2 % (ref 0–6)
ERYTHROCYTE [DISTWIDTH] IN BLOOD BY AUTOMATED COUNT: 13.6 % (ref 11.6–15.1)
GFR SERPL CREATININE-BSD FRML MDRD: 84 ML/MIN/1.73SQ M
GLUCOSE SERPL-MCNC: 100 MG/DL (ref 65–140)
GLUCOSE UR STRIP-MCNC: NEGATIVE MG/DL
HCT VFR BLD AUTO: 46.2 % (ref 36.5–49.3)
HGB BLD-MCNC: 15 G/DL (ref 12–17)
HGB UR QL STRIP.AUTO: NEGATIVE
IMM GRANULOCYTES # BLD AUTO: 0.01 THOUSAND/UL (ref 0–0.2)
IMM GRANULOCYTES NFR BLD AUTO: 0 % (ref 0–2)
KETONES UR STRIP-MCNC: NEGATIVE MG/DL
LEUKOCYTE ESTERASE UR QL STRIP: NEGATIVE
LYMPHOCYTES # BLD AUTO: 1.34 THOUSANDS/ΜL (ref 0.6–4.47)
LYMPHOCYTES NFR BLD AUTO: 31 % (ref 14–44)
MCH RBC QN AUTO: 31.2 PG (ref 26.8–34.3)
MCHC RBC AUTO-ENTMCNC: 32.5 G/DL (ref 31.4–37.4)
MCV RBC AUTO: 96 FL (ref 82–98)
MONOCYTES # BLD AUTO: 0.35 THOUSAND/ΜL (ref 0.17–1.22)
MONOCYTES NFR BLD AUTO: 8 % (ref 4–12)
NEUTROPHILS # BLD AUTO: 2.49 THOUSANDS/ΜL (ref 1.85–7.62)
NEUTS SEG NFR BLD AUTO: 58 % (ref 43–75)
NITRITE UR QL STRIP: NEGATIVE
NRBC BLD AUTO-RTO: 0 /100 WBCS
PH UR STRIP.AUTO: 6 [PH] (ref 4.5–8)
PLATELET # BLD AUTO: 113 THOUSANDS/UL (ref 149–390)
PMV BLD AUTO: 10.8 FL (ref 8.9–12.7)
POTASSIUM SERPL-SCNC: 4.2 MMOL/L (ref 3.5–5.3)
PROT SERPL-MCNC: 7.3 G/DL (ref 6.4–8.2)
PROT UR STRIP-MCNC: NEGATIVE MG/DL
RBC # BLD AUTO: 4.81 MILLION/UL (ref 3.88–5.62)
SODIUM SERPL-SCNC: 138 MMOL/L (ref 136–145)
SP GR UR STRIP.AUTO: 1.02 (ref 1–1.03)
UROBILINOGEN UR QL STRIP.AUTO: 0.2 E.U./DL
WBC # BLD AUTO: 4.3 THOUSAND/UL (ref 4.31–10.16)

## 2018-12-13 PROCEDURE — 81003 URINALYSIS AUTO W/O SCOPE: CPT

## 2018-12-13 PROCEDURE — 80053 COMPREHEN METABOLIC PANEL: CPT | Performed by: EMERGENCY MEDICINE

## 2018-12-13 PROCEDURE — 36415 COLL VENOUS BLD VENIPUNCTURE: CPT | Performed by: EMERGENCY MEDICINE

## 2018-12-13 PROCEDURE — 99284 EMERGENCY DEPT VISIT MOD MDM: CPT

## 2018-12-13 PROCEDURE — 96361 HYDRATE IV INFUSION ADD-ON: CPT

## 2018-12-13 PROCEDURE — 70030 X-RAY EYE FOR FOREIGN BODY: CPT

## 2018-12-13 PROCEDURE — 96374 THER/PROPH/DIAG INJ IV PUSH: CPT

## 2018-12-13 PROCEDURE — 74176 CT ABD & PELVIS W/O CONTRAST: CPT

## 2018-12-13 PROCEDURE — 85025 COMPLETE CBC W/AUTO DIFF WBC: CPT | Performed by: EMERGENCY MEDICINE

## 2018-12-13 RX ORDER — LIDOCAINE 50 MG/G
1 PATCH TOPICAL ONCE
Status: DISCONTINUED | OUTPATIENT
Start: 2018-12-13 | End: 2018-12-13 | Stop reason: HOSPADM

## 2018-12-13 RX ORDER — KETOROLAC TROMETHAMINE 30 MG/ML
15 INJECTION, SOLUTION INTRAMUSCULAR; INTRAVENOUS ONCE
Status: COMPLETED | OUTPATIENT
Start: 2018-12-13 | End: 2018-12-13

## 2018-12-13 RX ADMIN — KETOROLAC TROMETHAMINE 15 MG: 30 INJECTION, SOLUTION INTRAMUSCULAR at 13:00

## 2018-12-13 RX ADMIN — SODIUM CHLORIDE 1000 ML: 0.9 INJECTION, SOLUTION INTRAVENOUS at 12:23

## 2018-12-13 NOTE — ED NOTES
Post void residual shows < 10 ml urine, pt states he feels like his bladder is empty and does not have the urge to void more     Mack Eaton, TOI  12/13/18 5974

## 2018-12-13 NOTE — ED RE-EVALUATION NOTE
Pt became upset that he was waiting so long and he wanted to leave  Pt was explained the risks of leaving without getting his mri including cauda equina with permanent paralysis of his legs  He understood the risks and signed out xander Howard MD  12/13/18 3290

## 2018-12-13 NOTE — ED NOTES
EDT went into room with wheelchair to transport patient to MRI  Pt  Was getting dressed and stated "I'm leaving  I've been here for 8 hours already"  Dr Hannah Dallas notified and at bedside to talk with pt  IV removed and signed AMA paperwork       Salome Watkins RN  12/13/18 1791

## 2018-12-13 NOTE — ED PROVIDER NOTES
History  Chief Complaint   Patient presents with    Flank Pain     pt started with L sided back pain several days ago, last night started radiating into L flank  reports trouble urinating     Emergency Department Note- Sammie Chaudhry 46 y o  male MRN: [id number]    Unit/Bed#: ED 14 Encounter: [id number]        History of Present Illness  HPI:  Sammie Chaudhry is a 46 y o  male who presents with left-sided flank/back pain x3 days  Patient states that he awoke with discomfort after sleeping on the couch without any heat  He also notes that a few days ago he struck his back against a wall while losing his footing on a ladder  He denies falling or striking his head at that time  Pain is described as aching and does improve with movement  Pain radiates from his left paraspinal flank region into his left lower extremity  He denies any new numbness weakness or tingling  No weakness  Denies saddle anesthesia  Able to ambulate but with some discomfort, improves with time  No other falls or trauma  No fevers no chills  No nausea no vomiting  No abdominal discomfort  + tobacco, denies other substances  No recent surgeries  He denies dysuria but does note that he occasionally feels like he does not completely empty his bladder  Initially stated no change in bowel movements but after further questioning states he has been constipated as well  History of appendectomy      REVIEW OF SYSTEMS  Constitutional:  Denies fever or chills   Eyes:  Denies change in visual acuity   HENT:  Denies nasal congestion or sore throat   Respiratory:  Denies cough or shortness of breath   Cardiovascular:  Denies chest pain or edema   GI:  Denies abdominal pain, nausea, vomiting, bloody stools or diarrhea   :  Denies dysuria, increased frequency  Musculoskeletal:  Positive back pain, no LE pain  Integument:  Denies rash   Neurologic:  Denies headache, focal weakness or sensory changes   Endocrine:  Denies polyuria or polydipsia Lymphatic:  Denies swollen glands   Psychiatric:  Denies depression or anxiety     Historical Information  Past Medical History:  No date: Anxiety  No date: Asthma  No date: Peterson's esophagus  No date: Chest pain      Comment:  Last assessed - 7/31/17  No date: Chronic GERD      Comment:  Last assessed - 3/11/16  No date: COPD (chronic obstructive pulmonary disease) (HCC)  No date: Hep C w/o coma, chronic (HCC)      Comment:  treated  No date: Hepatic disease  No date: History of neck problems  No date: Hypercholesterolemia  No date: Hypertension  No date: Kidney disease  Past Surgical History:  No date: APPENDECTOMY  No date: ESOPHAGOGASTRODUODENOSCOPY  No date: HERNIA REPAIR      Comment:  Resolved   11/30/2017: [place] ESOPHAGOGASTRODUODENOSCOPY TRANSORAL DIAGNOSTIC; N/A      Comment:  Procedure: ESOPHAGOGASTRODUODENOSCOPY (EGD); Surgeon:                Alexi Fierro DO;  Location: [facility]; Service:                Gastroenterology  Social History  Alcohol use: No              Drug use:  No               Comment: Hx of use, states clean since 1/2014    Smoking status: Current Every Day Smoker                                                   Packs/day: 0 50      Years: 0 00         Types: Cigarettes  Smokeless tobacco: Current User                    Comment: since 14 yrs old, Smokes less than 1 pack per            day  per [employer]     Family History: Review of patient's family history indicates:  Problem: Hyperlipidemia      Relation: Mother       Age of Onset: (Not Specified)   Problem: Cancer      Relation: Mother       Age of Onset: (Not Specified)       Comment: Malignant Neoplasm   Problem: Heart attack      Relation: Father       Age of Onset: (Not Specified)   Problem: Lung cancer      Relation: Maternal Grandmother       Age of Onset: (Not Specified)   Problem: Lung cancer      Relation: Maternal Grandfather       Age of Onset: (Not Specified)   Problem: Heart attack      Relation: Maternal Grandfather       Age of Onset: (Not Specified)   Problem: No Known Problems      Relation: Paternal Grandmother       Age of Onset: (Not Specified)   Problem: No Known Problems      Relation: Paternal Grandfather       Age of Onset: (Not Specified)   Problem: Other      Relation: Other       Age of Onset: (Not Specified)       Comment: Cardiac Disorder  Problem: Stroke      Relation: Other       Age of Onset: (Not Specified)       Meds/Allergies  (Not in a hospital admission)     -- Morphine -- Swelling    Objective  Vitals: Blood pressure (!) 177/91, pulse 87, temperature 97 8 °F (36 6 °C), temperature source Tympanic, resp  rate 20, weight 99 8 kg (220 lb), SpO2 98 %  PHYSICAL EXAM  Constitutional:  Well developed, well nourished, no acute distress, non-toxic appearance   Eyes:  PERRL, conjunctiva normal   HENT:  Atraumatic, external ears normal, nose normal, oropharynx moist, no pharyngeal exudates  Neck- normal range of motion, no tenderness, supple   Respiratory:  No respiratory distress, normal breath sounds, no rales, no wheezing   Cardiovascular:  Normal rate, normal rhythm, no murmurs, no gallops, no rubs   GI:  Soft, nondistended, normal bowel sounds, nontender, no organomegaly, no palpable mass, no rebound, no guarding   :  Positive costovertebral angle/paraspinal  tenderness on L, + intact delvin-rectal sensation, pt with rectal tone   Musculoskeletal:  No edema, no midlinetenderness, no deformities  Back- no midline tenderness, no stepoff or deformity  Integument:  Well hydrated, no rash   Lymphatic:  No lymphadenopathy noted   Neurologic:  Alert & oriented x 3, CN 2-12 normal, normal motor function, normal sensory function, no focal deficits noted, 5/5 strength b/l LE, itnact sensation b/l LE, intact gait, intact heel-toe, intact pulses, capillary refill < 2 sec,   Psychiatric:  Speech and behavior appropriate     Lab Results: Lab Results: I have personally reviewed pertinent lab results  Imaging: I have personally reviewed pertinent reports  EKG, Pathology, and Other Studies: I have personally reviewed pertinent films in PACS      Assessment/Plan    ED Medical Decision Makin-year-old male with flank/paraspinal pain x3 days  Questionable left sided injury  Also with urinary frequency and hesitancy and constipation  Will obtain imaging  Will obtain pre and post void oa, basic blood work, pain control and re-evaluate  PT with rectal tone but discussed concern given his urinary and BM symptoms and MRI and risks and benefits reviewed with pt and wife  Pt agreeable  Discussed with radiology who approved study  Pt with previous MRI  On re-evaluations, continued to deny abd pain  Sxms improved but still present  Pt signed out with MRI pending  Prior to Admission Medications   Prescriptions Last Dose Informant Patient Reported? Taking? RA LORATADINE 10 MG tablet   No No   Sig: take 1 tablet by mouth once daily   SUMAtriptan (IMITREX) 25 mg tablet   Yes No   Sig: Take 1 tablet by mouth every 2 (two) hours as needed   albuterol (PROVENTIL HFA,VENTOLIN HFA) 90 mcg/act inhaler  Self Yes No   Sig: Inhale 2 puffs every 4 (four) hours as needed for wheezing     albuterol (VENTOLIN HFA) 90 mcg/act inhaler   Yes No   Sig: Inhale   atorvastatin (LIPITOR) 20 mg tablet   No No   Sig: Take 1 tablet (20 mg total) by mouth daily   ciprofloxacin-dexamethasone (CIPRODEX) otic suspension   No No   Sig: Administer 4 drops into the left ear 2 (two) times a day for 7 days   cyclobenzaprine (FLEXERIL) 10 mg tablet   Yes No   Sig: Take 1 tablet by mouth   fluticasone (FLONASE) 50 mcg/act nasal spray   No No   Si spray into each nostril daily   ibuprofen (MOTRIN) 800 mg tablet   No No   Sig: Take 1 tablet (800 mg total) by mouth 3 (three) times a day   neomycin-polymyxin-hydrocortisone (CORTISPORIN) 1 % SOLN   Yes No   Sig: instill 4 drops  INTO EARS four times a day   omeprazole (PriLOSEC) 20 mg delayed release capsule   No No   Sig: Take 1 capsule (20 mg total) by mouth daily   pseudoephedrine (SUDAFED) 30 mg tablet   No No   Sig: Take 1 tablet (30 mg total) by mouth every 8 (eight) hours as needed for congestion      Facility-Administered Medications: None       Past Medical History:   Diagnosis Date    Anxiety     Asthma     Peterson's esophagus     Chest pain     Last assessed - 7/31/17    Chronic GERD     Last assessed - 3/11/16    COPD (chronic obstructive pulmonary disease) (HCC)     Hep C w/o coma, chronic (HCC)     treated    Hepatic disease     History of neck problems     Hypercholesterolemia     Hypertension     Kidney disease        Past Surgical History:   Procedure Laterality Date    APPENDECTOMY      ESOPHAGOGASTRODUODENOSCOPY      HERNIA REPAIR      Resolved     [place] ESOPHAGOGASTRODUODENOSCOPY TRANSORAL DIAGNOSTIC N/A 11/30/2017    Procedure: ESOPHAGOGASTRODUODENOSCOPY (EGD); Surgeon: Andi Garcia DO;  Location: [facility]; Service: Gastroenterology       Family History   Problem Relation Age of Onset    Hyperlipidemia Mother     Cancer Mother         Malignant Neoplasm     Heart attack Father     Lung cancer Maternal Grandmother     Lung cancer Maternal Grandfather     Heart attack Maternal Grandfather     No Known Problems Paternal Grandmother     No Known Problems Paternal Grandfather     Other Other         Cardiac Disorder    Stroke Other      I have reviewed and agree with the history as documented      Social History   Substance Use Topics    Smoking status: Current Every Day Smoker     Packs/day: 0 50     Types: Cigarettes    Smokeless tobacco: Current User      Comment: since 14 yrs old, Smokes less than 1 pack per day  per [employer]     Alcohol use No        Review of Systems    Physical Exam  Physical Exam    Vital Signs  ED Triage Vitals [12/13/18 1036]   Temperature Pulse Respirations Blood Pressure SpO2   97 8 °F (36 6 °C) 87 20 (!) 177/91 98 % Temp Source Heart Rate Source Patient Position - Orthostatic VS BP Location FiO2 (%)   Tympanic Monitor Sitting Left arm --      Pain Score       6           Vitals:    12/13/18 1600 12/13/18 1700 12/13/18 1730 12/13/18 1830   BP: 161/93 (!) 171/94 140/79 (!) 156/102   Pulse: 68 84 76 76   Patient Position - Orthostatic VS:           Visual Acuity      ED Medications  Medications   ketorolac (TORADOL) injection 15 mg (15 mg Intravenous Given 12/13/18 1300)   sodium chloride 0 9 % bolus 1,000 mL (0 mL Intravenous Stopped 12/13/18 1323)       Diagnostic Studies  Results Reviewed     Procedure Component Value Units Date/Time    Comprehensive metabolic panel [[id number]] Collected:  12/13/18 1221    Lab Status:  Final result Specimen:  Blood from Arm, Right Updated:  12/13/18 1256     Sodium 138 mmol/L      Potassium 4 2 mmol/L      Chloride 107 mmol/L      CO2 27 mmol/L      ANION GAP 4 mmol/L      BUN 16 mg/dL      Creatinine 1 01 mg/dL      Glucose 100 mg/dL      Calcium 9 9 mg/dL      AST 11 U/L      ALT 20 U/L      Alkaline Phosphatase 54 U/L      Total Protein 7 3 g/dL      Albumin 3 7 g/dL      Total Bilirubin 0 27 mg/dL      eGFR 84 ml/min/1 73sq m     Narrative:         National Kidney Disease Education Program recommendations are as follows:  GFR calculation is accurate only with a steady state creatinine  Chronic Kidney disease less than 60 ml/min/1 73 sq  meters  Kidney failure less than 15 ml/min/1 73 sq  meters      CBC and differential [[id number]]  (Abnormal) Collected:  12/13/18 1221    Lab Status:  Final result Specimen:  Blood from Arm, Right Updated:  12/13/18 1243     WBC 4 30 (L) Thousand/uL      RBC 4 81 Million/uL      Hemoglobin 15 0 g/dL      Hematocrit 46 2 %      MCV 96 fL      MCH 31 2 pg      MCHC 32 5 g/dL      RDW 13 6 %      MPV 10 8 fL      Platelets 396 (L) Thousands/uL      nRBC 0 /100 WBCs      Neutrophils Relative 58 %      Immat GRANS % 0 %      Lymphocytes Relative 31 % Monocytes Relative 8 %      Eosinophils Relative 2 %      Basophils Relative 1 %      Neutrophils Absolute 2 49 Thousands/µL      Immature Grans Absolute 0 01 Thousand/uL      Lymphocytes Absolute 1 34 Thousands/µL      Monocytes Absolute 0 35 Thousand/µL      Eosinophils Absolute 0 07 Thousand/µL      Basophils Absolute 0 04 Thousands/µL     POCT urinalysis dipstick [[id number]]  (Normal) Resulted:  12/13/18 1232    Lab Status:  Final result Specimen:  Urine Updated:  12/13/18 1234     Color, UA yellow    ED Urine Macroscopic [[id number]] Collected:  12/13/18 1232    Lab Status:  Final result Specimen:  Urine Updated:  12/13/18 1232     Color, UA Yellow     Clarity, UA Clear     pH, UA 6 0     Leukocytes, UA Negative     Nitrite, UA Negative     Protein, UA Negative mg/dl      Glucose, UA Negative mg/dl      Ketones, UA Negative mg/dl      Urobilinogen, UA 0 2 E U /dl      Bilirubin, UA Negative     Blood, UA Negative     Specific Gravity, UA 1 020    Narrative:       CLINITEK RESULT                 XR orbits for foreign body   Final Result by Lazarus Robinson, MD (12/13 1716)      No radiopaque orbital foreign body  Workstation performed: [id number]         CT recon only lumbar spine   Final Result by Lord Elvin MD (12/13 1349)      No fracture or traumatic subluxation  Workstation performed: [id number]         CT abdomen pelvis wo contrast   Final Result by Lord Elvin MD (12/13 1341)      No acute intra-abdominal abnormality  Cirrhotic configuration of the liver  Workstation performed: [id number]                    Procedures  Procedures       Phone Contacts  ED Phone Contact    ED Course  ED Course as of Dec 16 1133   Thu Dec 13, 2018   1448 Pt with ongoing back discomfort, admits now to both urinary and BM abnormalities  Discussed with Dr Jc Perera for MRI, approved  Will order and discuss with MRI for scheduling from the ED   PT agreeable to plan    1621 MRI by 8pm per scheduling    1636 Pt resting comfortably  Awaiting study  [facility]  CritCare Time    Disposition  Final diagnoses:   Back pain     Time reflects when diagnosis was documented in both MDM as applicable and the Disposition within this note     Time User Action Codes Description Comment    12/13/2018  6:45 PM Lydia Knutson Add [M54 9] Back pain       ED Disposition     ED Disposition Condition Comment    AMA  Date: 12/13/2018  Patient: Linda Rodriguez  Admitted: 12/13/2018 10:41 AM  Attending Provider: Andreas Kolb MD    Linda Rodriguez or his authorized caregiver has made the decision for the patient to leave the emergency department against the advice of  his attending physician  He or his authorized caregiver has been informed and understands the inherent risks, including death, and paralysis  He or his authorized caregiver has decided to accept the responsibility for this decision  Linda Rodriguez and a ll necessary parties have been advised that he may return for further evaluation or treatment  His condition at time of discharge was stable    Linda Rodriguez had current vital signs as follows:  BP (!) 156/102   Pulse 76   Temp 97 8 °F (36 6 °C) (Tympan ic)   Resp 14   Wt 99 8 kg (220 lb)         Follow-up Information     Follow up With Specialties Details Why Contact Info Additional [address] Emergency Department Emergency Medicine  If symptoms worsen [address] [place] [address] ED, [address] [place] [address], [address]          Discharge Medication List as of 12/13/2018  6:45 PM      CONTINUE these medications which have NOT CHANGED    Details   !! albuterol (PROVENTIL HFA,VENTOLIN HFA) 90 mcg/act inhaler Inhale 2 puffs every 4 (four) hours as needed for wheezing , Historical Med      !! albuterol (VENTOLIN HFA) 90 mcg/act inhaler Inhale, Starting Mon 6/29/2015, Historical Med      atorvastatin (LIPITOR) 20 mg tablet Take 1 tablet (20 mg total) by mouth daily, Starting Fri 3/2/2018, Normal      ciprofloxacin-dexamethasone (CIPRODEX) otic suspension Administer 4 drops into the left ear 2 (two) times a day for 7 days, Starting Fri 2/23/2018, Until Fri 3/2/2018, Normal      cyclobenzaprine (FLEXERIL) 10 mg tablet Take 1 tablet by mouth, Starting Wed 12/27/2017, Historical Med      fluticasone (FLONASE) 50 mcg/act nasal spray 1 spray into each nostril daily, Starting Thu 7/12/2018, Normal      ibuprofen (MOTRIN) 800 mg tablet Take 1 tablet (800 mg total) by mouth 3 (three) times a day, Starting Sat 9/1/2018, Print      neomycin-polymyxin-hydrocortisone (CORTISPORIN) 1 % SOLN instill 4 drops  INTO EARS four times a day, Historical Med      omeprazole (PriLOSEC) 20 mg delayed release capsule Take 1 capsule (20 mg total) by mouth daily, Starting Thu 8/2/2018, Normal      pseudoephedrine (SUDAFED) 30 mg tablet Take 1 tablet (30 mg total) by mouth every 8 (eight) hours as needed for congestion, Starting Fri 2/23/2018, Normal      RA LORATADINE 10 MG tablet take 1 tablet by mouth once daily, Normal      SUMAtriptan (IMITREX) 25 mg tablet Take 1 tablet by mouth every 2 (two) hours as needed, Starting Wed 12/27/2017, Historical Med       !! - Potential duplicate medications found  Please discuss with provider  No discharge procedures on file      ED Provider  Electronically Signed by           Anaya Figueroa MD  12/16/18 0524

## 2018-12-13 NOTE — DISCHARGE INSTRUCTIONS
Acute Low Back Pain   WHAT YOU NEED TO KNOW:   Acute low back pain is sudden discomfort in your lower back area that lasts for up to 6 weeks  The discomfort makes it difficult to tolerate activity  DISCHARGE INSTRUCTIONS:   Return to the emergency department if:   · You have severe pain  · You have sudden stiffness and heaviness on both buttocks down to both legs  · You have numbness or weakness in one leg, or pain in both legs  · You have numbness in your genital area or across your lower back  · You cannot control your urine or bowel movements  Contact your healthcare provider if:   · You have a fever  · You have pain at night or when you rest     · Your pain does not get better with treatment  · You have pain that worsens when you cough or sneeze  · You suddenly feel something pop or snap in your back  · You have questions or concerns about your condition or care  Medicines: The following medicines may be ordered by your healthcare provider:  · Acetaminophen  decreases pain  It is available without a doctor's order  Ask how much to take and how often to take it  Follow directions  Acetaminophen can cause liver damage if not taken correctly  · NSAIDs  help decrease swelling and pain  This medicine is available with or without a doctor's order  NSAIDs can cause stomach bleeding or kidney problems in certain people  If you take blood thinner medicine, always ask your healthcare provider if NSAIDs are safe for you  Always read the medicine label and follow directions  · Prescription pain medicine  may be given  Ask your healthcare provider how to take this medicine safely  · Muscle relaxers  decrease pain by relaxing the muscles in your lower spine  · Take your medicine as directed  Contact your healthcare provider if you think your medicine is not helping or if you have side effects  Tell him of her if you are allergic to any medicine   Keep a list of the medicines, vitamins, and herbs you take  Include the amounts, and when and why you take them  Bring the list or the pill bottles to follow-up visits  Carry your medicine list with you in case of an emergency  Self-care:   · Stay active  as much as you can without causing more pain  Bed rest could make your back pain worse  Start with some light exercises such as walking  Avoid heavy lifting until your pain is gone  Ask for more information about the activities or exercises that are right for you  · Ice  helps decrease swelling, pain, and muscle spams  Put crushed ice in a plastic bag  Cover it with a towel  Place it on your lower back for 20 to 30 minutes every 2 hours  Do this for about 2 to 3 days after your pain starts, or as directed  · Heat  helps decrease pain and muscle spasms  Start to use heat after treatment with ice has stopped  Use a small towel dampened with warm water or a heating pad, or sit in a warm bath  Apply heat on the area for 20 to 30 minutes every 2 hours for as many days as directed  Alternate heat and ice  Prevent acute low back pain:   · Use proper body mechanics  ¨ Bend at the hips and knees when you  objects  Do not bend from the waist  Use your leg muscles as you lift the load  Do not use your back  Keep the object close to your chest as you lift it  Try not to twist or lift anything above your waist     ¨ Change your position often when you stand for long periods of time  Rest one foot on a small box or footrest, and then switch to the other foot often  ¨ Try not to sit for long periods of time  When you do, sit in a straight-backed chair with your feet flat on the floor  Never reach, pull, or push while you are sitting  · Do exercises that strengthen your back muscles  Warm up before you exercise  Ask your healthcare provider the best exercises for you  · Maintain a healthy weight  Ask your healthcare provider how much you should weigh   Ask him to help you create a weight loss plan if you are overweight  Follow up with your healthcare provider as directed:  Return for a follow-up visit if you still have pain after 1 to 3 weeks of treatment  You may need to visit an orthopedist if your back pain lasts more than 12 weeks  Write down your questions so you remember to ask them during your visits  © 2017 [employer]  Information is for End User's use only and may not be sold, redistributed or otherwise used for commercial purposes  All illustrations and images included in CareNotes® are the copyrighted property of [employer]  or [employer]  The above information is an  only  It is not intended as medical advice for individual conditions or treatments  Talk to your doctor, nurse or pharmacist before following any medical regimen to see if it is safe and effective for you

## 2018-12-21 DIAGNOSIS — J32.9 RHINOSINUSITIS: ICD-10-CM

## 2018-12-21 DIAGNOSIS — J31.0 RHINOSINUSITIS: ICD-10-CM

## 2018-12-21 RX ORDER — ATORVASTATIN CALCIUM 20 MG/1
TABLET, FILM COATED ORAL
Qty: 90 TABLET | Refills: 3 | Status: SHIPPED | OUTPATIENT
Start: 2018-12-21 | End: 2019-12-02 | Stop reason: SDUPTHER

## 2019-01-09 ENCOUNTER — OFFICE VISIT (OUTPATIENT)
Dept: INTERNAL MEDICINE CLINIC | Facility: CLINIC | Age: 54
End: 2019-01-09

## 2019-01-09 VITALS
SYSTOLIC BLOOD PRESSURE: 140 MMHG | DIASTOLIC BLOOD PRESSURE: 88 MMHG | WEIGHT: 194 LBS | BODY MASS INDEX: 24.9 KG/M2 | TEMPERATURE: 97.4 F | HEIGHT: 74 IN | HEART RATE: 88 BPM

## 2019-01-09 DIAGNOSIS — K70.30 ALCOHOLIC CIRRHOSIS OF LIVER WITHOUT ASCITES (HCC): ICD-10-CM

## 2019-01-09 DIAGNOSIS — M62.830 PARASPINAL MUSCLE SPASM: ICD-10-CM

## 2019-01-09 DIAGNOSIS — K22.70 BARRETT'S ESOPHAGUS WITHOUT DYSPLASIA: ICD-10-CM

## 2019-01-09 DIAGNOSIS — E78.5 DYSLIPIDEMIA: ICD-10-CM

## 2019-01-09 DIAGNOSIS — J02.9 SORETHROAT: ICD-10-CM

## 2019-01-09 DIAGNOSIS — L85.3 XEROSIS OF SKIN: ICD-10-CM

## 2019-01-09 DIAGNOSIS — I10 ESSENTIAL HYPERTENSION: ICD-10-CM

## 2019-01-09 DIAGNOSIS — Z12.11 SCREENING FOR COLON CANCER: Primary | ICD-10-CM

## 2019-01-09 PROCEDURE — 99213 OFFICE O/P EST LOW 20 MIN: CPT | Performed by: INTERNAL MEDICINE

## 2019-01-09 RX ORDER — IBUPROFEN 400 MG/1
400 TABLET ORAL EVERY 6 HOURS PRN
Qty: 60 TABLET | Refills: 0 | Status: SHIPPED | OUTPATIENT
Start: 2019-01-09 | End: 2019-11-15

## 2019-01-09 RX ORDER — PETROLATUM 0.61 G/G
CREAM TOPICAL AS NEEDED
Qty: 397 G | Refills: 0 | Status: SHIPPED | OUTPATIENT
Start: 2019-01-09 | End: 2019-11-15

## 2019-01-09 RX ORDER — BLOOD PRESSURE TEST KIT
KIT MISCELLANEOUS DAILY
Qty: 1 EACH | Refills: 0 | Status: SHIPPED | OUTPATIENT
Start: 2019-01-09 | End: 2020-05-29 | Stop reason: SDUPTHER

## 2019-01-09 RX ORDER — LISINOPRIL 10 MG/1
10 TABLET ORAL DAILY
Qty: 30 TABLET | Refills: 1 | Status: SHIPPED | OUTPATIENT
Start: 2019-01-09 | End: 2019-08-02 | Stop reason: SDUPTHER

## 2019-01-09 RX ORDER — CYCLOBENZAPRINE HCL 10 MG
10 TABLET ORAL 3 TIMES DAILY PRN
Qty: 90 TABLET | Refills: 0 | Status: SHIPPED | OUTPATIENT
Start: 2019-01-09 | End: 2019-11-15

## 2019-01-09 NOTE — PROGRESS NOTES
PHQ-9 Depression Screening    PHQ-9:    Frequency of the following problems over the past two weeks:       Little interest or pleasure in doing things:  0 - not at all  Feeling down, depressed, or hopeless:  0 - not at all  PHQ-2 Score:  0       INTERNAL MEDICINE FOLLOW-UP OFFICE VISIT  [address]  [address], [address]    NAME: Norbert Degree  AGE: 46 y o  SEX: male    DATE OF ENCOUNTER: 1/9/2019    Assessment and Plan     Problem List Items Addressed This Visit        Digestive    Peterson esophagus     Continue omeprazole 40 mg daily  Patient had EGD 11/30/17 showed small esophageal varices and again noted known short-segment Peterson's  Biopsies were taken at this time  Biopsies were done 2015 which showed no evidence of dysplasia  GI recommends repeat EGD in 2 years which would be 11/30/19         Cirrhosis of liver ([facility] )     Genotype 1A status post treatment with undetectable viral load in 2016  Less right upper quadrant for [facility]  screening was done 2016 with AFP 2017 was 4 2  Will refer for right upper quadrant ultrasound AFP for [facility]  screening         Relevant Orders    US liver    AFP tumor marker       Cardiovascular and Mediastinum    Hypertension     Will start low-dose lisinopril 10mg dialy  Will order BP cuff for home monitoring and have patient check blood pressure daily and record results  Patient will call the office in 1-2 weeks to discuss results  Will further titrate BP medications at that time  Will continue to monitor         Relevant Medications    lisinopril (ZESTRIL) 10 mg tablet    Blood Pressure KIT       Musculoskeletal and Integument    Xerosis of skin    Relevant Medications    Skin Protectants, Misc  (EUCERIN) cream       Other    Dyslipidemia     Lipid panel in 2016 with cholesterol 180, HDL 45,     Continue atorvastatin         Paraspinal muscle spasm    Relevant Medications    cyclobenzaprine (FLEXERIL) 10 mg tablet q 8 hours p r n     ibuprofen (MOTRIN) 400 mg tablet q 6 hours p r n  Other Visit Diagnoses     Screening for colon cancer    -  Primary    Relevant Orders    Occult Blood, Fecal Immunochemical    Sorethroat              Orders Placed This Encounter   Procedures    Occult Blood, Fecal Immunochemical    US liver    AFP tumor marker       - Counseling Documentation: patient was counseled regarding: diagnostic results, instructions for management, risk factor reductions, prognosis, patient and family education, impressions, risks and benefits of treatment options and importance of compliance with treatment  - Counseling Time: counseling time more than 50% of visit: 15 minutes  - Barriers to treatment: None  - Medication Side Effects: Adverse side effects of medications were reviewed with the patient/guardian today  - Self Referrals: No    Chief Complaint     Chief Complaint   Patient presents with    Physical Exam     routine        History of Present Illness     Mr Nela Ceja is a 58-year-old male with a past medical history of Peterson's esophagus, history of hepatitis-C status post treatment with development of cirrhosis without complications, he also has bilateral chronic serous otitis media and hyperlipidemia  Patient is here today in clinic for follow-up  Patient reports left lower back paraspinal pain  Patient states is worse in the morning getting up and out of bed  There is no radicular symptoms  The patient denies chest pain or shortness of breath  She has noted that he gets intermittent flushing sensation at times which he thinks is related to his blood pressure being elevated  He denies fevers, chills, nausea or vomiting    Patient is still smoking        The following portions of the patient's history were reviewed and updated as appropriate: allergies, current medications, past family history, past medical history, past social history, past surgical history and problem list     Review of Systems     Review of Systems    Active Problem List     Patient Active Problem List   Diagnosis    Bilateral chronic serous otitis media    Allergic rhinitis    Peterson esophagus    Cervical radiculopathy    Cervical spondylosis    Cervical stenosis of spinal canal    Chronic GERD    Cirrhosis of liver (HCC)    Dyslipidemia    Panic disorder    Paresthesia of upper extremity    Paraspinal muscle spasm    Hypertension    Xerosis of skin       Objective     /88   Pulse 88   Temp (!) 97 4 °F (36 3 °C)   Ht 6' 2" (1 88 m)   Wt 88 kg (194 lb 0 1 oz)   BMI 24 91 kg/m²     Physical Exam   Constitutional: He is oriented to person, place, and time  He appears well-developed and well-nourished  HENT:   Head: Normocephalic and atraumatic  Eyes: Pupils are equal, round, and reactive to light  Neck: Normal range of motion  No JVD present  Cardiovascular: Normal rate and regular rhythm  Exam reveals no gallop and no friction rub  No murmur heard  Pulmonary/Chest: Effort normal  No respiratory distress  He has no wheezes  He has no rales  Abdominal: Soft  Bowel sounds are normal  He exhibits no distension  There is no tenderness  Musculoskeletal: He exhibits tenderness (Left-sided paraspinal muscle hypertrophy and tenderness with palpation  No radicular signs or symptoms)  Neurological: He is alert and oriented to person, place, and time  No cranial nerve deficit  Skin: Skin is dry  Rash noted  There is erythema  Xerosis of bilateral upper and lower extremities noted   Vitals reviewed        Pertinent Laboratory/Diagnostic Studies:  CBC:   Lab Results   Component Value Date/Time    WBC 4 30 (L) 12/13/2018 12:21 PM    WBC 3 66 (L) 12/04/2015 09:04 AM    RBC 4 81 12/13/2018 12:21 PM    RBC 5 09 12/04/2015 09:04 AM    HGB 15 0 12/13/2018 12:21 PM    HGB 16 4 12/04/2015 09:04 AM    HCT 46 2 12/13/2018 12:21 PM    HCT 46 4 12/04/2015 09:04 AM    MCV 96 12/13/2018 12:21 PM    MCV 91 12/04/2015 09:04 AM    MCH 31 2 12/13/2018 12:21 PM    MCH 32 2 12/04/2015 09:04 AM    MCHC 32 5 12/13/2018 12:21 PM    MCHC 35 3 12/04/2015 09:04 AM    RDW 13 6 12/13/2018 12:21 PM    RDW 13 4 12/04/2015 09:04 AM    MPV 10 8 12/13/2018 12:21 PM    MPV 11 0 12/04/2015 09:04 AM     (L) 12/13/2018 12:21 PM    PLT 75 (L) 12/04/2015 09:04 AM    NRBC 0 12/13/2018 12:21 PM    NEUTOPHILPCT 58 12/13/2018 12:21 PM    NEUTOPHILPCT 58 12/04/2015 09:04 AM    LYMPHOPCT 31 12/13/2018 12:21 PM    LYMPHOPCT 32 12/04/2015 09:04 AM    MONOPCT 8 12/13/2018 12:21 PM    MONOPCT 7 12/04/2015 09:04 AM    EOSPCT 2 12/13/2018 12:21 PM    EOSPCT 2 12/04/2015 09:04 AM    BASOPCT 1 12/13/2018 12:21 PM    BASOPCT 1 12/04/2015 09:04 AM    NEUTROABS 2 49 12/13/2018 12:21 PM    NEUTROABS 2 12 12/04/2015 09:04 AM    LYMPHSABS 1 34 12/13/2018 12:21 PM    LYMPHSABS 1 17 12/04/2015 09:04 AM    MONOSABS 0 35 12/13/2018 12:21 PM    MONOSABS 0 26 12/04/2015 09:04 AM    EOSABS 0 07 12/13/2018 12:21 PM    EOSABS 0 07 12/04/2015 09:04 AM     Chemistry Profile:   Lab Results   Component Value Date/Time     12/04/2015 09:04 AM    K 4 2 12/13/2018 12:21 PM    K 3 8 12/04/2015 09:04 AM     12/13/2018 12:21 PM     (H) 12/04/2015 09:04 AM    CO2 27 12/13/2018 12:21 PM    CO2 19 (L) 05/21/2016 11:02 AM    ANIONGAP 9 12/04/2015 09:04 AM    BUN 16 12/13/2018 12:21 PM    BUN 17 12/04/2015 09:04 AM    CREATININE 1 01 12/13/2018 12:21 PM    CREATININE 0 95 12/04/2015 09:04 AM    GLUC 100 12/13/2018 12:21 PM    GLUF 90 08/28/2017 10:06 AM    GLUCOSE 105 05/21/2016 11:02 AM    GLUCOSE 115 12/04/2015 09:04 AM    CALCIUM 9 9 12/13/2018 12:21 PM    CALCIUM 9 0 12/04/2015 09:04 AM    MG 2 3 12/12/2014 10:57 AM    AST 11 12/13/2018 12:21 PM    AST 85 (H) 12/04/2015 09:04 AM    ALT 20 12/13/2018 12:21 PM     (H) 12/04/2015 09:04 AM    ALKPHOS 54 12/13/2018 12:21 PM    ALKPHOS 82 12/04/2015 09:04 AM    PROT 7 7 12/04/2015 09:04 AM    BILITOT 0 51 12/04/2015 09:04 AM    EGFR 84 12/13/2018 12:21 PM    EGFR 88 6 05/21/2016 11:02 AM     Coagulation Studies:   Lab Results   Component Value Date/Time    PROTIME 13 5 08/28/2017 10:06 AM    PROTIME 14 8 (H) 12/12/2014 10:57 AM    INR 1 03 08/28/2017 10:06 AM    INR 1 18 (H) 12/12/2014 10:57 AM    PTT 29 12/12/2014 10:57 AM     Cardiac Studies:   Lab Results   Component Value Date/Time    BNP 5 01/06/2014 07:06 PM    TROPONINI <0 04 01/07/2014 10:55 AM    POCTROP 0 00 12/12/2014 10:59 AM         Current Medications     Current Outpatient Prescriptions:     albuterol (PROVENTIL HFA,VENTOLIN HFA) 90 mcg/act inhaler, Inhale 2 puffs every 4 (four) hours as needed for wheezing , Disp: , Rfl:     atorvastatin (LIPITOR) 20 mg tablet, take 1 tablet by mouth once daily, Disp: 90 tablet, Rfl: 3    cyclobenzaprine (FLEXERIL) 10 mg tablet, Take 1 tablet (10 mg total) by mouth 3 (three) times a day as needed for muscle spasms, Disp: 90 tablet, Rfl: 0    fluticasone (FLONASE) 50 mcg/act nasal spray, 1 spray into each nostril daily, Disp: 16 g, Rfl: 3    omeprazole (PriLOSEC) 20 mg delayed release capsule, Take 1 capsule (20 mg total) by mouth daily, Disp: 30 capsule, Rfl: 3    pseudoephedrine (SUDAFED) 30 mg tablet, Take 1 tablet (30 mg total) by mouth every 8 (eight) hours as needed for congestion, Disp: 30 tablet, Rfl: 0    RA LORATADINE 10 MG tablet, take 1 tablet by mouth once daily, Disp: 30 tablet, Rfl: 3    albuterol (VENTOLIN HFA) 90 mcg/act inhaler, Inhale, Disp: , Rfl:     Blood Pressure KIT, by Does not apply route daily, Disp: 1 each, Rfl: 0    ciprofloxacin-dexamethasone (CIPRODEX) otic suspension, Administer 4 drops into the left ear 2 (two) times a day for 7 days, Disp: 3 mL, Rfl: 0    ibuprofen (MOTRIN) 400 mg tablet, Take 1 tablet (400 mg total) by mouth every 6 (six) hours as needed for mild pain or headaches, Disp: 60 tablet, Rfl: 0    lisinopril (ZESTRIL) 10 mg tablet, Take 1 tablet (10 mg total) by mouth daily, Disp: 30 tablet, Rfl: 1    neomycin-polymyxin-hydrocortisone (CORTISPORIN) 1 % SOLN, instill 4 drops  INTO EARS four times a day, Disp: , Rfl: 0    Skin Protectants, Misc   (EUCERIN) cream, Apply topically as needed for wound care, Disp: 397 g, Rfl: 0    SUMAtriptan (IMITREX) 25 mg tablet, Take 1 tablet by mouth every 2 (two) hours as needed, Disp: , Rfl:     Health Maintenance     Health Maintenance   Topic Date Due    Depression Screening PHQ  1965    CRC Screening: Colonoscopy  1965    DTaP,Tdap,and Td Vaccines (1 - Tdap) 01/09/2020 (Originally 2/4/1985)    Hepatitis C Screening  Completed    INFLUENZA VACCINE  Completed    Pneumococcal PPSV23 Medium Risk Adult  Completed     Immunization History   Administered Date(s) Administered    Influenza 09/03/2015, 08/14/2017, 09/20/2018    Influenza TIV (IM) 09/03/2015, 08/14/2017    Pneumococcal Polysaccharide PPV23 05/22/2015       Yulissa Alarcon Cardio  1/9/2019 8:40 AM

## 2019-01-09 NOTE — PROGRESS NOTES
INTERNAL MEDICINE FOLLOW-UP OFFICE VISIT  [facility]  [address], [address]    NAME: Christina Alvarez  AGE: 46 y o  SEX: male    DATE OF ENCOUNTER: 1/9/2019    Assessment and Plan     Problem List Items Addressed This Visit     None      Visit Diagnoses     Screening for colon cancer    -  Primary    Relevant Orders    Occult Blood, Fecal Immunochemical          Orders Placed This Encounter   Procedures    Occult Blood, Fecal Immunochemical       - Counseling Documentation: patient was counseled regarding: diagnostic results, instructions for management, risk factor reductions, prognosis, patient and family education, impressions, risks and benefits of treatment options and importance of compliance with treatment  - Counseling Time: counseling time more than 50% of visit: 15 minutes  - Barriers to treatment: Compliance  - Medication Side Effects: Adverse side effects of medications were reviewed with the patient/guardian today    - Self Referrals: No    Chief Complaint     Chief Complaint   Patient presents with    Physical Exam     routine        History of Present Illness     HPI    The following portions of the patient's history were reviewed and updated as appropriate: allergies, current medications, past family history, past medical history, past social history, past surgical history and problem list     Review of Systems     Review of Systems    Active Problem List     Patient Active Problem List   Diagnosis    Bilateral chronic serous otitis media    Allergic rhinitis    Peterson esophagus    Cervical radiculopathy    Cervical spondylosis    Cervical stenosis of spinal canal    Chronic GERD    Cirrhosis of liver (HCC)    Dyslipidemia    Panic disorder    Paresthesia of upper extremity       Objective     /88   Pulse 88   Temp (!) 97 4 °F (36 3 °C)   Ht 6' 2" (1 88 m)   Wt 88 kg (194 lb 0 1 oz)   BMI 24 91 kg/m²     Physical Exam    Pertinent Laboratory/Diagnostic Studies:  CBC:   Lab Results   Component Value Date/Time    WBC 4 30 (L) 12/13/2018 12:21 PM    WBC 3 66 (L) 12/04/2015 09:04 AM    RBC 4 81 12/13/2018 12:21 PM    RBC 5 09 12/04/2015 09:04 AM    HGB 15 0 12/13/2018 12:21 PM    HGB 16 4 12/04/2015 09:04 AM    HCT 46 2 12/13/2018 12:21 PM    HCT 46 4 12/04/2015 09:04 AM    MCV 96 12/13/2018 12:21 PM    MCV 91 12/04/2015 09:04 AM    MCH 31 2 12/13/2018 12:21 PM    MCH 32 2 12/04/2015 09:04 AM    MCHC 32 5 12/13/2018 12:21 PM    MCHC 35 3 12/04/2015 09:04 AM    RDW 13 6 12/13/2018 12:21 PM    RDW 13 4 12/04/2015 09:04 AM    MPV 10 8 12/13/2018 12:21 PM    MPV 11 0 12/04/2015 09:04 AM     (L) 12/13/2018 12:21 PM    PLT 75 (L) 12/04/2015 09:04 AM    NRBC 0 12/13/2018 12:21 PM    NEUTOPHILPCT 58 12/13/2018 12:21 PM    NEUTOPHILPCT 58 12/04/2015 09:04 AM    LYMPHOPCT 31 12/13/2018 12:21 PM    LYMPHOPCT 32 12/04/2015 09:04 AM    MONOPCT 8 12/13/2018 12:21 PM    MONOPCT 7 12/04/2015 09:04 AM    EOSPCT 2 12/13/2018 12:21 PM    EOSPCT 2 12/04/2015 09:04 AM    BASOPCT 1 12/13/2018 12:21 PM    BASOPCT 1 12/04/2015 09:04 AM    NEUTROABS 2 49 12/13/2018 12:21 PM    NEUTROABS 2 12 12/04/2015 09:04 AM    LYMPHSABS 1 34 12/13/2018 12:21 PM    LYMPHSABS 1 17 12/04/2015 09:04 AM    MONOSABS 0 35 12/13/2018 12:21 PM    MONOSABS 0 26 12/04/2015 09:04 AM    EOSABS 0 07 12/13/2018 12:21 PM    EOSABS 0 07 12/04/2015 09:04 AM     Chemistry Profile:   Lab Results   Component Value Date/Time     12/04/2015 09:04 AM    K 4 2 12/13/2018 12:21 PM    K 3 8 12/04/2015 09:04 AM     12/13/2018 12:21 PM     (H) 12/04/2015 09:04 AM    CO2 27 12/13/2018 12:21 PM    CO2 19 (L) 05/21/2016 11:02 AM    ANIONGAP 9 12/04/2015 09:04 AM    BUN 16 12/13/2018 12:21 PM    BUN 17 12/04/2015 09:04 AM    CREATININE 1 01 12/13/2018 12:21 PM    CREATININE 0 95 12/04/2015 09:04 AM    GLUC 100 12/13/2018 12:21 PM    GLUF 90 08/28/2017 10:06 AM    GLUCOSE 105 05/21/2016 11:02 AM    GLUCOSE 115 12/04/2015 09:04 AM    CALCIUM 9 9 12/13/2018 12:21 PM    CALCIUM 9 0 12/04/2015 09:04 AM    MG 2 3 12/12/2014 10:57 AM    AST 11 12/13/2018 12:21 PM    AST 85 (H) 12/04/2015 09:04 AM    ALT 20 12/13/2018 12:21 PM     (H) 12/04/2015 09:04 AM    ALKPHOS 54 12/13/2018 12:21 PM    ALKPHOS 82 12/04/2015 09:04 AM    PROT 7 7 12/04/2015 09:04 AM    BILITOT 0 51 12/04/2015 09:04 AM    EGFR 84 12/13/2018 12:21 PM    EGFR 88 6 05/21/2016 11:02 AM     Coagulation Studies:   Lab Results   Component Value Date/Time    PROTIME 13 5 08/28/2017 10:06 AM    PROTIME 14 8 (H) 12/12/2014 10:57 AM    INR 1 03 08/28/2017 10:06 AM    INR 1 18 (H) 12/12/2014 10:57 AM    PTT 29 12/12/2014 10:57 AM     Cardiac Studies:   Lab Results   Component Value Date/Time    BNP 5 01/06/2014 07:06 PM    TROPONINI <0 04 01/07/2014 10:55 AM    POCTROP 0 00 12/12/2014 10:59 AM         Current Medications     Current Outpatient Prescriptions:     albuterol (PROVENTIL HFA,VENTOLIN HFA) 90 mcg/act inhaler, Inhale 2 puffs every 4 (four) hours as needed for wheezing , Disp: , Rfl:     atorvastatin (LIPITOR) 20 mg tablet, take 1 tablet by mouth once daily, Disp: 90 tablet, Rfl: 3    cyclobenzaprine (FLEXERIL) 10 mg tablet, Take 1 tablet by mouth, Disp: , Rfl:     fluticasone (FLONASE) 50 mcg/act nasal spray, 1 spray into each nostril daily, Disp: 16 g, Rfl: 3    ibuprofen (MOTRIN) 800 mg tablet, Take 1 tablet (800 mg total) by mouth 3 (three) times a day, Disp: 45 tablet, Rfl: 0    omeprazole (PriLOSEC) 20 mg delayed release capsule, Take 1 capsule (20 mg total) by mouth daily, Disp: 30 capsule, Rfl: 3    pseudoephedrine (SUDAFED) 30 mg tablet, Take 1 tablet (30 mg total) by mouth every 8 (eight) hours as needed for congestion, Disp: 30 tablet, Rfl: 0    RA LORATADINE 10 MG tablet, take 1 tablet by mouth once daily, Disp: 30 tablet, Rfl: 3    albuterol (VENTOLIN HFA) 90 mcg/act inhaler, Inhale, Disp: , Rfl:   ciprofloxacin-dexamethasone (CIPRODEX) otic suspension, Administer 4 drops into the left ear 2 (two) times a day for 7 days, Disp: 3 mL, Rfl: 0    neomycin-polymyxin-hydrocortisone (CORTISPORIN) 1 % SOLN, instill 4 drops  INTO EARS four times a day, Disp: , Rfl: 0    SUMAtriptan (IMITREX) 25 mg tablet, Take 1 tablet by mouth every 2 (two) hours as needed, Disp: , Rfl:     Health Maintenance     Health Maintenance   Topic Date Due    Depression Screening PHQ  1965    CRC Screening: Colonoscopy  1965    DTaP,Tdap,and Td Vaccines (1 - Tdap) 01/09/2020 (Originally 2/4/1985)    Hepatitis C Screening  Completed    INFLUENZA VACCINE  Completed    Pneumococcal PPSV23 Medium Risk Adult  Completed     Immunization History   Administered Date(s) Administered    Influenza 09/03/2015, 08/14/2017, 09/20/2018    Influenza TIV (IM) 09/03/2015, 08/14/2017    Pneumococcal Polysaccharide PPV23 05/22/2015       Usman Johnson Cardio  1/9/2019 8:05 AM

## 2019-01-09 NOTE — ASSESSMENT & PLAN NOTE
Will start low-dose lisinopril 10mg sebastien  Will order BP cuff for home monitoring and have patient check blood pressure daily and record results  Patient will call the office in 1-2 weeks to discuss results  Will further titrate BP medications at that time    Will continue to monitor

## 2019-01-09 NOTE — ASSESSMENT & PLAN NOTE
Genotype 1A status post treatment with undetectable viral load in 2016  Less right upper quadrant for [facility] [address]  screening was done 2016 with AFP 2017 was 4 2    Will refer for right upper quadrant ultrasound AFP for HCC screening

## 2019-01-09 NOTE — ASSESSMENT & PLAN NOTE
Continue omeprazole 40 mg daily  Patient had EGD 11/30/17 showed small esophageal varices and again noted known short-segment Peterson's  Biopsies were taken at this time  Biopsies were done 2015 which showed no evidence of dysplasia    GI recommends repeat EGD in 2 years which would be 11/30/19

## 2019-02-15 DIAGNOSIS — K21.9 GASTROESOPHAGEAL REFLUX DISEASE WITHOUT ESOPHAGITIS: ICD-10-CM

## 2019-02-15 RX ORDER — OMEPRAZOLE 20 MG/1
CAPSULE, DELAYED RELEASE ORAL
Qty: 90 CAPSULE | Refills: 3 | Status: SHIPPED | OUTPATIENT
Start: 2019-02-15 | End: 2019-12-02

## 2019-02-28 ENCOUNTER — HOSPITAL ENCOUNTER (EMERGENCY)
Facility: HOSPITAL | Age: 54
Discharge: HOME | End: 2019-02-28
Attending: EMERGENCY MEDICINE
Payer: COMMERCIAL

## 2019-02-28 ENCOUNTER — APPOINTMENT (EMERGENCY)
Dept: RADIOLOGY | Facility: HOSPITAL | Age: 54
End: 2019-02-28
Payer: COMMERCIAL

## 2019-02-28 VITALS
WEIGHT: 196.8 LBS | BODY MASS INDEX: 25.26 KG/M2 | OXYGEN SATURATION: 100 % | HEART RATE: 90 BPM | HEIGHT: 74 IN | DIASTOLIC BLOOD PRESSURE: 88 MMHG | SYSTOLIC BLOOD PRESSURE: 178 MMHG | RESPIRATION RATE: 18 BRPM | TEMPERATURE: 97.8 F

## 2019-02-28 DIAGNOSIS — M25.512 ACUTE PAIN OF LEFT SHOULDER: Primary | ICD-10-CM

## 2019-02-28 DIAGNOSIS — M62.838 TRAPEZIUS MUSCLE SPASM: ICD-10-CM

## 2019-02-28 PROCEDURE — 99283 EMERGENCY DEPT VISIT LOW MDM: CPT

## 2019-02-28 PROCEDURE — 73030 X-RAY EXAM OF SHOULDER: CPT

## 2019-02-28 PROCEDURE — 96372 THER/PROPH/DIAG INJ SC/IM: CPT

## 2019-02-28 RX ORDER — LIDOCAINE 50 MG/G
1 PATCH TOPICAL DAILY
Qty: 30 PATCH | Refills: 0 | Status: SHIPPED | OUTPATIENT
Start: 2019-02-28 | End: 2019-03-01

## 2019-02-28 RX ORDER — METHOCARBAMOL 500 MG/1
500 TABLET, FILM COATED ORAL 2 TIMES DAILY
Qty: 20 TABLET | Refills: 0 | Status: SHIPPED | OUTPATIENT
Start: 2019-02-28 | End: 2019-11-15

## 2019-02-28 RX ORDER — NAPROXEN 500 MG/1
500 TABLET ORAL 2 TIMES DAILY WITH MEALS
Qty: 30 TABLET | Refills: 0 | Status: SHIPPED | OUTPATIENT
Start: 2019-02-28 | End: 2019-11-15

## 2019-02-28 RX ORDER — LIDOCAINE 50 MG/G
1 PATCH TOPICAL ONCE
Status: DISCONTINUED | OUTPATIENT
Start: 2019-02-28 | End: 2019-02-28 | Stop reason: HOSPADM

## 2019-02-28 RX ORDER — METHOCARBAMOL 500 MG/1
500 TABLET, FILM COATED ORAL ONCE
Status: COMPLETED | OUTPATIENT
Start: 2019-02-28 | End: 2019-02-28

## 2019-02-28 RX ORDER — KETOROLAC TROMETHAMINE 30 MG/ML
15 INJECTION, SOLUTION INTRAMUSCULAR; INTRAVENOUS ONCE
Status: COMPLETED | OUTPATIENT
Start: 2019-02-28 | End: 2019-02-28

## 2019-02-28 RX ADMIN — LIDOCAINE 1 PATCH: 50 PATCH CUTANEOUS at 10:21

## 2019-02-28 RX ADMIN — METHOCARBAMOL 500 MG: 500 TABLET, FILM COATED ORAL at 10:21

## 2019-02-28 RX ADMIN — KETOROLAC TROMETHAMINE 15 MG: 30 INJECTION, SOLUTION INTRAMUSCULAR at 10:21

## 2019-02-28 NOTE — ED PROVIDER NOTES
History  Chief Complaint   Patient presents with    Shoulder Pain     Pt c/o left shoulder pain, states he fell 2 months ago  53 y o  Male presents with c o  Left shoulder pain x 2 weeks, notes he slipped off a ladder grasping the rungs with hands before falling to ground, notes he also fell backwards off a porch landing on b/l elbows  Notes pain has been getting progressively worse, denies swelling, numbness, tingling, loss of strength or sensation  Notes pain is worse with lifting arms above head or trying to reach arm behind his back  Denies hitting head, LOC, HA, blurry vision, dizziness or syncope  Prior to Admission Medications   Prescriptions Last Dose Informant Patient Reported? Taking? Blood Pressure KIT   No No   Sig: by Does not apply route daily   RA LORATADINE 10 MG tablet   No No   Sig: take 1 tablet by mouth once daily   SUMAtriptan (IMITREX) 25 mg tablet   Yes No   Sig: Take 1 tablet by mouth every 2 (two) hours as needed   Skin Protectants, Misc  (EUCERIN) cream   No No   Sig: Apply topically as needed for wound care   albuterol (PROVENTIL HFA,VENTOLIN HFA) 90 mcg/act inhaler  Self Yes No   Sig: Inhale 2 puffs every 4 (four) hours as needed for wheezing     albuterol (VENTOLIN HFA) 90 mcg/act inhaler   Yes No   Sig: Inhale   atorvastatin (LIPITOR) 20 mg tablet   No No   Sig: take 1 tablet by mouth once daily   ciprofloxacin-dexamethasone (CIPRODEX) otic suspension   No No   Sig: Administer 4 drops into the left ear 2 (two) times a day for 7 days   cyclobenzaprine (FLEXERIL) 10 mg tablet   No No   Sig: Take 1 tablet (10 mg total) by mouth 3 (three) times a day as needed for muscle spasms   fluticasone (FLONASE) 50 mcg/act nasal spray   No No   Si spray into each nostril daily   ibuprofen (MOTRIN) 400 mg tablet   No No   Sig: Take 1 tablet (400 mg total) by mouth every 6 (six) hours as needed for mild pain or headaches   lisinopril (ZESTRIL) 10 mg tablet   No No   Sig: Take 1 tablet (10 mg total) by mouth daily   neomycin-polymyxin-hydrocortisone (CORTISPORIN) 1 % SOLN   Yes No   Sig: instill 4 drops  INTO EARS four times a day   omeprazole (PriLOSEC) 20 mg delayed release capsule   No No   Sig: take 1 capsule by mouth once daily   pseudoephedrine (SUDAFED) 30 mg tablet   No No   Sig: Take 1 tablet (30 mg total) by mouth every 8 (eight) hours as needed for congestion      Facility-Administered Medications: None       Past Medical History:   Diagnosis Date    Anxiety     Asthma     Peterson's esophagus     Chest pain     Last assessed - 7/31/17    Chronic GERD     Last assessed - 3/11/16    COPD (chronic obstructive pulmonary disease) (HCC)     Hep C w/o coma, chronic (HCC)     treated    Hepatic disease     History of neck problems     Hypercholesterolemia     Hypertension     Kidney disease     Kidney stones        Past Surgical History:   Procedure Laterality Date    APPENDECTOMY      ESOPHAGOGASTRODUODENOSCOPY      HERNIA REPAIR      Resolved     [place] ESOPHAGOGASTRODUODENOSCOPY TRANSORAL DIAGNOSTIC N/A 11/30/2017    Procedure: ESOPHAGOGASTRODUODENOSCOPY (EGD); Surgeon: Lieutenant Woodrow DO;  Location: [facility]; Service: Gastroenterology       Family History   Problem Relation Age of Onset    Hyperlipidemia Mother     Cancer Mother         Malignant Neoplasm     Heart attack Father     Lung cancer Maternal Grandmother     Lung cancer Maternal Grandfather     Heart attack Maternal Grandfather     No Known Problems Paternal Grandmother     No Known Problems Paternal Grandfather     Other Other         Cardiac Disorder    Stroke Other      I have reviewed and agree with the history as documented      Social History     Tobacco Use    Smoking status: Current Every Day Smoker     Packs/day: 0 50     Types: Cigarettes    Smokeless tobacco: Current User    Tobacco comment: since 14 yrs old, Smokes less than 1 pack per day  per [employer]    Substance Use Topics    Alcohol use: No    Drug use: No     Comment: Hx of use, states clean since 1/2014        Review of Systems   All other systems reviewed and are negative  Physical Exam  Physical Exam   Constitutional: He is oriented to person, place, and time  He appears well-developed and well-nourished  HENT:   Head: Normocephalic and atraumatic  Right Ear: External ear normal    Left Ear: External ear normal    Nose: Nose normal    Eyes: Conjunctivae and EOM are normal    Neck: Normal range of motion  Cardiovascular: Normal rate and regular rhythm  Pulmonary/Chest: Effort normal and breath sounds normal    Abdominal: Soft  Musculoskeletal: He exhibits tenderness  Left shoulder: He exhibits decreased range of motion, tenderness, pain and spasm  Arms:  Neurological: He is alert and oriented to person, place, and time  Skin: Skin is warm  Capillary refill takes less than 2 seconds  Psychiatric: He has a normal mood and affect  His behavior is normal  Judgment and thought content normal    Nursing note and vitals reviewed  Vital Signs  ED Triage Vitals [02/28/19 0937]   Temperature Pulse Respirations Blood Pressure SpO2   97 8 °F (36 6 °C) 90 18 (!) 178/88 100 %      Temp Source Heart Rate Source Patient Position - Orthostatic VS BP Location FiO2 (%)   Tympanic Monitor Sitting Right arm --      Pain Score       6           Vitals:    02/28/19 0937   BP: (!) 178/88   Pulse: 90   Patient Position - Orthostatic VS: Sitting       Visual Acuity      ED Medications  Medications   ketorolac (TORADOL) injection 15 mg (15 mg Intramuscular Given 2/28/19 1021)   methocarbamol (ROBAXIN) tablet 500 mg (500 mg Oral Given 2/28/19 1021)       Diagnostic Studies  Results Reviewed     None                 XR shoulder 2+ views LEFT   Final Result by Deanna Reyes MD (02/28 1316)      No acute osseous abnormality              Workstation performed: [id number]                    Procedures  Procedures       Phone Contacts  ED Phone Contact    ED Course                               MDM  Number of Diagnoses or Management Options  Acute pain of left shoulder: new and requires workup  Trapezius muscle spasm: new and requires workup     Amount and/or Complexity of Data Reviewed  Tests in the radiology section of CPT®: reviewed and ordered        Disposition  Final diagnoses:   Acute pain of left shoulder   Trapezius muscle spasm     Time reflects when diagnosis was documented in both MDM as applicable and the Disposition within this note     Time User Action Codes Description Comment    2/28/2019 11:07 AM Denys Abad [M25 512] Acute pain of left shoulder     2/28/2019 11:07 AM Denys Abad [B36 339] Trapezius muscle spasm       ED Disposition     ED Disposition Condition Date/Time Comment    Discharge Stable Thu Feb 28, 2019 11:07 AM Brittany Valenzuela discharge to home/self care              Follow-up Information     Follow up With Specialties Details Why Contact Info Additional Information    [address] [facility]   [address] [id number]  [phone] [address] [facility], [address] [place] [address], [place], [place], [address] [facility]    [phone]             Discharge Medication List as of 2/28/2019 11:09 AM      START taking these medications    Details   methocarbamol (ROBAXIN) 500 mg tablet Take 1 tablet (500 mg total) by mouth 2 (two) times a day, Starting Thu 2/28/2019, Normal      naproxen (NAPROSYN) 500 mg tablet Take 1 tablet (500 mg total) by mouth 2 (two) times a day with meals, Starting Th 2/28/2019, Normal      lidocaine (LIDODERM) 5 % Apply 1 patch topically daily Remove & Discard patch within 12 hours or as directed by MD, Starting Th 2/28/2019, Print         CONTINUE these medications which have NOT CHANGED    Details   !! albuterol (PROVENTIL HFA,VENTOLIN HFA) 90 mcg/act inhaler Inhale 2 puffs every 4 (four) hours as needed for wheezing , Historical Med      !! albuterol (VENTOLIN HFA) 90 mcg/act inhaler Inhale, Starting Mon 6/29/2015, Historical Med      atorvastatin (LIPITOR) 20 mg tablet take 1 tablet by mouth once daily, Normal      Blood Pressure KIT by Does not apply route daily, Starting Wed 1/9/2019, Normal      ciprofloxacin-dexamethasone (CIPRODEX) otic suspension Administer 4 drops into the left ear 2 (two) times a day for 7 days, Starting Fri 2/23/2018, Until Fri 3/2/2018, Normal      cyclobenzaprine (FLEXERIL) 10 mg tablet Take 1 tablet (10 mg total) by mouth 3 (three) times a day as needed for muscle spasms, Starting Wed 1/9/2019, Normal      fluticasone (FLONASE) 50 mcg/act nasal spray 1 spray into each nostril daily, Starting u 7/12/2018, Normal      ibuprofen (MOTRIN) 400 mg tablet Take 1 tablet (400 mg total) by mouth every 6 (six) hours as needed for mild pain or headaches, Starting Wed 1/9/2019, Normal      lisinopril (ZESTRIL) 10 mg tablet Take 1 tablet (10 mg total) by mouth daily, Starting Wed 1/9/2019, Normal      neomycin-polymyxin-hydrocortisone (CORTISPORIN) 1 % SOLN instill 4 drops  INTO EARS four times a day, Historical Med      omeprazole (PriLOSEC) 20 mg delayed release capsule take 1 capsule by mouth once daily, Normal      pseudoephedrine (SUDAFED) 30 mg tablet Take 1 tablet (30 mg total) by mouth every 8 (eight) hours as needed for congestion, Starting Fri 2/23/2018, Normal      RA LORATADINE 10 MG tablet take 1 tablet by mouth once daily, Normal      Skin Protectants, Misc  (EUCERIN) cream Apply topically as needed for wound care, Starting Wed 1/9/2019, Normal      SUMAtriptan (IMITREX) 25 mg tablet Take 1 tablet by mouth every 2 (two) hours as needed, Starting Wed 12/27/2017, Historical Med       !! - Potential duplicate medications found  Please discuss with provider  No discharge procedures on file      ED Provider  Electronically Signed by           Delcie Kayser Joanna Bell PA-C  03/14/19 1122

## 2019-03-01 ENCOUNTER — OFFICE VISIT (OUTPATIENT)
Dept: OBGYN CLINIC | Facility: OTHER | Age: 54
End: 2019-03-01
Payer: COMMERCIAL

## 2019-03-01 VITALS
HEIGHT: 74 IN | BODY MASS INDEX: 25.15 KG/M2 | DIASTOLIC BLOOD PRESSURE: 86 MMHG | HEART RATE: 88 BPM | SYSTOLIC BLOOD PRESSURE: 133 MMHG | WEIGHT: 196 LBS

## 2019-03-01 DIAGNOSIS — M19.012 PRIMARY OSTEOARTHRITIS OF LEFT SHOULDER: Primary | ICD-10-CM

## 2019-03-01 DIAGNOSIS — M25.512 LEFT SHOULDER PAIN, UNSPECIFIED CHRONICITY: ICD-10-CM

## 2019-03-01 DIAGNOSIS — M75.102 ROTATOR CUFF SYNDROME OF LEFT SHOULDER: ICD-10-CM

## 2019-03-01 PROCEDURE — 20610 DRAIN/INJ JOINT/BURSA W/O US: CPT | Performed by: ORTHOPAEDIC SURGERY

## 2019-03-01 PROCEDURE — 99203 OFFICE O/P NEW LOW 30 MIN: CPT | Performed by: ORTHOPAEDIC SURGERY

## 2019-03-01 RX ORDER — METHYLPREDNISOLONE ACETATE 40 MG/ML
1 INJECTION, SUSPENSION INTRA-ARTICULAR; INTRALESIONAL; INTRAMUSCULAR; SOFT TISSUE
Status: COMPLETED | OUTPATIENT
Start: 2019-03-01 | End: 2019-03-01

## 2019-03-01 RX ORDER — BUPIVACAINE HYDROCHLORIDE 2.5 MG/ML
4 INJECTION, SOLUTION INFILTRATION; PERINEURAL
Status: COMPLETED | OUTPATIENT
Start: 2019-03-01 | End: 2019-03-01

## 2019-03-01 RX ADMIN — METHYLPREDNISOLONE ACETATE 1 ML: 40 INJECTION, SUSPENSION INTRA-ARTICULAR; INTRALESIONAL; INTRAMUSCULAR; SOFT TISSUE at 08:57

## 2019-03-01 RX ADMIN — BUPIVACAINE HYDROCHLORIDE 4 ML: 2.5 INJECTION, SOLUTION INFILTRATION; PERINEURAL at 08:57

## 2019-03-01 NOTE — PROGRESS NOTES
Patient Name:  Sonny Fregoso  MRN:  [id number]    Assessment & Plan     Left shoulder pain, mild osteoarthritis, possible rotator cuff pathology    1  Patient was given cortisone injection today due to increasing pain  2  Instructed to ice and take Naproxen as prescribed  3  Due to weakness and painful arc of motion an MRI will be ordered to evaluate for possible internal derangement of the shoulder  4  He will follow up after MRI for discussion of results  Patient is traveling to [place] on March 13 and may call for MRI results if desired  Chief Complaint     Left Shoulder Pain      History of the Present Illness     Sonny Fregoso is a 53 y o  male presents today for a consultation from the emergency department for left shoulder pain  Patient states that he has been experiencing pain for about 2 months after falling backwards off of a ladder and landing on his back and b/l elbows  Patient reports that his symptoms have been increasing since this incident  He points to the anterior and posterior aspect of his shoulder as the main source of pain  His symptoms are worse with any motions of his shoulder  Denies numbness and tingling, fevers or chills  Physical Exam     /86   Pulse 88   Ht 6' 2" (1 88 m)   Wt 88 9 kg (196 lb)   BMI 25 16 kg/m²     Left  shoulder:  Deformity:  None  Tenderness to palpation:  None  Range of motion:   FF:  130 (150 R)   ER in abduction:  70 (80 R)   IR in abduction:  20 (50 R)  Ashley impingement sign:  Positive  Empty can test:  Positive  Speed's test:  Positive  Cross-body adduction test:  Positive  Strength   FF:  4/5   ER:  4/5   IR:  5-/5      Eyes:  Anicteric sclerae  Neck:  Supple  Lungs:  Unlabored breathing  Cardiovascular:  Capillary refill is less than 2 seconds  Skin:  Intact without erythema  Neurologic:  Sensation intact to light touch  Psychiatric:  Mood and affect are appropriate        Data Review     I have personally reviewed pertinent films in PACS, and my interpretation follows:    SARIKA Leatha Molina performed on 2/28/19: Mild glenohumeral joint and AC joint osteoarthritis    Large joint arthrocentesis: L glenohumeral  Date/Time: 3/1/2019 8:57 AM  Consent given by: patient  Site marked: site marked  Timeout: Immediately prior to procedure a time out was called to verify the correct patient, procedure, equipment, support staff and site/side marked as required   Supporting Documentation  Indications: pain and diagnostic evaluation   Procedure Details  Location: shoulder - L glenohumeral  Preparation: Patient was prepped and draped in the usual sterile fashion  Needle size: 22 G  Ultrasound guidance: no  Approach: anterolateral  Medications administered: 4 mL bupivacaine 0 25 %; 1 mL methylPREDNISolone acetate 40 mg/mL    Patient tolerance: patient tolerated the procedure well with no immediate complications  Dressing:  Sterile dressing applied          Past Medical History:   Diagnosis Date    Anxiety     Asthma     Peterson's esophagus     Chest pain     Last assessed - 7/31/17    Chronic GERD     Last assessed - 3/11/16    COPD [facility]chronic obstructive pulmonary disease) ([facility] [address] )     Hep C w/o coma, chronic ([facility] [address] )     treated    Hepatic disease     History of neck problems     Hypercholesterolemia     Hypertension     Kidney disease     Kidney stones        Past Surgical History:   Procedure Laterality Date    APPENDECTOMY      ESOPHAGOGASTRODUODENOSCOPY      HERNIA REPAIR      Resolved     [place] ESOPHAGOGASTRODUODENOSCOPY TRANSORAL DIAGNOSTIC N/A 11/30/2017    Procedure: ESOPHAGOGASTRODUODENOSCOPY (EGD); Surgeon: Yani Weathers DO;  Location: [facility]; Service: Gastroenterology       Allergies   Allergen Reactions    Morphine Swelling       Current Outpatient Medications on File Prior to Visit   Medication Sig Dispense Refill    albuterol (PROVENTIL HFA,VENTOLIN HFA) 90 mcg/act inhaler Inhale 2 puffs every 4 (four) hours as needed for wheezing   albuterol (VENTOLIN HFA) 90 mcg/act inhaler Inhale      atorvastatin (LIPITOR) 20 mg tablet take 1 tablet by mouth once daily 90 tablet 3    Blood Pressure KIT by Does not apply route daily 1 each 0    ciprofloxacin-dexamethasone (CIPRODEX) otic suspension Administer 4 drops into the left ear 2 (two) times a day for 7 days 3 mL 0    cyclobenzaprine (FLEXERIL) 10 mg tablet Take 1 tablet (10 mg total) by mouth 3 (three) times a day as needed for muscle spasms 90 tablet 0    fluticasone (FLONASE) 50 mcg/act nasal spray 1 spray into each nostril daily 16 g 3    ibuprofen (MOTRIN) 400 mg tablet Take 1 tablet (400 mg total) by mouth every 6 (six) hours as needed for mild pain or headaches 60 tablet 0    lidocaine (LIDODERM) 5 % Apply 1 patch topically daily Remove & Discard patch within 12 hours or as directed by MD 30 patch 0    lisinopril (ZESTRIL) 10 mg tablet Take 1 tablet (10 mg total) by mouth daily 30 tablet 1    methocarbamol (ROBAXIN) 500 mg tablet Take 1 tablet (500 mg total) by mouth 2 (two) times a day 20 tablet 0    naproxen (NAPROSYN) 500 mg tablet Take 1 tablet (500 mg total) by mouth 2 (two) times a day with meals 30 tablet 0    neomycin-polymyxin-hydrocortisone (CORTISPORIN) 1 % SOLN instill 4 drops  INTO EARS four times a day  0    omeprazole (PriLOSEC) 20 mg delayed release capsule take 1 capsule by mouth once daily 90 capsule 3    pseudoephedrine (SUDAFED) 30 mg tablet Take 1 tablet (30 mg total) by mouth every 8 (eight) hours as needed for congestion 30 tablet 0    RA LORATADINE 10 MG tablet take 1 tablet by mouth once daily 30 tablet 3    Skin Protectants, Misc   (EUCERIN) cream Apply topically as needed for wound care 397 g 0    SUMAtriptan (IMITREX) 25 mg tablet Take 1 tablet by mouth every 2 (two) hours as needed       Current Facility-Administered Medications on File Prior to Visit   Medication Dose Route Frequency Provider Last Rate Last Dose    [COMPLETED] ketorolac (TORADOL) injection 15 mg  15 mg Intramuscular Once Ray Spark, PA-C   15 mg at 02/28/19 1021    [COMPLETED] methocarbamol (ROBAXIN) tablet 500 mg  500 mg Oral Once Ray Spark, PA-C   500 mg at 02/28/19 1021    [DISCONTINUED] lidocaine (LIDODERM) 5 % patch 1 patch  1 patch Topical Once Ray Spark, PA-C   1 patch at 02/28/19 1021       Social History     Tobacco Use    Smoking status: Current Every Day Smoker     Packs/day: 0 50     Types: Cigarettes    Smokeless tobacco: Current User    Tobacco comment: since 14 yrs old, Smokes less than 1 pack per day  per [employer]    Substance Use Topics    Alcohol use: No    Drug use: No     Comment: Hx of use, states clean since 1/2014       Family History   Problem Relation Age of Onset    Hyperlipidemia Mother     Cancer Mother         Malignant Neoplasm     Heart attack Father     Lung cancer Maternal Grandmother     Lung cancer Maternal Grandfather     Heart attack Maternal Grandfather     No Known Problems Paternal Grandmother     No Known Problems Paternal Grandfather     Other Other         Cardiac Disorder    Stroke Other        Review of Systems     As stated in the HPI  All other systems were reviewed and are negative        Scribe Attestation    I,:    am acting as a scribe while in the presence of the attending physician :        I,:    personally performed the services described in this documentation    as scribed in my presence :

## 2019-03-04 ENCOUNTER — TRANSCRIBE ORDERS (OUTPATIENT)
Dept: OBGYN CLINIC | Facility: OTHER | Age: 54
End: 2019-03-04

## 2019-08-02 DIAGNOSIS — J32.9 RHINOSINUSITIS: ICD-10-CM

## 2019-08-02 DIAGNOSIS — J31.0 RHINOSINUSITIS: ICD-10-CM

## 2019-08-02 DIAGNOSIS — I10 ESSENTIAL HYPERTENSION: ICD-10-CM

## 2019-08-05 RX ORDER — ALCOHOL 62 ML/100ML
LIQUID TOPICAL
Qty: 30 TABLET | Refills: 3 | Status: SHIPPED | OUTPATIENT
Start: 2019-08-05 | End: 2020-05-11 | Stop reason: SDUPTHER

## 2019-08-05 RX ORDER — LISINOPRIL 10 MG/1
TABLET ORAL
Qty: 30 TABLET | Refills: 1 | Status: SHIPPED | OUTPATIENT
Start: 2019-08-05 | End: 2020-01-10 | Stop reason: SDUPTHER

## 2019-11-15 ENCOUNTER — HOSPITAL ENCOUNTER (EMERGENCY)
Facility: HOSPITAL | Age: 54
Discharge: HOME | End: 2019-11-15
Attending: EMERGENCY MEDICINE
Payer: COMMERCIAL

## 2019-11-15 VITALS
RESPIRATION RATE: 18 BRPM | WEIGHT: 213.85 LBS | SYSTOLIC BLOOD PRESSURE: 130 MMHG | HEART RATE: 66 BPM | DIASTOLIC BLOOD PRESSURE: 84 MMHG | TEMPERATURE: 97.4 F | OXYGEN SATURATION: 98 % | BODY MASS INDEX: 27.46 KG/M2

## 2019-11-15 DIAGNOSIS — I10 HYPERTENSION, UNSPECIFIED TYPE: ICD-10-CM

## 2019-11-15 DIAGNOSIS — M62.830 PARASPINAL MUSCLE SPASM: Primary | ICD-10-CM

## 2019-11-15 PROCEDURE — 99283 EMERGENCY DEPT VISIT LOW MDM: CPT

## 2019-11-15 PROCEDURE — 96374 THER/PROPH/DIAG INJ IV PUSH: CPT

## 2019-11-15 PROCEDURE — 99284 EMERGENCY DEPT VISIT MOD MDM: CPT | Performed by: EMERGENCY MEDICINE

## 2019-11-15 PROCEDURE — 96375 TX/PRO/DX INJ NEW DRUG ADDON: CPT

## 2019-11-15 RX ORDER — CYCLOBENZAPRINE HCL 10 MG
10 TABLET ORAL ONCE
Status: COMPLETED | OUTPATIENT
Start: 2019-11-15 | End: 2019-11-15

## 2019-11-15 RX ORDER — CYCLOBENZAPRINE HCL 10 MG
10 TABLET ORAL 2 TIMES DAILY PRN
Qty: 20 TABLET | Refills: 0 | Status: SHIPPED | OUTPATIENT
Start: 2019-11-15 | End: 2019-12-02 | Stop reason: SDUPTHER

## 2019-11-15 RX ORDER — KETOROLAC TROMETHAMINE 30 MG/ML
15 INJECTION, SOLUTION INTRAMUSCULAR; INTRAVENOUS ONCE
Status: COMPLETED | OUTPATIENT
Start: 2019-11-15 | End: 2019-11-15

## 2019-11-15 RX ORDER — NAPROXEN 500 MG/1
500 TABLET ORAL 2 TIMES DAILY WITH MEALS
Qty: 30 TABLET | Refills: 0 | Status: SHIPPED | OUTPATIENT
Start: 2019-11-15 | End: 2019-12-02 | Stop reason: SDUPTHER

## 2019-11-15 RX ORDER — ACETAMINOPHEN 325 MG/1
975 TABLET ORAL ONCE
Status: COMPLETED | OUTPATIENT
Start: 2019-11-15 | End: 2019-11-15

## 2019-11-15 RX ORDER — METOCLOPRAMIDE HYDROCHLORIDE 5 MG/ML
10 INJECTION INTRAMUSCULAR; INTRAVENOUS ONCE
Status: COMPLETED | OUTPATIENT
Start: 2019-11-15 | End: 2019-11-15

## 2019-11-15 RX ORDER — LIDOCAINE 50 MG/G
1 PATCH TOPICAL ONCE
Status: DISCONTINUED | OUTPATIENT
Start: 2019-11-15 | End: 2019-11-15 | Stop reason: HOSPADM

## 2019-11-15 RX ADMIN — LIDOCAINE 1 PATCH: 50 PATCH TOPICAL at 10:12

## 2019-11-15 RX ADMIN — METOCLOPRAMIDE 10 MG: 5 INJECTION, SOLUTION INTRAMUSCULAR; INTRAVENOUS at 09:38

## 2019-11-15 RX ADMIN — ACETAMINOPHEN 975 MG: 325 TABLET ORAL at 09:32

## 2019-11-15 RX ADMIN — CYCLOBENZAPRINE HYDROCHLORIDE 10 MG: 10 TABLET, FILM COATED ORAL at 09:31

## 2019-11-15 RX ADMIN — KETOROLAC TROMETHAMINE 15 MG: 30 INJECTION, SOLUTION INTRAMUSCULAR at 09:33

## 2019-11-15 NOTE — ED PROVIDER NOTES
History  Chief Complaint   Patient presents with    Headache     Pt has c/o HA for 4 days  Pt has hx of neck injury that causes them     48-year-old male with previous medical history of hypertension, cervical neck fusion with headache presenting to the emergency department with headache similar to previous headaches, not maximal in onset, no neurological deficits besides baseline numbness, tingling of upper extremities  Patient states onset of symptoms was 4 days ago with cervical neck pain  Patient usually takes Flexeril, however has ran out of his medication and was unable to take it  Patient started with cervical neck pain similar to previous cervical neck pain without trauma  Patient noted then headache emanating from the neck pain  No fevers, chills, nausea, vomiting, diarrhea  Patient has taken Tylenol for the pain without resolution of his symptoms  Headache   Pain location:  Occipital  Quality:  Dull  Radiates to: radiates from neck  Onset quality:  Gradual  Timing:  Constant  Progression:  Worsening  Chronicity:  Recurrent  Similar to prior headaches: yes    Context: not activity    Relieved by:  Nothing  Worsened by:  Nothing  Ineffective treatments:  Acetaminophen  Associated symptoms: neck pain        Prior to Admission Medications   Prescriptions Last Dose Informant Patient Reported? Taking? Blood Pressure KIT   No No   Sig: by Does not apply route daily   RA LORATADINE 10 MG tablet   No Yes   Sig: take 1 tablet by mouth once daily   albuterol (PROVENTIL HFA,VENTOLIN HFA) 90 mcg/act inhaler  Self Yes Yes   Sig: Inhale 2 puffs every 4 (four) hours as needed for wheezing     albuterol (VENTOLIN HFA) 90 mcg/act inhaler   Yes Yes   Sig: Inhale   atorvastatin (LIPITOR) 20 mg tablet   No Yes   Sig: take 1 tablet by mouth once daily   lisinopril (ZESTRIL) 10 mg tablet   No Yes   Sig: take 1 tablet by mouth once daily   omeprazole (PriLOSEC) 20 mg delayed release capsule   No Yes   Sig: take 1 capsule by mouth once daily      Facility-Administered Medications: None       Past Medical History:   Diagnosis Date    Anxiety     Asthma     Peterson's esophagus     Chest pain     Last assessed - 7/31/17    Chronic GERD     Last assessed - 3/11/16    COPD (chronic obstructive pulmonary disease) (HCC)     Hep C w/o coma, chronic (HCC)     treated    Hepatic disease     History of neck problems     Hypercholesterolemia     Hypertension     Kidney disease     Kidney stones        Past Surgical History:   Procedure Laterality Date    APPENDECTOMY      ESOPHAGOGASTRODUODENOSCOPY      HERNIA REPAIR      Resolved     [place] ESOPHAGOGASTRODUODENOSCOPY TRANSORAL DIAGNOSTIC N/A 11/30/2017    Procedure: ESOPHAGOGASTRODUODENOSCOPY (EGD); Surgeon: Ethel Burk DO;  Location: [facility]; Service: Gastroenterology       Family History   Problem Relation Age of Onset    Hyperlipidemia Mother     Cancer Mother         Malignant Neoplasm     Heart attack Father     Lung cancer Maternal Grandmother     Lung cancer Maternal Grandfather     Heart attack Maternal Grandfather     No Known Problems Paternal Grandmother     No Known Problems Paternal Grandfather     Other Other         Cardiac Disorder    Stroke Other      I have reviewed and agree with the history as documented  Social History     Tobacco Use    Smoking status: Current Every Day Smoker     Packs/day: 0 50     Types: Cigarettes    Smokeless tobacco: Current User    Tobacco comment: since 14 yrs old, Smokes less than 1 pack per day  per [employer]    Substance Use Topics    Alcohol use: No    Drug use: No     Comment: Hx of use, states clean since 1/2014        Review of Systems   Musculoskeletal: Positive for neck pain  Neurological: Positive for headaches  All other systems reviewed and are negative        Physical Exam  ED Triage Vitals [11/15/19 0906]   Temperature Pulse Respirations Blood Pressure SpO2   (!) 97 4 °F (36 3 °C) 71 18 (!) 193/88 98 %      Temp Source Heart Rate Source Patient Position - Orthostatic VS BP Location FiO2 (%)   Oral Monitor Sitting Right arm --      Pain Score       9             Orthostatic Vital Signs  Vitals:    11/15/19 0906 11/15/19 1034   BP: (!) 193/88 130/84   Pulse: 71 66   Patient Position - Orthostatic VS: Sitting        Physical Exam   Constitutional: He is oriented to person, place, and time  He appears well-developed and well-nourished  No distress  HENT:   Head: Normocephalic and atraumatic  Right Ear: External ear normal    Left Ear: External ear normal    Eyes: Conjunctivae and EOM are normal    Neck: No JVD present  No tracheal deviation present  Cardiovascular: Normal rate, regular rhythm, normal heart sounds and intact distal pulses  No murmur heard  Pulmonary/Chest: Breath sounds normal  No stridor  No respiratory distress  He has no wheezes  He has no rales  Abdominal: Soft  Bowel sounds are normal  He exhibits no distension and no mass  There is no tenderness  There is no rebound and no guarding  Genitourinary:   Genitourinary Comments: Deferred   Musculoskeletal: He exhibits no edema, tenderness or deformity  Neurological: He is alert and oriented to person, place, and time  No cranial nerve deficit or sensory deficit  He exhibits normal muscle tone  Coordination normal    Muscular strength 5/5 in the upper lower extremities bilaterally, sensation intact light touch in the upper lower extremities bilateral, cranial nerves 2-12 intact bilaterally, ambulates without difficulty  Negative point-to-point exam, negative pronator drift  Skin: Skin is warm and dry  Capillary refill takes less than 2 seconds  No rash noted  He is not diaphoretic  No erythema  No pallor  Psychiatric: He has a normal mood and affect  His behavior is normal  Judgment and thought content normal    Nursing note and vitals reviewed        ED Medications  Medications   cyclobenzaprine (FLEXERIL) tablet 10 mg (10 mg Oral Given 11/15/19 0931)   acetaminophen (TYLENOL) tablet 975 mg (975 mg Oral Given 11/15/19 0932)   ketorolac (TORADOL) injection 15 mg (15 mg Intravenous Given 11/15/19 0933)   metoclopramide (REGLAN) injection 10 mg (10 mg Intravenous Given 11/15/19 6013)       Diagnostic Studies  Results Reviewed     None                 No orders to display         Procedures  Procedures        ED Course                               MDM  Number of Diagnoses or Management Options  Hypertension, unspecified type: new and requires workup  Paraspinal muscle spasm: new and requires workup  Diagnosis management comments: 50-year-old male presenting emergency department with longstanding neck spasms that are now causing headaches similar to previous headaches  No signs of meningitis, no fevers, minimal suspicion for subarachnoid hemorrhage or other acute intracranial abnormality  Patient's headache will be treated with Tylenol, Toradol, Reglan  Patient's neck spasm will be treated with Flexeril and lidocaine patch  Patient will be prescribed Flexeril  Patient be referred to a new PCP  Patient has resolution of his symptoms    Patient has high blood pressure in the emergency department  Patient is not taking his blood pressure medication as he said he does not like the side effects  I spoke to the patient at length about getting more prompt follow-up for his blood pressure  Patient will expressed understanding that he should see a primary care doctor within the next week to recheck his blood pressure/ start a new blood pressure medication    Patient will come back to the emergency department if he has any neurological deficits  Patient expressed understanding with these return precautions         Amount and/or Complexity of Data Reviewed  Decide to obtain previous medical records or to obtain history from someone other than the patient: yes  Review and summarize past medical records: yes    Risk of Complications, Morbidity, and/or Mortality  Presenting problems: moderate  Diagnostic procedures: moderate  Management options: moderate    Patient Progress  Patient progress: resolved      Disposition  Final diagnoses:   Hypertension, unspecified type   Paraspinal muscle spasm     Time reflects when diagnosis was documented in both MDM as applicable and the Disposition within this note     Time User Action Codes Description Comment    11/15/2019  9:36 AM Chalmer Rome Add [I10] Hypertension, unspecified type     11/15/2019 10:30 AM Elke Child Add [K21 021] Paraspinal muscle spasm     11/15/2019 10:35 AM Nicolas Ascencio Modify [I10] Hypertension, unspecified type     11/15/2019 10:35 AM Chalmer Rome Modify [F64 333] Paraspinal muscle spasm       ED Disposition     ED Disposition Condition Date/Time Comment    Discharge Stable Fri Nov 15, 2019 10:35 AM Elana Alarcon discharge to home/self care              Follow-up Information     Follow up With Specialties Details Why Contact Info Additional Information    Infolink  Call  For re-evaluation as soon as possible [id number] [facility] Emergency Department Emergency Medicine  If symptoms worsen [address] [place] [address]  [phone]  ED, [address] [place] [address] 108          Discharge Medication List as of 11/15/2019 10:42 AM      START taking these medications    Details   cyclobenzaprine (FLEXERIL) 10 mg tablet Take 1 tablet (10 mg total) by mouth 2 (two) times a day as needed for muscle spasms, Starting Fri 11/15/2019, Normal      naproxen (NAPROSYN) 500 mg tablet Take 1 tablet (500 mg total) by mouth 2 (two) times a day with meals, Starting Fri 11/15/2019, Normal         CONTINUE these medications which have NOT CHANGED    Details   !! albuterol (PROVENTIL HFA,VENTOLIN HFA) 90 mcg/act inhaler Inhale 2 puffs every 4 (four) hours as needed for wheezing , Historical Med !! albuterol (VENTOLIN HFA) 90 mcg/act inhaler Inhale, Starting Mon 6/29/2015, Historical Med      atorvastatin (LIPITOR) 20 mg tablet take 1 tablet by mouth once daily, Normal      lisinopril (ZESTRIL) 10 mg tablet take 1 tablet by mouth once daily, Normal      omeprazole (PriLOSEC) 20 mg delayed release capsule take 1 capsule by mouth once daily, Normal      RA LORATADINE 10 MG tablet take 1 tablet by mouth once daily, Normal      Blood Pressure KIT by Does not apply route daily, Starting Wed 1/9/2019, Normal       !! - Potential duplicate medications found  Please discuss with provider  No discharge procedures on file  ED Provider  Attending physically available and evaluated Londonjamal Brewerkarlie ROWELL managed the patient along with the ED Attending      Electronically Signed by         Grey Leonardo DO  11/15/19 2888

## 2019-11-15 NOTE — DISCHARGE INSTRUCTIONS
Please have your blood pressure rechecked within the next week  Please call the number above to obtain a new primary care provider to assess you as soon as possible  Come back to the emergency department if you have any neurological deficits such as change in vision, hearing, numbness, tingling, weakness

## 2019-12-02 ENCOUNTER — OFFICE VISIT (OUTPATIENT)
Dept: INTERNAL MEDICINE CLINIC | Facility: CLINIC | Age: 54
End: 2019-12-02

## 2019-12-02 ENCOUNTER — TELEPHONE (OUTPATIENT)
Dept: INTERNAL MEDICINE CLINIC | Facility: CLINIC | Age: 54
End: 2019-12-02

## 2019-12-02 VITALS
HEIGHT: 74 IN | BODY MASS INDEX: 27.22 KG/M2 | SYSTOLIC BLOOD PRESSURE: 128 MMHG | HEART RATE: 64 BPM | WEIGHT: 212.08 LBS | TEMPERATURE: 97.6 F | DIASTOLIC BLOOD PRESSURE: 90 MMHG

## 2019-12-02 DIAGNOSIS — E78.5 DYSLIPIDEMIA: ICD-10-CM

## 2019-12-02 DIAGNOSIS — I10 ESSENTIAL HYPERTENSION: Primary | ICD-10-CM

## 2019-12-02 DIAGNOSIS — K22.70 BARRETT'S ESOPHAGUS WITHOUT DYSPLASIA: ICD-10-CM

## 2019-12-02 DIAGNOSIS — J32.9 RHINOSINUSITIS: ICD-10-CM

## 2019-12-02 DIAGNOSIS — K21.9 CHRONIC GERD: ICD-10-CM

## 2019-12-02 DIAGNOSIS — M48.02 CERVICAL STENOSIS OF SPINAL CANAL: ICD-10-CM

## 2019-12-02 DIAGNOSIS — J31.0 RHINOSINUSITIS: ICD-10-CM

## 2019-12-02 DIAGNOSIS — J30.2 SEASONAL ALLERGIC RHINITIS, UNSPECIFIED TRIGGER: ICD-10-CM

## 2019-12-02 DIAGNOSIS — H65.23 BILATERAL CHRONIC SEROUS OTITIS MEDIA: ICD-10-CM

## 2019-12-02 DIAGNOSIS — M54.12 CERVICAL RADICULOPATHY: ICD-10-CM

## 2019-12-02 DIAGNOSIS — M62.830 PARASPINAL MUSCLE SPASM: ICD-10-CM

## 2019-12-02 DIAGNOSIS — K74.69 OTHER CIRRHOSIS OF LIVER (HCC): ICD-10-CM

## 2019-12-02 PROCEDURE — 3008F BODY MASS INDEX DOCD: CPT | Performed by: INTERNAL MEDICINE

## 2019-12-02 PROCEDURE — 99213 OFFICE O/P EST LOW 20 MIN: CPT | Performed by: INTERNAL MEDICINE

## 2019-12-02 RX ORDER — NAPROXEN 500 MG/1
500 TABLET ORAL 2 TIMES DAILY WITH MEALS
Qty: 30 TABLET | Refills: 0 | Status: SHIPPED | OUTPATIENT
Start: 2019-12-02 | End: 2020-05-29 | Stop reason: SDUPTHER

## 2019-12-02 RX ORDER — LANSOPRAZOLE 15 MG/1
15 CAPSULE, DELAYED RELEASE ORAL DAILY
Qty: 90 CAPSULE | Refills: 3 | Status: SHIPPED | OUTPATIENT
Start: 2019-12-02 | End: 2021-02-02 | Stop reason: SDUPTHER

## 2019-12-02 RX ORDER — ATORVASTATIN CALCIUM 20 MG/1
20 TABLET, FILM COATED ORAL DAILY
Qty: 90 TABLET | Refills: 3 | Status: SHIPPED | OUTPATIENT
Start: 2019-12-02 | End: 2020-05-29 | Stop reason: SDUPTHER

## 2019-12-02 RX ORDER — ATORVASTATIN CALCIUM 20 MG/1
20 TABLET, FILM COATED ORAL DAILY
Qty: 90 TABLET | Refills: 3 | Status: CANCELLED | OUTPATIENT
Start: 2019-12-02

## 2019-12-02 RX ORDER — ALBUTEROL SULFATE 90 UG/1
2 AEROSOL, METERED RESPIRATORY (INHALATION) EVERY 4 HOURS PRN
Qty: 1 INHALER | Refills: 2 | Status: CANCELLED | OUTPATIENT
Start: 2019-12-02

## 2019-12-02 RX ORDER — CYCLOBENZAPRINE HCL 10 MG
10 TABLET ORAL 2 TIMES DAILY PRN
Qty: 20 TABLET | Refills: 0 | Status: SHIPPED | OUTPATIENT
Start: 2019-12-02 | End: 2020-05-29 | Stop reason: SDUPTHER

## 2019-12-02 NOTE — PATIENT INSTRUCTIONS
Gastritis   WHAT YOU NEED TO KNOW:   Gastritis is inflammation or irritation of the lining of your stomach  DISCHARGE INSTRUCTIONS:   Call 911 for any of the following:   · You develop chest pain or shortness of breath  Return to the emergency department if:   · You vomit blood  · You have black or bloody bowel movements  · You have severe stomach or back pain  Contact your healthcare provider if:   · You have a fever  · You have new or worsening symptoms, even after treatment  · You have questions or concerns about your condition or care  Medicines:   · Medicines  may be given to help treat a bacterial infection or decrease stomach acid  · Take your medicine as directed  Contact your healthcare provider if you think your medicine is not helping or if you have side effects  Tell him or her if you are allergic to any medicine  Keep a list of the medicines, vitamins, and herbs you take  Include the amounts, and when and why you take them  Bring the list or the pill bottles to follow-up visits  Carry your medicine list with you in case of an emergency  Manage or prevent gastritis:   · Do not smoke  Nicotine and other chemicals in cigarettes and cigars can make your symptoms worse and cause lung damage  Ask your healthcare provider for information if you currently smoke and need help to quit  E-cigarettes or smokeless tobacco still contain nicotine  Talk to your healthcare provider before you use these products  · Do not drink alcohol  Alcohol can prevent healing and make your gastritis worse  Talk to your healthcare provider if you need help to stop drinking  · Do not take NSAIDs or aspirin unless directed  These and similar medicines can cause irritation  If your healthcare provider says it is okay to take NSAIDs, take them with food  · Do not eat foods that cause irritation  Foods such as oranges and salsa can cause burning or pain  Eat a variety of healthy foods   Examples include fruits (not citrus), vegetables, low-fat dairy products, beans, whole-grain breads, and lean meats and fish  Try to eat small meals, and drink water with your meals  Do not eat for at least 3 hours before you go to bed  · Find ways to relax and decrease stress  Stress can increase stomach acid and make gastritis worse  Activities such as yoga, meditation, or listening to music can help you relax  Spend time with friends, or do things you enjoy  Follow up with your healthcare provider as directed: You may need ongoing tests or treatment, or referral to a gastroenterologist  Write down your questions so you remember to ask them during your visits  © 2017 [facility] [facility] Information is for End User's use only and may not be sold, redistributed or otherwise used for commercial purposes  All illustrations and images included in CareNotes® are the copyrighted property of A D A M , Inc  or [facility]  The above information is an  only  It is not intended as medical advice for individual conditions or treatments  Talk to your doctor, nurse or pharmacist before following any medical regimen to see if it is safe and effective for you

## 2019-12-02 NOTE — PROGRESS NOTES
[facility] Visit Note  Medardo Barros 53 y o  male   MRN: [id number]    Assessment and Plan      Essential hypertension:   Stable, continue lisinopril 10 mg q day    GERD with history Peterson's esophagus:   Last EGD 2 years ago, small varice noted, GI referral for follow-up endoscopic evaluation    -will change omeprazole to Prevacid 15 mg q day due to side effect of headache    History of cirrhosis:  Per patient has been treated for hepatitis C in past, completed treatment, history alcohol abuse, liver ultrasound ordered    Chronic Cervical Neck Pain:   Recent ER visit, given Flexeril/naproxen with improvement, will give refill Flexeril to take p r n , has followed up with Neurosurgery in past, no surgical intervention at this time, 5/5 muscle strength and sensation throughout    Dyslipidemia:   Continue atorvastatin 20 mg q day    Seasonal allergies:   Continue loratadine/fluticasone    Health Maintenance:   Flu shot at local pharmacy, tobacco abuse consult next visit    Schedule a follow-up appointment in 6 months or sooner if needed  Chief Complaint:   Routine follow-up  Subjective     History of Present Illness:    Patient presents to office today for routine follow-up  Patient notes no acute complaints  Recently seen in emergency room for headache most likely due to cervical neck pain, given Flexeril/naproxen  Other medical history reviewed, takes albuterol as needed for wheezing, seasonal allergies with loratadine/fluticasone  Omeprazole for chronic reflux with history of Peterson's esophagus  Has not had recent colonoscopy, per chart fit test negative 2 years ago  Takes lisinopril 10 mg at home compliantly, but feels this medication is giving him headaches  Compliant with Lipitor 20 mg  Review of Systems   Constitutional: Negative for chills, diaphoresis, fatigue and fever  HENT: Negative      Respiratory: Negative for cough, shortness of breath and wheezing  Cardiovascular: Negative for chest pain, palpitations and leg swelling  Gastrointestinal: Negative for abdominal distention, abdominal pain, constipation, diarrhea, nausea and vomiting  Genitourinary: Negative for difficulty urinating, enuresis and hematuria  Musculoskeletal: Positive for neck pain  Negative for back pain and gait problem  Skin: Negative for color change and pallor  Neurological: Positive for headaches  Negative for dizziness, facial asymmetry and light-headedness  Psychiatric/Behavioral: Negative for agitation, behavioral problems and confusion           Current Outpatient Medications:     albuterol (PROVENTIL HFA,VENTOLIN HFA) 90 mcg/act inhaler, Inhale 2 puffs every 4 (four) hours as needed for wheezing , Disp: , Rfl:     atorvastatin (LIPITOR) 20 mg tablet, take 1 tablet by mouth once daily, Disp: 90 tablet, Rfl: 3    Blood Pressure KIT, by Does not apply route daily, Disp: 1 each, Rfl: 0    cyclobenzaprine (FLEXERIL) 10 mg tablet, Take 1 tablet (10 mg total) by mouth 2 (two) times a day as needed for muscle spasms, Disp: 20 tablet, Rfl: 0    lisinopril (ZESTRIL) 10 mg tablet, take 1 tablet by mouth once daily, Disp: 30 tablet, Rfl: 1    naproxen (NAPROSYN) 500 mg tablet, Take 1 tablet (500 mg total) by mouth 2 (two) times a day with meals, Disp: 30 tablet, Rfl: 0    omeprazole (PriLOSEC) 20 mg delayed release capsule, take 1 capsule by mouth once daily, Disp: 90 capsule, Rfl: 3    RA LORATADINE 10 MG tablet, take 1 tablet by mouth once daily, Disp: 30 tablet, Rfl: 3  Past Medical History:   Diagnosis Date    Anxiety     Asthma     Peterson's esophagus     Chest pain     Last assessed - 7/31/17    Chronic GERD     Last assessed - 3/11/16    COPD (chronic obstructive pulmonary disease) (HCC)     Hep C w/o coma, chronic (HCC)     treated    Hepatic disease     History of neck problems     Hypercholesterolemia     Hypertension     Kidney disease     Kidney stones      Past Surgical History:   Procedure Laterality Date    APPENDECTOMY      ESOPHAGOGASTRODUODENOSCOPY      HERNIA REPAIR      Resolved     [place] ESOPHAGOGASTRODUODENOSCOPY TRANSORAL DIAGNOSTIC N/A 11/30/2017    Procedure: ESOPHAGOGASTRODUODENOSCOPY (EGD); Surgeon: Jaxson Lenz DO;  Location: [facility];   Service: Gastroenterology     Social History     Socioeconomic History    Marital status: Single     Spouse name: Not on file    Number of children: 3    Years of education: Not on file    Highest education level: Not on file   Occupational History    Occupation: Permanent Disability      Comment: [employer]-not looking for work    Social Needs    Financial resource strain: Not on file    Food insecurity:     Worry: Not on file     Inability: Not on file   Musement needs:     Medical: Not on file     Non-medical: Not on file   Tobacco Use    Smoking status: Current Every Day Smoker     Packs/day: 0 50     Types: Cigarettes    Smokeless tobacco: Current User    Tobacco comment: since 14 yrs old, Smokes less than 1 pack per day  per [employer]    Substance and Sexual Activity    Alcohol use: No    Drug use: No     Comment: Hx of use, states clean since 1/2014    Sexual activity: Not on file   Lifestyle    Physical activity:     Days per week: Not on file     Minutes per session: Not on file    Stress: Not on file   Relationships    Social connections:     Talks on phone: Not on file     Gets together: Not on file     Attends [employer] service: Not on file     Active member of club or organization: Not on file     Attends meetings of clubs or organizations: Not on file     Relationship status: Not on file    Intimate partner violence:     Fear of current or ex partner: Not on file     Emotionally abused: Not on file     Physically abused: Not on file     Forced sexual activity: Not on file   Other Topics Concern    Not on file   Social History Narrative    On permanent disability Unemployed, not looking for work          Family History   Problem Relation Age of Onset    Hyperlipidemia Mother     Cancer Mother         Malignant Neoplasm     Heart attack Father     Lung cancer Maternal Grandmother     Lung cancer Maternal Grandfather     Heart attack Maternal Grandfather     No Known Problems Paternal Grandmother     No Known Problems Paternal Grandfather     Other Other         Cardiac Disorder    Stroke Other      Allergies   Allergen Reactions    Morphine Swelling       Objective     Vitals:    12/02/19 1330   BP: 128/90   BP Location: Left arm   Patient Position: Sitting   Cuff Size: Adult   Pulse: 64   Temp: 97 6 °F (36 4 °C)   TempSrc: Oral   Weight: 96 2 kg (212 lb 1 3 oz)   Height: 6' 2" (1 88 m)       Physical exam:     GENERAL: NAD, pleasant   HEENT:  NC/AT, PERRL, EOMI, no scleral icterus, neck paraspinal hypertonicity, chronic neck pain  CARDIAC:  RRR, +S1/S2, no S3/S4 heard, no m/g/r  PULMONARY:  CTA B/L, no wheezing/rales/rhonci, non-labored breathing  ABDOMEN:  Soft, NT/ND,no rebound/guarding/rigidity  Extremities:  No edema, cyanosis, or clubbing, strength/sensation intact throughout  NEUROLOGIC: Grossly intact, No focal deficits  SKIN:  No rashes or erythema noted on exposed skin  Psych: Normal affect    ==  PLEASE NOTE:  This encounter was completed utilizing the [employer] Direct Speech Voice Recognition Software  Grammatical errors, random word insertions, pronoun errors and incomplete sentences are occasional consequences of the system due to software limitations, ambient noise and hardware issues  These may be missed by proof reading prior to affixing electronic signature  Any questions or concerns about the content, text or information contained within the body of this dictation should be directly addressed to the physician for clarification  Please do not hesitate to call me directly if you have any any questions or concerns      Giuliano Correia, DO [facility] Internal Medicine PGY-1    [address] [facility] , [address] [address] [facility] [address] [facility]  Office: [phone]  Fax: [phone]

## 2019-12-07 DIAGNOSIS — J31.0 RHINOSINUSITIS: ICD-10-CM

## 2019-12-07 DIAGNOSIS — J32.9 RHINOSINUSITIS: ICD-10-CM

## 2019-12-09 RX ORDER — FLUTICASONE PROPIONATE 50 MCG
SPRAY, SUSPENSION (ML) NASAL
Qty: 16 G | Refills: 0 | OUTPATIENT
Start: 2019-12-09

## 2019-12-12 DIAGNOSIS — J32.9 RHINOSINUSITIS: ICD-10-CM

## 2019-12-12 DIAGNOSIS — J31.0 RHINOSINUSITIS: ICD-10-CM

## 2019-12-12 RX ORDER — FLUTICASONE PROPIONATE 50 MCG
SPRAY, SUSPENSION (ML) NASAL
Qty: 16 G | Refills: 0 | OUTPATIENT
Start: 2019-12-12

## 2020-01-09 DIAGNOSIS — I10 ESSENTIAL HYPERTENSION: ICD-10-CM

## 2020-01-09 RX ORDER — LISINOPRIL 10 MG/1
10 TABLET ORAL DAILY
Qty: 30 TABLET | Refills: 1 | Status: CANCELLED | OUTPATIENT
Start: 2020-01-09

## 2020-01-10 DIAGNOSIS — I10 ESSENTIAL HYPERTENSION: ICD-10-CM

## 2020-01-10 RX ORDER — LISINOPRIL 10 MG/1
10 TABLET ORAL DAILY
Qty: 90 TABLET | Refills: 2 | Status: SHIPPED | OUTPATIENT
Start: 2020-01-10 | End: 2020-05-29 | Stop reason: SDUPTHER

## 2020-02-21 ENCOUNTER — HOSPITAL ENCOUNTER (EMERGENCY)
Facility: HOSPITAL | Age: 55
Discharge: HOME | End: 2020-02-21
Attending: EMERGENCY MEDICINE | Admitting: EMERGENCY MEDICINE
Payer: COMMERCIAL

## 2020-02-21 ENCOUNTER — APPOINTMENT (EMERGENCY)
Dept: RADIOLOGY | Facility: HOSPITAL | Age: 55
End: 2020-02-21
Payer: COMMERCIAL

## 2020-02-21 VITALS
HEIGHT: 74 IN | WEIGHT: 223.55 LBS | SYSTOLIC BLOOD PRESSURE: 188 MMHG | TEMPERATURE: 97.5 F | BODY MASS INDEX: 28.69 KG/M2 | HEART RATE: 79 BPM | DIASTOLIC BLOOD PRESSURE: 88 MMHG | OXYGEN SATURATION: 98 % | RESPIRATION RATE: 16 BRPM

## 2020-02-21 DIAGNOSIS — J40 BRONCHITIS: Primary | ICD-10-CM

## 2020-02-21 LAB
ALBUMIN SERPL BCP-MCNC: 3.3 G/DL (ref 3.5–5)
ALP SERPL-CCNC: 67 U/L (ref 46–116)
ALT SERPL W P-5'-P-CCNC: 31 U/L (ref 12–78)
ANION GAP SERPL CALCULATED.3IONS-SCNC: 7 MMOL/L (ref 4–13)
AST SERPL W P-5'-P-CCNC: 19 U/L (ref 5–45)
BACTERIA UR QL AUTO: ABNORMAL /HPF
BASOPHILS # BLD AUTO: 0.04 THOUSANDS/ΜL (ref 0–0.1)
BASOPHILS NFR BLD AUTO: 1 % (ref 0–1)
BILIRUB SERPL-MCNC: 0.4 MG/DL (ref 0.2–1)
BILIRUB UR QL STRIP: NEGATIVE
BUN SERPL-MCNC: 12 MG/DL (ref 5–25)
CALCIUM SERPL-MCNC: 9.2 MG/DL (ref 8.3–10.1)
CHLORIDE SERPL-SCNC: 107 MMOL/L (ref 100–108)
CLARITY UR: CLEAR
CO2 SERPL-SCNC: 28 MMOL/L (ref 21–32)
COLOR UR: YELLOW
CREAT SERPL-MCNC: 0.99 MG/DL (ref 0.6–1.3)
EOSINOPHIL # BLD AUTO: 0.09 THOUSAND/ΜL (ref 0–0.61)
EOSINOPHIL NFR BLD AUTO: 2 % (ref 0–6)
ERYTHROCYTE [DISTWIDTH] IN BLOOD BY AUTOMATED COUNT: 12.9 % (ref 11.6–15.1)
GFR SERPL CREATININE-BSD FRML MDRD: 85 ML/MIN/1.73SQ M
GLUCOSE SERPL-MCNC: 93 MG/DL (ref 65–140)
GLUCOSE UR STRIP-MCNC: NEGATIVE MG/DL
HCT VFR BLD AUTO: 49.1 % (ref 36.5–49.3)
HGB BLD-MCNC: 16.3 G/DL (ref 12–17)
HGB UR QL STRIP.AUTO: NEGATIVE
IMM GRANULOCYTES # BLD AUTO: 0.02 THOUSAND/UL (ref 0–0.2)
IMM GRANULOCYTES NFR BLD AUTO: 0 % (ref 0–2)
KETONES UR STRIP-MCNC: NEGATIVE MG/DL
LEUKOCYTE ESTERASE UR QL STRIP: ABNORMAL
LYMPHOCYTES # BLD AUTO: 1.29 THOUSANDS/ΜL (ref 0.6–4.47)
LYMPHOCYTES NFR BLD AUTO: 27 % (ref 14–44)
MCH RBC QN AUTO: 31.7 PG (ref 26.8–34.3)
MCHC RBC AUTO-ENTMCNC: 33.2 G/DL (ref 31.4–37.4)
MCV RBC AUTO: 96 FL (ref 82–98)
MONOCYTES # BLD AUTO: 0.41 THOUSAND/ΜL (ref 0.17–1.22)
MONOCYTES NFR BLD AUTO: 9 % (ref 4–12)
MUCOUS THREADS UR QL AUTO: ABNORMAL
NEUTROPHILS # BLD AUTO: 2.89 THOUSANDS/ΜL (ref 1.85–7.62)
NEUTS SEG NFR BLD AUTO: 61 % (ref 43–75)
NITRITE UR QL STRIP: NEGATIVE
NON-SQ EPI CELLS URNS QL MICRO: ABNORMAL /HPF
NRBC BLD AUTO-RTO: 0 /100 WBCS
OTHER STN SPEC: ABNORMAL
PH UR STRIP.AUTO: 7 [PH] (ref 4.5–8)
PLATELET # BLD AUTO: 115 THOUSANDS/UL (ref 149–390)
PMV BLD AUTO: 10.4 FL (ref 8.9–12.7)
POTASSIUM SERPL-SCNC: 4.4 MMOL/L (ref 3.5–5.3)
PROT SERPL-MCNC: 7.8 G/DL (ref 6.4–8.2)
PROT UR STRIP-MCNC: NEGATIVE MG/DL
RBC # BLD AUTO: 5.14 MILLION/UL (ref 3.88–5.62)
RBC #/AREA URNS AUTO: ABNORMAL /HPF
SODIUM SERPL-SCNC: 142 MMOL/L (ref 136–145)
SP GR UR STRIP.AUTO: 1.02 (ref 1–1.03)
UROBILINOGEN UR QL STRIP.AUTO: 0.2 E.U./DL
WBC # BLD AUTO: 4.74 THOUSAND/UL (ref 4.31–10.16)
WBC #/AREA URNS AUTO: ABNORMAL /HPF

## 2020-02-21 PROCEDURE — 81001 URINALYSIS AUTO W/SCOPE: CPT

## 2020-02-21 PROCEDURE — 99283 EMERGENCY DEPT VISIT LOW MDM: CPT | Performed by: PHYSICIAN ASSISTANT

## 2020-02-21 PROCEDURE — 80053 COMPREHEN METABOLIC PANEL: CPT | Performed by: PHYSICIAN ASSISTANT

## 2020-02-21 PROCEDURE — 85025 COMPLETE CBC W/AUTO DIFF WBC: CPT | Performed by: PHYSICIAN ASSISTANT

## 2020-02-21 PROCEDURE — 71046 X-RAY EXAM CHEST 2 VIEWS: CPT

## 2020-02-21 PROCEDURE — 87086 URINE CULTURE/COLONY COUNT: CPT | Performed by: PHYSICIAN ASSISTANT

## 2020-02-21 PROCEDURE — 36415 COLL VENOUS BLD VENIPUNCTURE: CPT | Performed by: PHYSICIAN ASSISTANT

## 2020-02-21 PROCEDURE — 99284 EMERGENCY DEPT VISIT MOD MDM: CPT

## 2020-02-21 PROCEDURE — 96360 HYDRATION IV INFUSION INIT: CPT

## 2020-02-21 RX ORDER — ALBUTEROL SULFATE 2.5 MG/3ML
2.5 SOLUTION RESPIRATORY (INHALATION) ONCE
Status: COMPLETED | OUTPATIENT
Start: 2020-02-21 | End: 2020-02-21

## 2020-02-21 RX ORDER — AZITHROMYCIN 250 MG/1
250 TABLET, FILM COATED ORAL DAILY
Qty: 4 TABLET | Refills: 0 | Status: SHIPPED | OUTPATIENT
Start: 2020-02-21 | End: 2020-02-25

## 2020-02-21 RX ORDER — PREDNISONE 20 MG/1
60 TABLET ORAL ONCE
Status: COMPLETED | OUTPATIENT
Start: 2020-02-21 | End: 2020-02-21

## 2020-02-21 RX ORDER — PREDNISONE 20 MG/1
40 TABLET ORAL DAILY
Qty: 8 TABLET | Refills: 0 | Status: SHIPPED | OUTPATIENT
Start: 2020-02-21 | End: 2020-02-25

## 2020-02-21 RX ORDER — AZITHROMYCIN 250 MG/1
500 TABLET, FILM COATED ORAL ONCE
Status: COMPLETED | OUTPATIENT
Start: 2020-02-21 | End: 2020-02-21

## 2020-02-21 RX ADMIN — PREDNISONE 60 MG: 20 TABLET ORAL at 11:35

## 2020-02-21 RX ADMIN — SODIUM CHLORIDE 1000 ML: 0.9 INJECTION, SOLUTION INTRAVENOUS at 10:52

## 2020-02-21 RX ADMIN — ALBUTEROL SULFATE 2.5 MG: 2.5 SOLUTION RESPIRATORY (INHALATION) at 10:42

## 2020-02-21 RX ADMIN — IPRATROPIUM BROMIDE 0.5 MG: 0.5 SOLUTION RESPIRATORY (INHALATION) at 10:42

## 2020-02-21 RX ADMIN — AZITHROMYCIN 500 MG: 250 TABLET, FILM COATED ORAL at 11:35

## 2020-02-21 NOTE — ED PROVIDER NOTES
History  Chief Complaint   Patient presents with    Cough     Pt presents to the ED with reports of fatigue, cough, congestion x 1 month  58-year-old male presents to the emergency department with complaints respiratory symptoms  States that he started with flu-like symptoms 1 month ago including cough, congestion, muscle aches, and fatigue  States that he also had chills that time with questionable fevers  Notes that most of his symptoms have improved except for his cough and congestion  States that cough has been persistent feels short of breath when lying down  History of COPD  States that only uses his albuterol inhaler when necessary  Does not feel like he has needed to use it recently  Denies associated chest pain  History provided by:  Patient   used: No        Prior to Admission Medications   Prescriptions Last Dose Informant Patient Reported? Taking?    Blood Pressure KIT   No Yes   Sig: by Does not apply route daily   RA LORATADINE 10 MG tablet   No Yes   Sig: take 1 tablet by mouth once daily   VENTOLIN  (90 Base) MCG/ACT inhaler   No Yes   Sig: INHALE 1-2 PUFFS EVERY 4-6 HOURS AS NEEDED FOR SHORTNESS OF BREATH AND/OR WHEEZING   atorvastatin (LIPITOR) 20 mg tablet   No Yes   Sig: Take 1 tablet (20 mg total) by mouth daily   cyclobenzaprine (FLEXERIL) 10 mg tablet   No Yes   Sig: Take 1 tablet (10 mg total) by mouth 2 (two) times a day as needed for muscle spasms   lansoprazole (PREVACID) 15 mg capsule   No Yes   Sig: Take 1 capsule (15 mg total) by mouth daily   lisinopril (ZESTRIL) 10 mg tablet   No Yes   Sig: Take 1 tablet (10 mg total) by mouth daily   naproxen (NAPROSYN) 500 mg tablet   No Yes   Sig: Take 1 tablet (500 mg total) by mouth 2 (two) times a day with meals      Facility-Administered Medications: None       Past Medical History:   Diagnosis Date    Anxiety     Asthma     Peterson's esophagus     Chest pain     Last assessed - 7/31/17    Chronic GERD     Last assessed - 3/11/16    COPD (chronic obstructive pulmonary disease) (HCC)     Hep C w/o coma, chronic (HCC)     treated    Hepatic disease     History of neck problems     Hypercholesterolemia     Hypertension     Kidney disease     Kidney stones        Past Surgical History:   Procedure Laterality Date    APPENDECTOMY      ESOPHAGOGASTRODUODENOSCOPY      HERNIA REPAIR      Resolved     ND ESOPHAGOGASTRODUODENOSCOPY TRANSORAL DIAGNOSTIC N/A 11/30/2017    Procedure: ESOPHAGOGASTRODUODENOSCOPY (EGD); Surgeon: Frankey Spillers, DO;  Location: [facility]; Service: Gastroenterology       Family History   Problem Relation Age of Onset    Hyperlipidemia Mother     Cancer Mother         Malignant Neoplasm     Heart attack Father     Lung cancer Maternal Grandmother     Lung cancer Maternal Grandfather     Heart attack Maternal Grandfather     No Known Problems Paternal Grandmother     No Known Problems Paternal Grandfather     Other Other         Cardiac Disorder    Stroke Other      I have reviewed and agree with the history as documented  Social History     Tobacco Use    Smoking status: Current Every Day Smoker     Packs/day: 0 50     Types: Cigarettes    Smokeless tobacco: Current User    Tobacco comment: since 14 yrs old, Smokes less than 1 pack per day  per [employer]    Substance Use Topics    Alcohol use: No    Drug use: No     Comment: Hx of use, states clean since 1/2014       Review of Systems   Constitutional: Negative for chills, fatigue and fever  HENT: Positive for congestion  Negative for ear pain, rhinorrhea, sneezing and sore throat  Respiratory: Positive for cough and shortness of breath  Negative for wheezing  Cardiovascular: Negative for chest pain  Musculoskeletal: Negative for arthralgias, back pain, myalgias and neck pain  Skin: Negative for rash and wound  Neurological: Negative for headaches     All other systems reviewed and are negative  Physical Exam  Physical Exam   Constitutional: He is oriented to person, place, and time  Vital signs are normal  He appears well-developed and well-nourished  HENT:   Head: Normocephalic and atraumatic  Right Ear: Hearing, tympanic membrane, external ear and ear canal normal    Left Ear: Hearing, tympanic membrane, external ear and ear canal normal    Ears:    Nose: Nose normal    Mouth/Throat: Uvula is midline and oropharynx is clear and moist    Eyes: Conjunctivae and EOM are normal  Right eye exhibits no discharge  Left eye exhibits no discharge  Neck: Normal range of motion  Cardiovascular: Normal rate and regular rhythm  Pulmonary/Chest: Effort normal and breath sounds normal  No respiratory distress  He has no wheezes  He has no rhonchi  He has no rales  Neurological: He is alert and oriented to person, place, and time  Skin: Skin is warm and dry  Psychiatric: He has a normal mood and affect  His behavior is normal    Nursing note and vitals reviewed        Vital Signs  ED Triage Vitals [02/21/20 1023]   Temperature Pulse Respirations Blood Pressure SpO2   97 5 °F (36 4 °C) 79 16 (!) 188/88 98 %      Temp Source Heart Rate Source Patient Position - Orthostatic VS BP Location FiO2 (%)   Oral Monitor Sitting Right arm --      Pain Score       2           Vitals:    02/21/20 1023   BP: (!) 188/88   Pulse: 79   Patient Position - Orthostatic VS: Sitting         Visual Acuity      ED Medications  Medications   sodium chloride 0 9 % bolus 1,000 mL (1,000 mL Intravenous New Bag 2/21/20 1052)   predniSONE tablet 60 mg (has no administration in time range)   azithromycin (ZITHROMAX) tablet 500 mg (has no administration in time range)   albuterol inhalation solution 2 5 mg (2 5 mg Nebulization Given 2/21/20 1042)   ipratropium (ATROVENT) 0 02 % inhalation solution 0 5 mg (0 5 mg Nebulization Given 2/21/20 1042)       Diagnostic Studies  Results Reviewed     Procedure Component Value Units Date/Time    Urine Microscopic [[id number]] Collected:  02/21/20 1122    Lab Status:   In process Specimen:  Urine, Clean Catch Updated:  02/21/20 1121    Urine Macroscopic, POC [[id number]]  (Abnormal) Collected:  02/21/20 1122    Lab Status:  Final result Specimen:  Urine Updated:  02/21/20 1116     Color, UA Yellow     Clarity, UA Clear     pH, UA 7 0     Leukocytes, UA Trace     Nitrite, UA Negative     Protein, UA Negative mg/dl      Glucose, UA Negative mg/dl      Ketones, UA Negative mg/dl      Urobilinogen, UA 0 2 E U /dl      Bilirubin, UA Negative     Blood, UA Negative     Specific Gravity, UA 1 020    Narrative:       CLINITEK RESULT    Comprehensive metabolic panel [[id number]]  (Abnormal) Collected:  02/21/20 1052    Lab Status:  Final result Specimen:  Blood from Arm, Right Updated:  02/21/20 1115     Sodium 142 mmol/L      Potassium 4 4 mmol/L      Chloride 107 mmol/L      CO2 28 mmol/L      ANION GAP 7 mmol/L      BUN 12 mg/dL      Creatinine 0 99 mg/dL      Glucose 93 mg/dL      Calcium 9 2 mg/dL      AST 19 U/L      ALT 31 U/L      Alkaline Phosphatase 67 U/L      Total Protein 7 8 g/dL      Albumin 3 3 g/dL      Total Bilirubin 0 40 mg/dL      eGFR 85 ml/min/1 73sq m     Narrative:       [place] guidelines for Chronic Kidney Disease (CKD):     Stage 1 with normal or high GFR (GFR > 90 mL/min/1 73 square meters)    Stage 2 Mild CKD (GFR = 60-89 mL/min/1 73 square meters)    Stage 3A Moderate CKD (GFR = 45-59 mL/min/1 73 square meters)    Stage 3B Moderate CKD (GFR = 30-44 mL/min/1 73 square meters)    Stage 4 Severe CKD (GFR = 15-29 mL/min/1 73 square meters)    Stage 5 End Stage CKD (GFR <15 mL/min/1 73 square meters)  Note: GFR calculation is accurate only with a steady state creatinine    CBC and differential [[id number]]  (Abnormal) Collected:  02/21/20 1052    Lab Status:  Final result Specimen:  Blood from Arm, Right Updated:  02/21/20 1109     WBC 4 74 Thousand/uL      RBC 5 14 Million/uL      Hemoglobin 16 3 g/dL      Hematocrit 49 1 %      MCV 96 fL      MCH 31 7 pg      MCHC 33 2 g/dL      RDW 12 9 %      MPV 10 4 fL      Platelets 269 Thousands/uL      nRBC 0 /100 WBCs      Neutrophils Relative 61 %      Immat GRANS % 0 %      Lymphocytes Relative 27 %      Monocytes Relative 9 %      Eosinophils Relative 2 %      Basophils Relative 1 %      Neutrophils Absolute 2 89 Thousands/µL      Immature Grans Absolute 0 02 Thousand/uL      Lymphocytes Absolute 1 29 Thousands/µL      Monocytes Absolute 0 41 Thousand/µL      Eosinophils Absolute 0 09 Thousand/µL      Basophils Absolute 0 04 Thousands/µL                  XR chest 2 views   Final Result by Myra Hinojosa MD (02/21 1046)      No acute cardiopulmonary disease  Workstation performed: [id number]                    Procedures  Procedures         ED Course                               MDM  Number of Diagnoses or Management Options  Bronchitis:   Diagnosis management comments: Differential diagnosis includes but not limited to:  Upper respiratory infection, bronchitis, pneumonia, COPD exacerbation       Amount and/or Complexity of Data Reviewed  Clinical lab tests: ordered and reviewed  Tests in the radiology section of CPT®: ordered and reviewed          Disposition  Final diagnoses:   Bronchitis     Time reflects when diagnosis was documented in both MDM as applicable and the Disposition within this note     Time User Action Codes Description Comment    2/21/2020 11:19 AM Schuett, [place] Donate Add [J40] Bronchitis       ED Disposition     ED Disposition Condition Date/Time Comment    Discharge Stable Fri Feb 21, 2020 11:19 AM Bohdan Cushing discharge to home/self care              Follow-up Information    None         Patient's Medications   Discharge Prescriptions    AZITHROMYCIN (ZITHROMAX) 250 MG TABLET    Take 1 tablet (250 mg total) by mouth daily for 4 days       Start Date: 2/21/2020 End Date: 2/25/2020       Order Dose: 250 mg       Quantity: 4 tablet    Refills: 0    PREDNISONE 20 MG TABLET    Take 2 tablets (40 mg total) by mouth daily for 4 days       Start Date: 2/21/2020 End Date: 2/25/2020       Order Dose: 40 mg       Quantity: 8 tablet    Refills: 0     No discharge procedures on file      PDMP Review     None          ED Provider  Electronically Signed by           Lindsey Meng PA-C  02/21/20 0397

## 2020-02-22 LAB — BACTERIA UR CULT: NORMAL

## 2020-03-29 DIAGNOSIS — J31.0 RHINOSINUSITIS: ICD-10-CM

## 2020-03-29 DIAGNOSIS — J32.9 RHINOSINUSITIS: ICD-10-CM

## 2020-03-30 DIAGNOSIS — J32.9 RHINOSINUSITIS: ICD-10-CM

## 2020-03-30 DIAGNOSIS — J31.0 RHINOSINUSITIS: ICD-10-CM

## 2020-03-30 RX ORDER — ALBUTEROL SULFATE 90 UG/1
AEROSOL, METERED RESPIRATORY (INHALATION)
Qty: 18 G | Refills: 0 | OUTPATIENT
Start: 2020-03-30

## 2020-03-30 RX ORDER — ALBUTEROL SULFATE 90 UG/1
1 AEROSOL, METERED RESPIRATORY (INHALATION) EVERY 6 HOURS PRN
Qty: 18 G | Refills: 2 | Status: SHIPPED | OUTPATIENT
Start: 2020-03-30 | End: 2020-05-29 | Stop reason: SDUPTHER

## 2020-05-11 DIAGNOSIS — J31.0 RHINOSINUSITIS: ICD-10-CM

## 2020-05-11 DIAGNOSIS — J32.9 RHINOSINUSITIS: ICD-10-CM

## 2020-05-11 RX ORDER — LORATADINE 10 MG/1
10 TABLET ORAL DAILY
Qty: 30 TABLET | Refills: 0 | Status: SHIPPED | OUTPATIENT
Start: 2020-05-11 | End: 2020-05-29 | Stop reason: SDUPTHER

## 2020-05-28 ENCOUNTER — TELEPHONE (OUTPATIENT)
Dept: INTERNAL MEDICINE CLINIC | Facility: CLINIC | Age: 55
End: 2020-05-28

## 2020-05-29 ENCOUNTER — TELEMEDICINE (OUTPATIENT)
Dept: INTERNAL MEDICINE CLINIC | Facility: CLINIC | Age: 55
End: 2020-05-29

## 2020-05-29 DIAGNOSIS — K22.70 BARRETT'S ESOPHAGUS WITHOUT DYSPLASIA: ICD-10-CM

## 2020-05-29 DIAGNOSIS — Z00.01 ANNUAL VISIT FOR GENERAL ADULT MEDICAL EXAMINATION WITH ABNORMAL FINDINGS: ICD-10-CM

## 2020-05-29 DIAGNOSIS — J30.2 SEASONAL ALLERGIC RHINITIS, UNSPECIFIED TRIGGER: ICD-10-CM

## 2020-05-29 DIAGNOSIS — M62.830 PARASPINAL MUSCLE SPASM: ICD-10-CM

## 2020-05-29 DIAGNOSIS — J31.0 RHINOSINUSITIS: ICD-10-CM

## 2020-05-29 DIAGNOSIS — M54.12 CERVICAL RADICULOPATHY: ICD-10-CM

## 2020-05-29 DIAGNOSIS — I10 ESSENTIAL HYPERTENSION: ICD-10-CM

## 2020-05-29 DIAGNOSIS — J32.9 RHINOSINUSITIS: ICD-10-CM

## 2020-05-29 DIAGNOSIS — K74.60 CIRRHOSIS OF LIVER WITHOUT ASCITES, UNSPECIFIED HEPATIC CIRRHOSIS TYPE (HCC): Primary | ICD-10-CM

## 2020-05-29 DIAGNOSIS — K21.9 CHRONIC GERD: ICD-10-CM

## 2020-05-29 PROCEDURE — 99213 OFFICE O/P EST LOW 20 MIN: CPT | Performed by: INTERNAL MEDICINE

## 2020-05-29 RX ORDER — ALBUTEROL SULFATE 90 UG/1
1 AEROSOL, METERED RESPIRATORY (INHALATION) EVERY 6 HOURS PRN
Qty: 18 G | Refills: 2 | Status: SHIPPED | OUTPATIENT
Start: 2020-05-29 | End: 2021-03-23 | Stop reason: ALTCHOICE

## 2020-05-29 RX ORDER — CYCLOBENZAPRINE HCL 10 MG
10 TABLET ORAL 2 TIMES DAILY PRN
Qty: 20 TABLET | Refills: 3 | Status: SHIPPED | OUTPATIENT
Start: 2020-05-29 | End: 2021-03-23 | Stop reason: ALTCHOICE

## 2020-05-29 RX ORDER — LORATADINE 10 MG/1
10 TABLET ORAL DAILY
Qty: 30 TABLET | Refills: 0 | Status: SHIPPED | OUTPATIENT
Start: 2020-05-29 | End: 2020-10-12

## 2020-05-29 RX ORDER — FLUTICASONE PROPIONATE 50 MCG
1 SPRAY, SUSPENSION (ML) NASAL DAILY
Qty: 1 BOTTLE | Refills: 3 | Status: SHIPPED | OUTPATIENT
Start: 2020-05-29 | End: 2021-03-23 | Stop reason: ALTCHOICE

## 2020-05-29 RX ORDER — BLOOD PRESSURE TEST KIT
KIT MISCELLANEOUS DAILY
Qty: 1 EACH | Refills: 0 | Status: SHIPPED | OUTPATIENT
Start: 2020-05-29 | End: 2021-03-23 | Stop reason: ALTCHOICE

## 2020-05-29 RX ORDER — ATORVASTATIN CALCIUM 20 MG/1
20 TABLET, FILM COATED ORAL DAILY
Qty: 90 TABLET | Refills: 3 | Status: SHIPPED | OUTPATIENT
Start: 2020-05-29

## 2020-05-29 RX ORDER — NAPROXEN 500 MG/1
500 TABLET ORAL 2 TIMES DAILY WITH MEALS
Qty: 30 TABLET | Refills: 3 | Status: SHIPPED | OUTPATIENT
Start: 2020-05-29

## 2020-05-29 RX ORDER — LISINOPRIL 10 MG/1
10 TABLET ORAL DAILY
Qty: 90 TABLET | Refills: 2 | Status: SHIPPED | OUTPATIENT
Start: 2020-05-29

## 2020-10-12 DIAGNOSIS — J32.9 RHINOSINUSITIS: ICD-10-CM

## 2020-10-12 DIAGNOSIS — J31.0 RHINOSINUSITIS: ICD-10-CM

## 2020-10-12 RX ORDER — LORATADINE 10 MG/1
TABLET ORAL
Qty: 30 TABLET | Refills: 0 | Status: SHIPPED | OUTPATIENT
Start: 2020-10-12 | End: 2021-02-02 | Stop reason: SDUPTHER

## 2021-01-20 ENCOUNTER — TELEPHONE (OUTPATIENT)
Dept: INTERNAL MEDICINE CLINIC | Facility: CLINIC | Age: 56
End: 2021-01-20

## 2021-01-29 DIAGNOSIS — J31.0 RHINOSINUSITIS: ICD-10-CM

## 2021-01-29 DIAGNOSIS — K21.9 CHRONIC GERD: ICD-10-CM

## 2021-01-29 DIAGNOSIS — J32.9 RHINOSINUSITIS: ICD-10-CM

## 2021-02-02 RX ORDER — LORATADINE 10 MG/1
TABLET ORAL
Qty: 30 TABLET | Refills: 0 | Status: SHIPPED | OUTPATIENT
Start: 2021-02-02 | End: 2021-03-23 | Stop reason: ALTCHOICE

## 2021-02-02 RX ORDER — LANSOPRAZOLE 15 MG/1
CAPSULE, DELAYED RELEASE ORAL
Qty: 90 CAPSULE | Refills: 3 | Status: SHIPPED | OUTPATIENT
Start: 2021-02-02

## 2021-03-22 ENCOUNTER — APPOINTMENT (EMERGENCY)
Dept: RADIOLOGY | Facility: HOSPITAL | Age: 56
DRG: 247 | End: 2021-03-22
Payer: COMMERCIAL

## 2021-03-22 ENCOUNTER — HOSPITAL ENCOUNTER (INPATIENT)
Facility: HOSPITAL | Age: 56
LOS: 1 days | Discharge: HOME | DRG: 247 | End: 2021-03-25
Attending: EMERGENCY MEDICINE | Admitting: SURGERY
Payer: COMMERCIAL

## 2021-03-22 ENCOUNTER — APPOINTMENT (EMERGENCY)
Dept: CT IMAGING | Facility: HOSPITAL | Age: 56
DRG: 247 | End: 2021-03-22
Payer: COMMERCIAL

## 2021-03-22 ENCOUNTER — TELEPHONE (OUTPATIENT)
Dept: CT IMAGING | Facility: HOSPITAL | Age: 56
End: 2021-03-22

## 2021-03-22 DIAGNOSIS — K56.609 SMALL BOWEL OBSTRUCTION (HCC): Primary | ICD-10-CM

## 2021-03-22 DIAGNOSIS — R10.9 ABDOMINAL PAIN: ICD-10-CM

## 2021-03-22 LAB
ALBUMIN SERPL BCP-MCNC: 3.8 G/DL (ref 3.5–5)
ALP SERPL-CCNC: 63 U/L (ref 46–116)
ALT SERPL W P-5'-P-CCNC: 29 U/L (ref 12–78)
ANION GAP SERPL CALCULATED.3IONS-SCNC: 10 MMOL/L (ref 4–13)
APTT PPP: 29 SECONDS (ref 23–37)
AST SERPL W P-5'-P-CCNC: 22 U/L (ref 5–45)
BASOPHILS # BLD AUTO: 0.03 THOUSANDS/ΜL (ref 0–0.1)
BASOPHILS NFR BLD AUTO: 1 % (ref 0–1)
BILIRUB SERPL-MCNC: 0.5 MG/DL (ref 0.2–1)
BUN SERPL-MCNC: 12 MG/DL (ref 5–25)
CALCIUM SERPL-MCNC: 9.2 MG/DL (ref 8.3–10.1)
CHLORIDE SERPL-SCNC: 106 MMOL/L (ref 100–108)
CO2 SERPL-SCNC: 26 MMOL/L (ref 21–32)
CREAT SERPL-MCNC: 1.01 MG/DL (ref 0.6–1.3)
EOSINOPHIL # BLD AUTO: 0.05 THOUSAND/ΜL (ref 0–0.61)
EOSINOPHIL NFR BLD AUTO: 1 % (ref 0–6)
ERYTHROCYTE [DISTWIDTH] IN BLOOD BY AUTOMATED COUNT: 13.5 % (ref 11.6–15.1)
GFR SERPL CREATININE-BSD FRML MDRD: 82 ML/MIN/1.73SQ M
GLUCOSE SERPL-MCNC: 107 MG/DL (ref 65–140)
HCT VFR BLD AUTO: 42 % (ref 36.5–49.3)
HGB BLD-MCNC: 14 G/DL (ref 12–17)
IMM GRANULOCYTES # BLD AUTO: 0 THOUSAND/UL (ref 0–0.2)
IMM GRANULOCYTES NFR BLD AUTO: 0 % (ref 0–2)
INR PPP: 1.07 (ref 0.84–1.19)
LACTATE SERPL-SCNC: 0.6 MMOL/L (ref 0.5–2)
LIPASE SERPL-CCNC: 34 U/L (ref 73–393)
LYMPHOCYTES # BLD AUTO: 0.77 THOUSANDS/ΜL (ref 0.6–4.47)
LYMPHOCYTES NFR BLD AUTO: 17 % (ref 14–44)
MCH RBC QN AUTO: 31.6 PG (ref 26.8–34.3)
MCHC RBC AUTO-ENTMCNC: 33.3 G/DL (ref 31.4–37.4)
MCV RBC AUTO: 95 FL (ref 82–98)
MONOCYTES # BLD AUTO: 0.43 THOUSAND/ΜL (ref 0.17–1.22)
MONOCYTES NFR BLD AUTO: 10 % (ref 4–12)
NEUTROPHILS # BLD AUTO: 3.26 THOUSANDS/ΜL (ref 1.85–7.62)
NEUTS SEG NFR BLD AUTO: 71 % (ref 43–75)
NRBC BLD AUTO-RTO: 0 /100 WBCS
PLATELET # BLD AUTO: 128 THOUSANDS/UL (ref 149–390)
PMV BLD AUTO: 10.1 FL (ref 8.9–12.7)
POTASSIUM SERPL-SCNC: 3.3 MMOL/L (ref 3.5–5.3)
PROT SERPL-MCNC: 6.9 G/DL (ref 6.4–8.2)
PROTHROMBIN TIME: 14 SECONDS (ref 11.6–14.5)
RBC # BLD AUTO: 4.43 MILLION/UL (ref 3.88–5.62)
SODIUM SERPL-SCNC: 142 MMOL/L (ref 136–145)
WBC # BLD AUTO: 4.54 THOUSAND/UL (ref 4.31–10.16)

## 2021-03-22 PROCEDURE — 83605 ASSAY OF LACTIC ACID: CPT | Performed by: EMERGENCY MEDICINE

## 2021-03-22 PROCEDURE — 80053 COMPREHEN METABOLIC PANEL: CPT | Performed by: EMERGENCY MEDICINE

## 2021-03-22 PROCEDURE — 83690 ASSAY OF LIPASE: CPT | Performed by: EMERGENCY MEDICINE

## 2021-03-22 PROCEDURE — 85730 THROMBOPLASTIN TIME PARTIAL: CPT | Performed by: EMERGENCY MEDICINE

## 2021-03-22 PROCEDURE — 99285 EMERGENCY DEPT VISIT HI MDM: CPT | Performed by: EMERGENCY MEDICINE

## 2021-03-22 PROCEDURE — G1004 CDSM NDSC: HCPCS

## 2021-03-22 PROCEDURE — 74177 CT ABD & PELVIS W/CONTRAST: CPT

## 2021-03-22 PROCEDURE — 85025 COMPLETE CBC W/AUTO DIFF WBC: CPT | Performed by: EMERGENCY MEDICINE

## 2021-03-22 PROCEDURE — 85610 PROTHROMBIN TIME: CPT | Performed by: EMERGENCY MEDICINE

## 2021-03-22 PROCEDURE — 99285 EMERGENCY DEPT VISIT HI MDM: CPT

## 2021-03-22 PROCEDURE — 96361 HYDRATE IV INFUSION ADD-ON: CPT

## 2021-03-22 PROCEDURE — 71045 X-RAY EXAM CHEST 1 VIEW: CPT

## 2021-03-22 PROCEDURE — 96374 THER/PROPH/DIAG INJ IV PUSH: CPT

## 2021-03-22 PROCEDURE — 36415 COLL VENOUS BLD VENIPUNCTURE: CPT | Performed by: EMERGENCY MEDICINE

## 2021-03-22 RX ORDER — ONDANSETRON 2 MG/ML
4 INJECTION INTRAMUSCULAR; INTRAVENOUS ONCE
Status: DISCONTINUED | OUTPATIENT
Start: 2021-03-22 | End: 2021-03-25 | Stop reason: HOSPADM

## 2021-03-22 RX ORDER — SODIUM CHLORIDE 9 MG/ML
125 INJECTION, SOLUTION INTRAVENOUS CONTINUOUS
Status: DISCONTINUED | OUTPATIENT
Start: 2021-03-22 | End: 2021-03-25

## 2021-03-22 RX ORDER — HYDROMORPHONE HCL/PF 1 MG/ML
0.5 SYRINGE (ML) INJECTION ONCE
Status: COMPLETED | OUTPATIENT
Start: 2021-03-22 | End: 2021-03-22

## 2021-03-22 RX ADMIN — SODIUM CHLORIDE 125 ML/HR: 0.9 INJECTION, SOLUTION INTRAVENOUS at 22:45

## 2021-03-22 RX ADMIN — SODIUM CHLORIDE 1000 ML: 0.9 INJECTION, SOLUTION INTRAVENOUS at 22:45

## 2021-03-22 RX ADMIN — HYDROMORPHONE HYDROCHLORIDE 0.5 MG: 1 INJECTION, SOLUTION INTRAMUSCULAR; INTRAVENOUS; SUBCUTANEOUS at 23:31

## 2021-03-23 ENCOUNTER — TELEPHONE (OUTPATIENT)
Dept: CT IMAGING | Facility: HOSPITAL | Age: 56
End: 2021-03-23

## 2021-03-23 LAB
ALBUMIN SERPL BCP-MCNC: 3.6 G/DL (ref 3.5–5)
ALP SERPL-CCNC: 62 U/L (ref 46–116)
ALT SERPL W P-5'-P-CCNC: 28 U/L (ref 12–78)
ANION GAP SERPL CALCULATED.3IONS-SCNC: 7 MMOL/L (ref 4–13)
ANION GAP SERPL CALCULATED.3IONS-SCNC: 8 MMOL/L (ref 4–13)
AST SERPL W P-5'-P-CCNC: 31 U/L (ref 5–45)
BASOPHILS # BLD AUTO: 0.04 THOUSANDS/ΜL (ref 0–0.1)
BASOPHILS NFR BLD AUTO: 1 % (ref 0–1)
BILIRUB SERPL-MCNC: 0.64 MG/DL (ref 0.2–1)
BILIRUB UR QL STRIP: NEGATIVE
BUN SERPL-MCNC: 11 MG/DL (ref 5–25)
BUN SERPL-MCNC: 11 MG/DL (ref 5–25)
CALCIUM SERPL-MCNC: 8.9 MG/DL (ref 8.3–10.1)
CALCIUM SERPL-MCNC: 9.1 MG/DL (ref 8.3–10.1)
CHLORIDE SERPL-SCNC: 106 MMOL/L (ref 100–108)
CHLORIDE SERPL-SCNC: 108 MMOL/L (ref 100–108)
CLARITY UR: ABNORMAL
CO2 SERPL-SCNC: 23 MMOL/L (ref 21–32)
CO2 SERPL-SCNC: 25 MMOL/L (ref 21–32)
COLOR UR: ABNORMAL
CREAT SERPL-MCNC: 0.78 MG/DL (ref 0.6–1.3)
CREAT SERPL-MCNC: 0.88 MG/DL (ref 0.6–1.3)
EOSINOPHIL # BLD AUTO: 0.02 THOUSAND/ΜL (ref 0–0.61)
EOSINOPHIL NFR BLD AUTO: 0 % (ref 0–6)
ERYTHROCYTE [DISTWIDTH] IN BLOOD BY AUTOMATED COUNT: 13.7 % (ref 11.6–15.1)
GFR SERPL CREATININE-BSD FRML MDRD: 100 ML/MIN/1.73SQ M
GFR SERPL CREATININE-BSD FRML MDRD: 95 ML/MIN/1.73SQ M
GLUCOSE SERPL-MCNC: 109 MG/DL (ref 65–140)
GLUCOSE SERPL-MCNC: 120 MG/DL (ref 65–140)
GLUCOSE UR STRIP-MCNC: NEGATIVE MG/DL
HCT VFR BLD AUTO: 44.5 % (ref 36.5–49.3)
HGB BLD-MCNC: 14.6 G/DL (ref 12–17)
HGB UR QL STRIP.AUTO: NEGATIVE
IMM GRANULOCYTES # BLD AUTO: 0.02 THOUSAND/UL (ref 0–0.2)
IMM GRANULOCYTES NFR BLD AUTO: 0 % (ref 0–2)
KETONES UR STRIP-MCNC: ABNORMAL MG/DL
LEUKOCYTE ESTERASE UR QL STRIP: NEGATIVE
LYMPHOCYTES # BLD AUTO: 0.84 THOUSANDS/ΜL (ref 0.6–4.47)
LYMPHOCYTES NFR BLD AUTO: 18 % (ref 14–44)
MCH RBC QN AUTO: 31.5 PG (ref 26.8–34.3)
MCHC RBC AUTO-ENTMCNC: 32.8 G/DL (ref 31.4–37.4)
MCV RBC AUTO: 96 FL (ref 82–98)
MONOCYTES # BLD AUTO: 0.37 THOUSAND/ΜL (ref 0.17–1.22)
MONOCYTES NFR BLD AUTO: 8 % (ref 4–12)
NEUTROPHILS # BLD AUTO: 3.28 THOUSANDS/ΜL (ref 1.85–7.62)
NEUTS SEG NFR BLD AUTO: 73 % (ref 43–75)
NITRITE UR QL STRIP: NEGATIVE
NRBC BLD AUTO-RTO: 0 /100 WBCS
PH UR STRIP.AUTO: 7.5 [PH]
PLATELET # BLD AUTO: 131 THOUSANDS/UL (ref 149–390)
PMV BLD AUTO: 10 FL (ref 8.9–12.7)
POTASSIUM SERPL-SCNC: 5.7 MMOL/L (ref 3.5–5.3)
POTASSIUM SERPL-SCNC: 5.8 MMOL/L (ref 3.5–5.3)
PROT SERPL-MCNC: 7 G/DL (ref 6.4–8.2)
PROT UR STRIP-MCNC: NEGATIVE MG/DL
RBC # BLD AUTO: 4.63 MILLION/UL (ref 3.88–5.62)
SODIUM SERPL-SCNC: 138 MMOL/L (ref 136–145)
SODIUM SERPL-SCNC: 139 MMOL/L (ref 136–145)
SP GR UR STRIP.AUTO: 1.02 (ref 1–1.03)
UROBILINOGEN UR QL STRIP.AUTO: 0.2 E.U./DL
WBC # BLD AUTO: 4.57 THOUSAND/UL (ref 4.31–10.16)

## 2021-03-23 PROCEDURE — 80048 BASIC METABOLIC PNL TOTAL CA: CPT | Performed by: SURGERY

## 2021-03-23 PROCEDURE — 85025 COMPLETE CBC W/AUTO DIFF WBC: CPT | Performed by: SURGERY

## 2021-03-23 PROCEDURE — 81003 URINALYSIS AUTO W/O SCOPE: CPT | Performed by: EMERGENCY MEDICINE

## 2021-03-23 PROCEDURE — 80053 COMPREHEN METABOLIC PANEL: CPT | Performed by: SURGERY

## 2021-03-23 PROCEDURE — 93005 ELECTROCARDIOGRAM TRACING: CPT

## 2021-03-23 PROCEDURE — 96361 HYDRATE IV INFUSION ADD-ON: CPT

## 2021-03-23 PROCEDURE — 36415 COLL VENOUS BLD VENIPUNCTURE: CPT | Performed by: SURGERY

## 2021-03-23 PROCEDURE — 99222 1ST HOSP IP/OBS MODERATE 55: CPT | Performed by: SURGERY

## 2021-03-23 PROCEDURE — 96376 TX/PRO/DX INJ SAME DRUG ADON: CPT

## 2021-03-23 PROCEDURE — C9113 INJ PANTOPRAZOLE SODIUM, VIA: HCPCS | Performed by: SURGERY

## 2021-03-23 RX ORDER — HYDROMORPHONE HCL/PF 1 MG/ML
0.5 SYRINGE (ML) INJECTION
Status: DISCONTINUED | OUTPATIENT
Start: 2021-03-23 | End: 2021-03-25 | Stop reason: HOSPADM

## 2021-03-23 RX ORDER — MIDAZOLAM HYDROCHLORIDE 2 MG/2ML
2 INJECTION, SOLUTION INTRAMUSCULAR; INTRAVENOUS ONCE
Status: DISCONTINUED | OUTPATIENT
Start: 2021-03-23 | End: 2021-03-25 | Stop reason: HOSPADM

## 2021-03-23 RX ORDER — HYDROMORPHONE HCL/PF 1 MG/ML
0.5 SYRINGE (ML) INJECTION ONCE
Status: COMPLETED | OUTPATIENT
Start: 2021-03-23 | End: 2021-03-23

## 2021-03-23 RX ORDER — POTASSIUM CHLORIDE 14.9 MG/ML
20 INJECTION INTRAVENOUS
Status: DISCONTINUED | OUTPATIENT
Start: 2021-03-23 | End: 2021-03-23

## 2021-03-23 RX ORDER — LORAZEPAM 2 MG/ML
0.5 INJECTION INTRAMUSCULAR ONCE
Status: DISCONTINUED | OUTPATIENT
Start: 2021-03-23 | End: 2021-03-25 | Stop reason: HOSPADM

## 2021-03-23 RX ORDER — ONDANSETRON 2 MG/ML
4 INJECTION INTRAMUSCULAR; INTRAVENOUS EVERY 4 HOURS PRN
Status: DISCONTINUED | OUTPATIENT
Start: 2021-03-23 | End: 2021-03-25 | Stop reason: HOSPADM

## 2021-03-23 RX ORDER — PANTOPRAZOLE SODIUM 40 MG/1
40 INJECTION, POWDER, FOR SOLUTION INTRAVENOUS
Status: DISCONTINUED | OUTPATIENT
Start: 2021-03-23 | End: 2021-03-25 | Stop reason: HOSPADM

## 2021-03-23 RX ORDER — ALBUTEROL SULFATE 90 UG/1
1 AEROSOL, METERED RESPIRATORY (INHALATION) EVERY 6 HOURS PRN
Status: DISCONTINUED | OUTPATIENT
Start: 2021-03-23 | End: 2021-03-25 | Stop reason: HOSPADM

## 2021-03-23 RX ORDER — POTASSIUM CHLORIDE 29.8 MG/ML
40 INJECTION INTRAVENOUS ONCE
Status: DISCONTINUED | OUTPATIENT
Start: 2021-03-23 | End: 2021-03-23

## 2021-03-23 RX ORDER — NICOTINE 21 MG/24HR
1 PATCH, TRANSDERMAL 24 HOURS TRANSDERMAL DAILY
Status: DISCONTINUED | OUTPATIENT
Start: 2021-03-23 | End: 2021-03-25 | Stop reason: HOSPADM

## 2021-03-23 RX ORDER — HEPARIN SODIUM 5000 [USP'U]/ML
5000 INJECTION, SOLUTION INTRAVENOUS; SUBCUTANEOUS EVERY 8 HOURS SCHEDULED
Status: DISCONTINUED | OUTPATIENT
Start: 2021-03-23 | End: 2021-03-25 | Stop reason: HOSPADM

## 2021-03-23 RX ORDER — HYDROMORPHONE HCL/PF 1 MG/ML
0.2 SYRINGE (ML) INJECTION
Status: DISCONTINUED | OUTPATIENT
Start: 2021-03-23 | End: 2021-03-25 | Stop reason: HOSPADM

## 2021-03-23 RX ADMIN — HYDROMORPHONE HYDROCHLORIDE 0.5 MG: 1 INJECTION, SOLUTION INTRAMUSCULAR; INTRAVENOUS; SUBCUTANEOUS at 01:54

## 2021-03-23 RX ADMIN — HEPARIN SODIUM 5000 UNITS: 5000 INJECTION INTRAVENOUS; SUBCUTANEOUS at 13:01

## 2021-03-23 RX ADMIN — HEPARIN SODIUM 5000 UNITS: 5000 INJECTION INTRAVENOUS; SUBCUTANEOUS at 21:14

## 2021-03-23 RX ADMIN — HYDROMORPHONE HYDROCHLORIDE 0.5 MG: 1 INJECTION, SOLUTION INTRAMUSCULAR; INTRAVENOUS; SUBCUTANEOUS at 21:15

## 2021-03-23 RX ADMIN — POTASSIUM CHLORIDE 20 MEQ: 14.9 INJECTION, SOLUTION INTRAVENOUS at 03:07

## 2021-03-23 RX ADMIN — NICOTINE 1 PATCH: 21 PATCH, EXTENDED RELEASE TRANSDERMAL at 09:45

## 2021-03-23 RX ADMIN — IOHEXOL 85 ML: 350 INJECTION, SOLUTION INTRAVENOUS at 01:02

## 2021-03-23 RX ADMIN — HEPARIN SODIUM 5000 UNITS: 5000 INJECTION INTRAVENOUS; SUBCUTANEOUS at 09:38

## 2021-03-23 RX ADMIN — HYDROMORPHONE HYDROCHLORIDE 0.2 MG: 1 INJECTION, SOLUTION INTRAMUSCULAR; INTRAVENOUS; SUBCUTANEOUS at 09:51

## 2021-03-23 RX ADMIN — SODIUM CHLORIDE 125 ML/HR: 0.9 INJECTION, SOLUTION INTRAVENOUS at 20:13

## 2021-03-23 RX ADMIN — HYDROMORPHONE HYDROCHLORIDE 0.2 MG: 1 INJECTION, SOLUTION INTRAMUSCULAR; INTRAVENOUS; SUBCUTANEOUS at 13:00

## 2021-03-23 RX ADMIN — PANTOPRAZOLE SODIUM 40 MG: 40 INJECTION, POWDER, FOR SOLUTION INTRAVENOUS at 11:32

## 2021-03-23 RX ADMIN — SODIUM CHLORIDE 125 ML/HR: 0.9 INJECTION, SOLUTION INTRAVENOUS at 11:19

## 2021-03-23 NOTE — ED NOTES
Pt stated "you're a fucking bitch, get the fuck out of my room! You've been a little bitch to me the entire time I've been here " Made pt aware that statement is untrue and he is not allowed to speak to staff in such a way  Provider reiterated that speaking to staff disrespectfully will not be tolerated  Pt not receptive at this time       Uyen Jean RN  03/23/21 7948

## 2021-03-23 NOTE — ED PROVIDER NOTES
History  Chief Complaint   Patient presents with    Abdominal Pain     started at 1PM, last BM this morning, diarrhea     Patient is a 61year old male with worsening intermittent and now constant diffuse abdominal pain with nausea and no vomiting  Had diarrhea yesterday and no BM today  No fever  No flatus  Has had prior appy and hernia repair  Was last seen in this ED on 20 for bronchitis  [facility] checked on this patient and no RX found  No travel  No raw meat, eggs, fish or recent abx use  No GI bleeding  No urinary sx  No cough  History provided by:  Patient and EMS personnel   used: No    Abdominal Pain  Associated symptoms: diarrhea (prior) and nausea    Associated symptoms: no cough, no fever and no vomiting        Prior to Admission Medications   Prescriptions Last Dose Informant Patient Reported? Taking?    Blood Pressure KIT   No No   Sig: by Does not apply route daily   albuterol (Ventolin HFA) 90 mcg/act inhaler   No No   Sig: Inhale 1 puff every 6 (six) hours as needed for wheezing or shortness of breath   atorvastatin (LIPITOR) 20 mg tablet   No No   Sig: Take 1 tablet (20 mg total) by mouth daily   cyclobenzaprine (FLEXERIL) 10 mg tablet   No No   Sig: Take 1 tablet (10 mg total) by mouth 2 (two) times a day as needed for muscle spasms   fluticasone (FLONASE) 50 mcg/act nasal spray   No No   Si spray into each nostril daily   lansoprazole (PREVACID) 15 mg capsule   No No   Sig: take 1 capsule by mouth once daily   lisinopril (ZESTRIL) 10 mg tablet   No No   Sig: Take 1 tablet (10 mg total) by mouth daily   loratadine (CLARITIN) 10 mg tablet   No No   Sig: take 1 tablet by mouth once daily   naproxen (NAPROSYN) 500 mg tablet   No No   Sig: Take 1 tablet (500 mg total) by mouth 2 (two) times a day with meals      Facility-Administered Medications: None       Past Medical History:   Diagnosis Date    Anxiety     Asthma     Peterson's esophagus     Chest pain     Last assessed - 7/31/17    Chronic GERD     Last assessed - 3/11/16    COPD (chronic obstructive pulmonary disease) (HCC)     Hep C w/o coma, chronic (HCC)     treated    Hepatic disease     History of neck problems     Hypercholesterolemia     Hypertension     Kidney disease     Kidney stones        Past Surgical History:   Procedure Laterality Date    APPENDECTOMY      ESOPHAGOGASTRODUODENOSCOPY      HERNIA REPAIR      Resolved     CO ESOPHAGOGASTRODUODENOSCOPY TRANSORAL DIAGNOSTIC N/A 11/30/2017    Procedure: ESOPHAGOGASTRODUODENOSCOPY (EGD); Surgeon: Michelle Diana DO;  Location: [facility]; Service: Gastroenterology       Family History   Problem Relation Age of Onset    Hyperlipidemia Mother     Cancer Mother         Malignant Neoplasm     Heart attack Father     Lung cancer Maternal Grandmother     Lung cancer Maternal Grandfather     Heart attack Maternal Grandfather     No Known Problems Paternal Grandmother     No Known Problems Paternal Grandfather     Other Other         Cardiac Disorder    Stroke Other      I have reviewed and agree with the history as documented  E-Cigarette/Vaping    E-Cigarette Use Never User      E-Cigarette/Vaping Substances     Social History     Tobacco Use    Smoking status: Current Every Day Smoker     Packs/day: 1 00     Types: Cigarettes    Smokeless tobacco: Current User    Tobacco comment: since 14 yrs old, Smokes less than 1 pack per day  per [employer]    Substance Use Topics    Alcohol use: No    Drug use: Yes     Types: Methamphetamines     Comment: Hx of use, states clean since 1/2014, used meth this am 3/22/21       Review of Systems   Constitutional: Negative for fever  Respiratory: Negative for cough  Gastrointestinal: Positive for abdominal pain, diarrhea (prior) and nausea  Negative for blood in stool and vomiting  Genitourinary: Negative for difficulty urinating     All other systems reviewed and are negative  Physical Exam  Physical Exam  Vitals signs and nursing note reviewed  Constitutional:       General: He is in acute distress (moderate)  HENT:      Head: Normocephalic and atraumatic  Mouth/Throat:      Mouth: Mucous membranes are moist    Eyes:      General: No scleral icterus  Neck:      Musculoskeletal: Normal range of motion and neck supple  Cardiovascular:      Rate and Rhythm: Normal rate and regular rhythm  Heart sounds: Normal heart sounds  No murmur  Pulmonary:      Effort: Pulmonary effort is normal  No respiratory distress  Breath sounds: Normal breath sounds  No stridor  No wheezing, rhonchi or rales  Abdominal:      General: There is no distension  Palpations: Abdomen is soft  Tenderness: There is abdominal tenderness (diffuse)  There is no guarding or rebound  Comments: Bowel sounds diminished  Musculoskeletal:         General: No deformity  Right lower leg: No edema  Left lower leg: No edema  Skin:     General: Skin is warm and dry  Coloration: Skin is not jaundiced  Findings: No rash  Neurological:      General: No focal deficit present  Mental Status: He is alert and oriented to person, place, and time     Psychiatric:         Mood and Affect: Mood normal          Vital Signs  ED Triage Vitals   Temperature Pulse Respirations Blood Pressure SpO2   03/22/21 2244 03/22/21 2244 03/22/21 2244 03/22/21 2244 03/22/21 2244   98 7 °F (37 1 °C) 68 16 161/78 96 %      Temp Source Heart Rate Source Patient Position - Orthostatic VS BP Location FiO2 (%)   03/22/21 2244 03/22/21 2244 03/22/21 2244 -- --   Oral Monitor Lying        Pain Score       03/22/21 2331       Worst Possible Pain           Vitals:    03/22/21 2244   BP: 161/78   Pulse: 68   Patient Position - Orthostatic VS: Lying         Visual Acuity      ED Medications  Medications   sodium chloride 0 9 % infusion (125 mL/hr Intravenous New Bag 3/22/21 2245) ondansetron [facility]) injection 4 mg (0 mg Intravenous Hold 3/22/21 2330)   midazolam (VERSED) injection 2 mg (has no administration in time range)   benzocaine (HURRICAINE) 20 % mucosal spray 2 spray (has no administration in time range)   nicotine (NICODERM CQ) 21 mg/24 hr TD 24 hr patch 1 patch (has no administration in time range)   heparin (porcine) subcutaneous injection 5,000 Units (has no administration in time range)   HYDROmorphone (DILAUDID) injection 0 2 mg (has no administration in time range)   HYDROmorphone (DILAUDID) injection 0 5 mg (has no administration in time range)   HYDROmorphone (DILAUDID) injection 0 5 mg (has no administration in time range)   sodium chloride 0 9 % bolus 1,000 mL (0 mL Intravenous Stopped 3/22/21 2345)   HYDROmorphone (DILAUDID) injection 0 5 mg (0 5 mg Intravenous Given 3/22/21 2331)   iohexol (OMNIPAQUE) 350 MG/ML injection (SINGLE-DOSE) 85 mL (85 mL Intravenous Given 3/23/21 0102)   HYDROmorphone (DILAUDID) injection 0 5 mg (0 5 mg Intravenous Given 3/23/21 0154)       Diagnostic Studies  Results Reviewed     Procedure Component Value Units Date/Time    Platelet count [[id number]]     Lab Status: No result Specimen: Blood     UA w Reflex to Microscopic w Reflex to Culture [[id number]]  (Abnormal) Collected: 03/23/21 0129    Lab Status: Final result Specimen: Urine, Clean Catch Updated: 03/23/21 0132     Color, UA Light Yellow     Clarity, UA Slightly Cloudy     Specific [place], UA 1 020     pH, UA 7 5     Leukocytes, UA Negative     Nitrite, UA Negative     Protein, UA Negative mg/dl      Glucose, UA Negative mg/dl      Ketones, UA 15 (1+) mg/dl      Urobilinogen, UA 0 2 E U /dl      Bilirubin, UA Negative     Blood, UA Negative    Protime-INR [[id number]]  (Normal) Collected: 03/22/21 2336    Lab Status: Final result Specimen: Blood Updated: 03/22/21 2359     Protime 14 0 seconds      INR 1 07    APTT [[id number]]  (Normal) Collected: 03/22/21 2336    Lab Status: Final result Specimen: Blood Updated: 03/22/21 2359     PTT 29 seconds     Lactic acid [[id number]]  (Normal) Collected: 03/22/21 2329    Lab Status: Final result Specimen: Blood Updated: 03/22/21 2352     LACTIC ACID 0 6 mmol/L     Narrative:      Result may be elevated if tourniquet was used during collection      Comprehensive metabolic panel [[id number]]  (Abnormal) Collected: 03/22/21 2329    Lab Status: Final result Specimen: Blood Updated: 03/22/21 2346     Sodium 142 mmol/L      Potassium 3 3 mmol/L      Chloride 106 mmol/L      CO2 26 mmol/L      ANION GAP 10 mmol/L      BUN 12 mg/dL      Creatinine 1 01 mg/dL      Glucose 107 mg/dL      Calcium 9 2 mg/dL      AST 22 U/L      ALT 29 U/L      Alkaline Phosphatase 63 U/L      Total Protein 6 9 g/dL      Albumin 3 8 g/dL      Total Bilirubin 0 50 mg/dL      eGFR 82 ml/min/1 73sq m     Narrative:      National Kidney Disease Foundation guidelines for Chronic Kidney Disease (CKD):     Stage 1 with normal or high GFR (GFR > 90 mL/min/1 73 square meters)    Stage 2 Mild CKD (GFR = 60-89 mL/min/1 73 square meters)    Stage 3A Moderate CKD (GFR = 45-59 mL/min/1 73 square meters)    Stage 3B Moderate CKD (GFR = 30-44 mL/min/1 73 square meters)    Stage 4 Severe CKD (GFR = 15-29 mL/min/1 73 square meters)    Stage 5 End Stage CKD (GFR <15 mL/min/1 73 square meters)  Note: GFR calculation is accurate only with a steady state creatinine    Lipase [[id number]]  (Abnormal) Collected: 03/22/21 2329    Lab Status: Final result Specimen: Blood Updated: 03/22/21 2346     Lipase 34 u/L     CBC and differential [[id number]]  (Abnormal) Resulted: 03/22/21 2338    Lab Status: Final result Specimen: Blood Updated: 03/22/21 2338     WBC 4 54 Thousand/uL      RBC 4 43 Million/uL      Hemoglobin 14 0 g/dL      Hematocrit 42 0 %      MCV 95 fL      MCH 31 6 pg      MCHC 33 3 g/dL      RDW 13 5 %      MPV 10 1 fL      Platelets 679 Thousands/uL      nRBC 0 /100 WBCs      Neutrophils Relative 71 %      Immat GRANS % 0 %      Lymphocytes Relative 17 %      Monocytes Relative 10 %      Eosinophils Relative 1 %      Basophils Relative 1 %      Neutrophils Absolute 3 26 Thousands/µL      Immature Grans Absolute 0 00 Thousand/uL      Lymphocytes Absolute 0 77 Thousands/µL      Monocytes Absolute 0 43 Thousand/µL      Eosinophils Absolute 0 05 Thousand/µL      Basophils Absolute 0 03 Thousands/µL                  CT abdomen pelvis with contrast   ED Interpretation by Tita Mckeon MD (03/23 0201)   FINDINGS:     ABDOMEN     LOWER CHEST:  Atelectatic changes are noted at the lung bases        LIVER/BILIARY TREE:  The liver demonstrates cirrhotic morphology  Within the limitations of this examination there is no evidence of suspicious hepatic mass  No biliary dilatation  Portal vein is patent      GALLBLADDER:  No calcified gallstones  No pericholecystic inflammatory change      SPLEEN:  Unremarkable      PANCREAS:  Unremarkable      ADRENAL GLANDS:  Unremarkable      KIDNEYS/URETERS:  Unremarkable  No hydronephrosis      STOMACH AND BOWEL:  Multiple abnormally dilated, fluid-filled small bowel loops with collapse of bowel loops distally  The transition point is identified in the central abdomen on image 60 of series 601  Extensive sigmoid diverticulosis without   evidence of diverticulitis  There appears to be a prominent, feces-filled diverticulum in the sigmoid region      APPENDIX:  No findings to suggest appendicitis      ABDOMINOPELVIC CAVITY:  Small amount of abdomino   pelvic ascites  No pneumoperitoneum  No lymphadenopathy      VESSELS:  Unremarkable for patient's age      PELVIS     REPRODUCTIVE ORGANS:  Unremarkable for patient's age      URINARY BLADDER:  Unremarkable      ABDOMINAL WALL/INGUINAL REGIONS:  Unremarkable      OSSEOUS STRUCTURES:  No acute fracture or destructive osseous lesion      IMPRESSION:     1    Findings consistent with small bowel obstruction with transition point identified in the central abdomen  Surgical consultation is recommended  2   Small amount of abdominopelvic ascites  3   Hepatic cirrhosis      I personally discussed this study with [facility] on 3/23/2021 at 1:55 AM                  Workstation performed: [id number]      Final Result by Roselle Schirmer, MD (03/23 0159)      1  Findings consistent with small bowel obstruction with transition point identified in the central abdomen  Surgical consultation is recommended  2   Small amount of abdominopelvic ascites  3   Hepatic cirrhosis  I personally discussed this study with [facility] on 3/23/2021 at 1:55 AM                   Workstation performed: [id number]         XR chest 1 view portable   ED Interpretation by Martha Saucedo MD (03/22 7724)   No acute disease read by me  Procedures  ECG 12 Lead Documentation Only    Date/Time: 3/23/2021 12:40 AM  Performed by: Martha Saucedo MD  Authorized by: Martha Saucedo MD     Indications / Diagnosis:  Abdominal pain  ECG reviewed by me, the ED Provider: yes    Patient location:  ED  Previous ECG:     Previous ECG:  Compared to current    Comparison ECG info:  12/12/14    Similarity:  Changes noted (IRBBB now)  Rate:     ECG rate:  78    ECG rate assessment: normal    Rhythm:     Rhythm: sinus rhythm    Ectopy:     Ectopy: none    QRS:     QRS axis:  Normal    QRS intervals:  Normal  Conduction:     Conduction: abnormal      Abnormal conduction: incomplete RBBB    ST segments:     ST segments:  Normal  T waves:     T waves: normal               ED Course  ED Course as of Mar 23 0236   Tue Mar 23, 2021   [address] and CXR d/w patient and pain improving  0144 Pain worsening as per ED RN so more IV dilaudid ordered  2873 Verbal report from radiologist and patient has SBO on CT with transition point in central abdomen and this was d/w patient  MDM  Number of Diagnoses or Management Options  Diagnosis management comments: DDx including but not limited to: stump appendicitis, gastroenteritis, gastritis, PUD, GERD, gastroparesis, hepatitis, pancreatitis, colitis, enteritis, diverticulitis, food poisoning, mesenteric adenitis, mesenteric ischemia, IBD, IBS, ileus, bowel obstruction, volvulus, internal hernia, cholecystitis, biliary colic, choledocholithiasis, perforated viscus, tumor, splenic etiology, constipation, AAA, renal colic, pyelonephritis, UTI; doubt cardiac etiology  Amount and/or Complexity of Data Reviewed  Clinical lab tests: ordered and reviewed  Tests in the radiology section of CPT®: ordered and reviewed  Decide to obtain previous medical records or to obtain history from someone other than the patient: yes  Obtain history from someone other than the patient: yes  Review and summarize past medical records: yes  Discuss the patient with other providers: yes  Independent visualization of images, tracings, or specimens: yes        Disposition  Final diagnoses:   Small bowel obstruction ([address] )   Abdominal pain     Time reflects when diagnosis was documented in both MDM as applicable and the Disposition within this note     Time User Action Codes Description Comment    3/23/2021  1:59 AM Yudy Prather Add [K56 609] Small bowel obstruction ([address] )     3/23/2021  1:59 AM Yudy Prather Add [R10 9] Abdominal pain       ED Disposition     ED Disposition Condition Date/Time Comment    Admit Stable Tue Mar 23, 2021  2:36 AM Case was discussed with surgical resident, Dr Carmelo Goodpasture and the patient's admission status was agreed to be Admission Status: observation status to the service of Dr Mauricio Box   Follow-up Information    None         Patient's Medications   Discharge Prescriptions    No medications on file     No discharge procedures on file      PDMP Review       Value Time User    PDMP Reviewed  Yes 3/22/2021 10:34 PM Daniela Xavier MD          ED Provider  Electronically Signed by           Daniela Xavier MD  03/23/21 3983

## 2021-03-23 NOTE — H&P
H&P Exam - General Surgery   Joe Alamo 61 y o  male MRN: [id number]  Unit/Bed#: FT 04 Encounter: [id number]    Assessment/Plan     Assessment:  60 yo M with PMH of renal stones, HTN, HLD, hep C cirrhosis (Child's A), GERD, Peterson's, Asthma, anxiety, open appendectomy, and hernia repair who now presents with a small bowel obstruction  White cell count 4 54  Lactate 0 6  CT abdomen pelvis with contrast:  Findings consistent with small-bowel obstruction with transition point identified in the central abdomen, small amount of abdominal pelvic ascites, hepatic cirrhosis    Plan:  NPO  NG tube  [email or web address]  P r n  Analgesia  P r n  Antiemetics  DVT prophylaxis  Will trial conservative management    History of Present Illness     HPI:  Jeo Alamo is a 61 y o  male who presents with abdominal pain and nausea  Patient reports that he had diarrhea yesterday  Subsequently started developing central abdominal pain which has been intermittent and gradually worsened over the last day  He reports some nausea but denies vomiting  Has not had a bowel movement or pass flatus since yesterday  He has a history of previous open appendectomy and hernia repair  Review of Systems   Constitutional: Negative  HENT: Negative  Eyes: Negative  Respiratory: Negative  Cardiovascular: Negative  Gastrointestinal: Positive for abdominal pain (Central abdominal pain), constipation, diarrhea and nausea  Negative for vomiting  Genitourinary: Negative  Musculoskeletal: Negative  Skin: Negative  Neurological: Negative  Psychiatric/Behavioral: Negative             Historical Information   Past Medical History:   Diagnosis Date    Anxiety     Asthma     Peterson's esophagus     Chest pain     Last assessed - 7/31/17    Chronic GERD     Last assessed - 3/11/16    COPD (chronic obstructive pulmonary disease) (HCC)     Hep C w/o coma, chronic (HCC)     treated    Hepatic disease     History of neck problems  Hypercholesterolemia     Hypertension     Kidney disease     Kidney stones      Past Surgical History:   Procedure Laterality Date    APPENDECTOMY      ESOPHAGOGASTRODUODENOSCOPY      HERNIA REPAIR      Resolved     CT ESOPHAGOGASTRODUODENOSCOPY TRANSORAL DIAGNOSTIC N/A 11/30/2017    Procedure: ESOPHAGOGASTRODUODENOSCOPY (EGD); Surgeon: Vickie Humphries DO;  Location: [facility]; Service: Gastroenterology     Social History   Social History     Substance and Sexual Activity   Alcohol Use No     Social History     Substance and Sexual Activity   Drug Use Yes    Types: Methamphetamines    Comment: Hx of use, states clean since 1/2014, used meth this am 3/22/21     Social History     Tobacco Use   Smoking Status Current Every Day Smoker    Packs/day: 1 00    Types: Cigarettes   Smokeless Tobacco Current User   Tobacco Comment    since 14 yrs old, Smokes less than 1 pack per day  per [employer]      E-Cigarette/Vaping    E-Cigarette Use Never User      E-Cigarette/Vaping Substances     Family History:   Family History   Problem Relation Age of Onset    Hyperlipidemia Mother     Cancer Mother         Malignant Neoplasm     Heart attack Father     Lung cancer Maternal Grandmother     Lung cancer Maternal Grandfather     Heart attack Maternal Grandfather     No Known Problems Paternal Grandmother     No Known Problems Paternal Grandfather     Other Other         Cardiac Disorder    Stroke Other        Meds/Allergies   PTA meds:   Prior to Admission Medications   Prescriptions Last Dose Informant Patient Reported? Taking?    Blood Pressure KIT   No No   Sig: by Does not apply route daily   albuterol (Ventolin HFA) 90 mcg/act inhaler   No No   Sig: Inhale 1 puff every 6 (six) hours as needed for wheezing or shortness of breath   atorvastatin (LIPITOR) 20 mg tablet   No No   Sig: Take 1 tablet (20 mg total) by mouth daily   cyclobenzaprine (FLEXERIL) 10 mg tablet   No No   Sig: Take 1 tablet (10 mg total) by mouth 2 (two) times a day as needed for muscle spasms   fluticasone (FLONASE) 50 mcg/act nasal spray   No No   Si spray into each nostril daily   lansoprazole (PREVACID) 15 mg capsule   No No   Sig: take 1 capsule by mouth once daily   lisinopril (ZESTRIL) 10 mg tablet   No No   Sig: Take 1 tablet (10 mg total) by mouth daily   loratadine (CLARITIN) 10 mg tablet   No No   Sig: take 1 tablet by mouth once daily   naproxen (NAPROSYN) 500 mg tablet   No No   Sig: Take 1 tablet (500 mg total) by mouth 2 (two) times a day with meals      Facility-Administered Medications: None     Allergies   Allergen Reactions    Morphine Swelling       Objective   First Vitals:   Blood Pressure: 161/78 (21)  Pulse: 68 (21)  Temperature: 98 7 °F (37 1 °C) (21)  Temp Source: Oral (21)  Respirations: 16 (21)  Weight - Scale: 101 kg (222 lb 10 6 oz) (21)  SpO2: 96 % (21)    Current Vitals:   Blood Pressure: 161/78 (21)  Pulse: 68 (21)  Temperature: 98 7 °F (37 1 °C) (21)  Temp Source: Oral (21)  Respirations: 16 (21)  Weight - Scale: 101 kg (222 lb 10 6 oz) (21)  SpO2: 96 % (21)      Intake/Output Summary (Last 24 hours) at 3/23/2021 0220  Last data filed at 3/22/2021 2345  Gross per 24 hour   Intake 1000 ml   Output --   Net 1000 ml       Invasive Devices     Peripheral Intravenous Line            Peripheral IV 11/15/19 Right Forearm 493 days    Peripheral IV 21 Right Forearm less than 1 day                Physical Exam  Constitutional:       Appearance: Normal appearance  HENT:      Head: Normocephalic and atraumatic  Right Ear: External ear normal       Left Ear: External ear normal       Nose: Nose normal       Mouth/Throat:      Mouth: Mucous membranes are moist       Pharynx: Oropharynx is clear     Eyes:      Extraocular Movements: Extraocular movements intact  Conjunctiva/sclera: Conjunctivae normal       Pupils: Pupils are equal, round, and reactive to light  Neck:      Musculoskeletal: Normal range of motion  Cardiovascular:      Rate and Rhythm: Normal rate and regular rhythm  Pulses: Normal pulses  Pulmonary:      Effort: Pulmonary effort is normal    Abdominal:      General: Abdomen is flat  There is distension (Mildly distended)  Palpations: Abdomen is soft  Tenderness: There is abdominal tenderness (Tender to palpation diffusely, but worst in periumbilical area)  Musculoskeletal: Normal range of motion  Skin:     General: Skin is warm and dry  Neurological:      General: No focal deficit present  Mental Status: He is alert and oriented to person, place, and time  Psychiatric:         Mood and Affect: Mood normal          Behavior: Behavior normal             Lab Results: I have personally reviewed pertinent lab results  Imaging: I have personally reviewed pertinent reports  EKG, Pathology, and Other Studies: I have personally reviewed pertinent reports        Code Status: No Order  Advance Directive and Living Will:      Power of :    POLST:

## 2021-03-23 NOTE — ED NOTES
During NG tube placement pt ripped tube out and refused placement  Provider made aware       Semaj Rosas RN  03/23/21 0363

## 2021-03-24 ENCOUNTER — TELEPHONE (OUTPATIENT)
Dept: RADIOLOGY | Facility: HOSPITAL | Age: 56
End: 2021-03-24

## 2021-03-24 ENCOUNTER — APPOINTMENT (OUTPATIENT)
Dept: RADIOLOGY | Facility: HOSPITAL | Age: 56
DRG: 247 | End: 2021-03-24
Payer: COMMERCIAL

## 2021-03-24 PROBLEM — K56.50 SMALL BOWEL OBSTRUCTION DUE TO ADHESIONS (HCC): Status: ACTIVE | Noted: 2021-03-24

## 2021-03-24 LAB
ANION GAP SERPL CALCULATED.3IONS-SCNC: 10 MMOL/L (ref 4–13)
ATRIAL RATE: 78 BPM
BUN SERPL-MCNC: 14 MG/DL (ref 5–25)
CALCIUM SERPL-MCNC: 8.5 MG/DL (ref 8.3–10.1)
CHLORIDE SERPL-SCNC: 107 MMOL/L (ref 100–108)
CO2 SERPL-SCNC: 24 MMOL/L (ref 21–32)
CREAT SERPL-MCNC: 0.98 MG/DL (ref 0.6–1.3)
ERYTHROCYTE [DISTWIDTH] IN BLOOD BY AUTOMATED COUNT: 13.6 % (ref 11.6–15.1)
GFR SERPL CREATININE-BSD FRML MDRD: 85 ML/MIN/1.73SQ M
GLUCOSE P FAST SERPL-MCNC: 92 MG/DL (ref 65–99)
GLUCOSE SERPL-MCNC: 92 MG/DL (ref 65–140)
HCT VFR BLD AUTO: 44.6 % (ref 36.5–49.3)
HGB BLD-MCNC: 14.2 G/DL (ref 12–17)
MAGNESIUM SERPL-MCNC: 2.1 MG/DL (ref 1.6–2.6)
MCH RBC QN AUTO: 30.7 PG (ref 26.8–34.3)
MCHC RBC AUTO-ENTMCNC: 31.8 G/DL (ref 31.4–37.4)
MCV RBC AUTO: 97 FL (ref 82–98)
P AXIS: 64 DEGREES
PHOSPHATE SERPL-MCNC: 2.5 MG/DL (ref 2.7–4.5)
PLATELET # BLD AUTO: 126 THOUSANDS/UL (ref 149–390)
PMV BLD AUTO: 10.3 FL (ref 8.9–12.7)
POTASSIUM SERPL-SCNC: 3.6 MMOL/L (ref 3.5–5.3)
PR INTERVAL: 148 MS
QRS AXIS: 77 DEGREES
QRSD INTERVAL: 98 MS
QT INTERVAL: 394 MS
QTC INTERVAL: 449 MS
RBC # BLD AUTO: 4.62 MILLION/UL (ref 3.88–5.62)
SODIUM SERPL-SCNC: 141 MMOL/L (ref 136–145)
T WAVE AXIS: 69 DEGREES
VENTRICULAR RATE: 78 BPM
WBC # BLD AUTO: 5.87 THOUSAND/UL (ref 4.31–10.16)

## 2021-03-24 PROCEDURE — 99232 SBSQ HOSP IP/OBS MODERATE 35: CPT | Performed by: SURGERY

## 2021-03-24 PROCEDURE — 85027 COMPLETE CBC AUTOMATED: CPT | Performed by: SURGERY

## 2021-03-24 PROCEDURE — 83735 ASSAY OF MAGNESIUM: CPT | Performed by: SURGERY

## 2021-03-24 PROCEDURE — NC001 PR NO CHARGE: Performed by: SURGERY

## 2021-03-24 PROCEDURE — 80048 BASIC METABOLIC PNL TOTAL CA: CPT | Performed by: SURGERY

## 2021-03-24 PROCEDURE — 74022 RADEX COMPL AQT ABD SERIES: CPT

## 2021-03-24 PROCEDURE — 93010 ELECTROCARDIOGRAM REPORT: CPT | Performed by: INTERNAL MEDICINE

## 2021-03-24 PROCEDURE — 84100 ASSAY OF PHOSPHORUS: CPT | Performed by: SURGERY

## 2021-03-24 PROCEDURE — C9113 INJ PANTOPRAZOLE SODIUM, VIA: HCPCS | Performed by: SURGERY

## 2021-03-24 RX ADMIN — HYDROMORPHONE HYDROCHLORIDE 0.5 MG: 1 INJECTION, SOLUTION INTRAMUSCULAR; INTRAVENOUS; SUBCUTANEOUS at 19:19

## 2021-03-24 RX ADMIN — NICOTINE 1 PATCH: 21 PATCH, EXTENDED RELEASE TRANSDERMAL at 08:43

## 2021-03-24 RX ADMIN — HYDROMORPHONE HYDROCHLORIDE 0.5 MG: 1 INJECTION, SOLUTION INTRAMUSCULAR; INTRAVENOUS; SUBCUTANEOUS at 06:00

## 2021-03-24 RX ADMIN — PANTOPRAZOLE SODIUM 40 MG: 40 INJECTION, POWDER, FOR SOLUTION INTRAVENOUS at 08:43

## 2021-03-24 RX ADMIN — HEPARIN SODIUM 5000 UNITS: 5000 INJECTION INTRAVENOUS; SUBCUTANEOUS at 05:49

## 2021-03-24 RX ADMIN — SODIUM CHLORIDE 125 ML/HR: 0.9 INJECTION, SOLUTION INTRAVENOUS at 11:16

## 2021-03-24 NOTE — UTILIZATION REVIEW
Initial Clinical Review  Admission Orders (From admission, onward)     Ordered        03/24/21 2241  Inpatient Admission  Once         03/23/21 0233  Place in Observation  Once                   3/24  CHANGED TO INPATIENT  Status, MS level of care  due to unresolved SBO requiring ongoing treatment  Admission:   Inpatient Admission Once     Question Answer   Level of Care Med Surg   Estimated length of stay More than 2 Midnights   Certification I certify that inpatient services are medically necessary for this patient for a duration of greater than two midnights  See H&P and MD Progress Notes for additional information about the patient's course of treatment  ED Arrival Information     Expected Arrival Acuity Means of Arrival Escorted By Service Admission Type    - 3/22/2021 22:33 Urgent Ambulance [facility] General Medicine Urgent    Arrival Complaint    abdominal pain        Chief Complaint   Patient presents with    Abdominal Pain     started at 1PM, last BM this morning, diarrhea     Assessment/Plan:    60 yo male, to ER via EMS,  Admitted OBS status, MS level of care for  Management of SBO  Reports worsening intermittent and now constant diffuse abd pain w/nausea and no vomiting  Had diarrhea yesterday and no BM today  No flatus  Has had prior appy and hernia repair  Was last seen in this ED on 2/21/20 for bronchitis    White cell count 4 54   Lactate 0 6   CTAP shows SBO w/transition point identified in the central abd, small amount of abd pelvic ascites, hepatic cirrhosis   Plan:  NPO, NG tube, [email or web address],  IV pain/nausea control,  IV protonix     3/24     Passing flatus  Feeling much better  Will obtain obs series today (Partially improved appearance of SBO)     3/25 @ 0230 -  NGT found dislodged/nearly pulled out  Physician completed removal; pt refused  re-insertion  Prior to removal had 1100cc brown liquid output but suspect had also been taking some ice chips     Will leave out for now      3/25 @  0630   Pt w/o N/V but still no bm  Will HOLD OFF on re-insertion of ngt:  Try clear liquids   Change IVF from NS to D5 1/2 NS w/KCL  @  125 cc/hr   Give  K Phos 9 mmol  IV x1       ED Triage Vitals   Temperature Pulse Respirations Blood Pressure SpO2   03/22/21 2244 03/22/21 2244 03/22/21 2244 03/22/21 2244 03/22/21 2244   98 7 °F (37 1 °C) 68 16 161/78 96 %      Temp Source Heart Rate Source Patient Position - Orthostatic VS BP Location FiO2 (%)   03/22/21 2244 03/22/21 2244 03/22/21 2244 03/23/21 0805 --   Oral Monitor Lying Right arm       Pain Score       03/22/21 2331       Worst Possible Pain          Wt Readings from Last 1 Encounters:   03/24/21 80 9 kg (178 lb 5 6 oz)     Additional Vital Signs:  03/24/21 07:20:30  99 2 °F (37 3 °C)  85  15  156/92  113  93 %  --  --   03/23/21 20:08:04  98 °F (36 7 °C)  81  18  151/95  114  93 %           Pertinent Labs/Diagnostic Test Results:   3/22  ekg -  nsr , incomplete RBBB    3/24  @  1700 -  OBS series   1  No free air   2  Partially improved appearance of small bowel obstruction   3  Nasogastric tube side port in the distal esophagus, with advancement  recommended for  placement below the GE junction       3/23  CTAP - 1   Findings consistent with small bowel obstruction with transition point identified in the central abdomen   Surgical consultation is recommended  2   Small amount of abdominopelvic ascites  3   Hepatic cirrhosis       3/23  cxr - nad        Results from last 7 days   Lab Units 03/24/21  0538 03/23/21  0408 03/22/21  2338   WBC Thousand/uL 5 87 4 57 4 54   HEMOGLOBIN g/dL 14 2 14 6 14 0   HEMATOCRIT % 44 6 44 5 42 0   PLATELETS Thousands/uL 126* 131* 128*   NEUTROS ABS Thousands/µL  --  3 28 3 26         Results from last 7 days   Lab Units 03/24/21  0538 03/23/21  1117 03/23/21  0416 03/22/21  2329   SODIUM mmol/L 141 139 138 142   POTASSIUM mmol/L 3 6 5 7* 5 8* 3 3*   CHLORIDE mmol/L 107 108 106 106   CO2 mmol/L 24 23 25 26   ANION GAP mmol/L 10 8 7 10   BUN mg/dL 14 11 11 12   CREATININE mg/dL 0 98 0 78 0 88 1 01   EGFR ml/min/1 73sq m 85 100 95 82   CALCIUM mg/dL 8 5 9 1 8 9 9 2   MAGNESIUM mg/dL 2 1  --   --   --    PHOSPHORUS mg/dL 2 5*  --   --   --      Results from last 7 days   Lab Units 03/23/21  0416 03/22/21  2329   AST U/L 31 22   ALT U/L 28 29   ALK PHOS U/L 62 63   TOTAL PROTEIN g/dL 7 0 6 9   ALBUMIN g/dL 3 6 3 8   TOTAL BILIRUBIN mg/dL 0 64 0 50         Results from last 7 days   Lab Units 03/24/21  0538 03/23/21  1117 03/23/21  0416 03/22/21  2329   GLUCOSE RANDOM mg/dL 92 109 120 107     Results from last 7 days   Lab Units 03/22/21  2336   PROTIME seconds 14 0   INR  1 07   PTT seconds 29         Results from last 7 days   Lab Units 03/22/21  2329   LACTIC ACID mmol/L 0 6     Results from last 7 days   Lab Units 03/22/21  2329   LIPASE u/L 34*             Results from last 7 days   Lab Units 03/23/21  0129   CLARITY UA  Slightly Cloudy   COLOR UA  Light Yellow   SPEC GRAV UA  1 020   PH UA  7 5   GLUCOSE UA mg/dl Negative   KETONES UA mg/dl 15 (1+)*   BLOOD UA  Negative   PROTEIN UA mg/dl Negative   NITRITE UA  Negative   BILIRUBIN UA  Negative   UROBILINOGEN UA E U /dl 0 2   LEUKOCYTES UA  Negative     ED Treatment:   Medication Administration from 03/22/2021 2233 to 03/23/2021 2003       Date/Time Order Dose Route Action     03/22/2021 2245 sodium chloride 0 9 % bolus 1,000 mL 1,000 mL Intravenous New Bag     03/23/2021 1119 sodium chloride 0 9 % infusion 125 mL/hr Intravenous New Bag     03/22/2021 2245 sodium chloride 0 9 % infusion 125 mL/hr Intravenous New Bag     03/22/2021 2331 HYDROmorphone (DILAUDID) injection 0 5 mg 0 5 mg Intravenous Given     03/23/2021 0154 HYDROmorphone (DILAUDID) injection 0 5 mg 0 5 mg Intravenous Given     03/23/2021 0945 nicotine (NICODERM CQ) 21 mg/24 hr TD 24 hr patch 1 patch 1 patch Transdermal Medication Applied     03/23/2021 1301 heparin (porcine) subcutaneous injection 5,000 Units 5,000 Units Subcutaneous Given     03/23/2021 0938 heparin (porcine) subcutaneous injection 5,000 Units 5,000 Units Subcutaneous Given     03/23/2021 1300 HYDROmorphone (DILAUDID) injection 0 2 mg 0 2 mg Intravenous Given     03/23/2021 0951 HYDROmorphone (DILAUDID) injection 0 2 mg 0 2 mg Intravenous Given     03/23/2021 0307 potassium chloride 20 mEq IVPB (premix) 20 mEq Intravenous New Bag     03/23/2021 1132 pantoprazole (PROTONIX) injection 40 mg 40 mg Intravenous Given        Past Medical History:   Diagnosis Date    Anxiety     Asthma     Peterson's esophagus     Chest pain     Last assessed - 7/31/17    Chronic GERD     Last assessed - 3/11/16    COPD (chronic obstructive pulmonary disease) ([facility] [address] )     Hep C w/o coma, chronic (HCC)     treated    Hepatic disease     History of neck problems     Hypercholesterolemia     Hypertension     Kidney disease     Kidney stones      Present on Admission:   Small bowel obstruction due to adhesions [facility])    Admitting Diagnosis: [facility] [K56 609]  Abdominal pain [R10 9]  Age/Sex: 61 y o  male  3/21   Admission Orders:    Ng tube to suction;  I/O q shift;  Npo    Scheduled Medications:  heparin (porcine), 5,000 Units, Subcutaneous, Q8H [address]  nicotine, 1 patch, Transdermal, Daily  pantoprazole, 40 mg, Intravenous, Q24H [address]    Continuous IV Infusions:  sodium chloride, 125 mL/hr, Intravenous, Continuous      PRN Meds:  3/23  @  2115  IV dilaudid   3/24  @  0600  IV dilaudid            @ 1919   IV dilaudid    albuterol, 1 puff, Inhalation, Q6H PRN  HYDROmorphone, 0 2 mg, Intravenous, Q3H PRN  HYDROmorphone, 0 5 mg, Intravenous, Q3H PRN  HYDROmorphone, 0 5 mg, Intravenous, Q3H PRN  ondansetron, 4 mg, Intravenous, Q4H PRN  phenol, 1 spray, Mouth/Throat, Q2H PRN      Network Utilization Review Department  ATTENTION: Please call with any questions or concerns to [phone] and carefully listen to the prompts so that you are directed to the right person  All voicemails are confidential   Carl Johnson all requests for admission clinical reviews, approved or denied determinations and any other requests to dedicated fax number below belonging to the campus where the patient is receiving treatment   List of dedicated fax numbers for the Facilities:  [address] DENIALS (Administrative/Medical Necessity) [phone]   [address] (Maternity/NICU/Pediatrics) [phone]   [address] Camille Rios 8368 (  Debra Menjivar [facility]) [address] [place] [address] Minerva Spencer 14[address] [place] [address] [phone]

## 2021-03-24 NOTE — PROGRESS NOTES
Progress Note - General Surgery   Yesenia Bailey 61 y o  male MRN: [id number]  Unit/Bed#: W -01 Encounter: [id number]        Subjective/Objective     Subjective:  Passing flatus  Feeling much better  Blood pressure 156/92, pulse 85, temperature 99 2 °F (37 3 °C), resp  rate 15, height 6' 2" (1 88 m), weight 80 9 kg (178 lb 5 6 oz), SpO2 93 %  ,Body mass index is 22 9 kg/m²  Intake/Output Summary (Last 24 hours) at 3/24/2021 0828  Last data filed at 3/24/2021 0746  Gross per 24 hour   Intake 4255 ml   Output 2220 ml   Net 2035 ml           Physical Exam:   Abdomen much less distended  Soft nontender          Assessment:  Small bowel obstruction due to adhesion  History of cirrhosis  COPD    Plan: Will obtain obstruction series today    Given clinical improvement hopefully we can remove his NG tube and start clears    Juice Steinberg DO  3/24/2021  8:28 AM

## 2021-03-24 NOTE — PLAN OF CARE
Problem: Potential for Falls  Goal: Patient will remain free of falls  Description: INTERVENTIONS:  - Assess patient frequently for physical needs  -  Identify cognitive and physical deficits and behaviors that affect risk of falls  -  [place] fall precautions as indicated by assessment   - Educate patient/family on patient safety including physical limitations  - Instruct patient to call for assistance with activity based on assessment  - Modify environment to reduce risk of injury  - Consider OT/PT consult to assist with strengthening/mobility  Outcome: Progressing     Problem: Nutrition/Hydration-ADULT  Goal: Nutrient/Hydration intake appropriate for improving, restoring or maintaining nutritional needs  Description: Monitor and assess patient's nutrition/hydration status for malnutrition  Collaborate with interdisciplinary team and initiate plan and interventions as ordered  Monitor patient's weight and dietary intake as ordered or per policy  Utilize nutrition screening tool and intervene as necessary  Determine patient's food preferences and provide high-protein, high-caloric foods as appropriate       INTERVENTIONS:  - Monitor oral intake, urinary output, labs, and treatment plans  - Assess nutrition and hydration status and recommend course of action  - Evaluate amount of meals eaten  - Assist patient with eating if necessary   - Allow adequate time for meals  - Recommend/ encourage appropriate diets, oral nutritional supplements, and vitamin/mineral supplements  - Order, calculate, and assess calorie counts as needed  - Recommend, monitor, and adjust tube feedings and TPN/PPN based on assessed needs  - Assess need for intravenous fluids  - Provide specific nutrition/hydration education as appropriate  - Include patient/family/caregiver in decisions related to nutrition  Outcome: Progressing     Problem: Potential for Falls  Goal: Patient will remain free of falls  Description: INTERVENTIONS:  - Assess patient frequently for physical needs  -  Identify cognitive and physical deficits and behaviors that affect risk of falls    -  [place] fall precautions as indicated by assessment   - Educate patient/family on patient safety including physical limitations  - Instruct patient to call for assistance with activity based on assessment  - Modify environment to reduce risk of injury  - Consider OT/PT consult to assist with strengthening/mobility  Outcome: Progressing

## 2021-03-24 NOTE — PROGRESS NOTES
Progress Note -    Gulshan Morrissey 61 y o  male MRN: [id number]  Unit/Bed#: W -04 Encounter: [id number]    Assessment:  58-yr old male with adhesive SBO  Stable VS in room air  Has the urge to defecate; yet to pass flatus however  NGT: 400 cc in canister; bilious/gastric effluent  White count: 5  PLAN:  - NGT clamp trial; possible removal later today  - can have clears after NGT removal   - PT/OT/ambulate  Subjective:   - pain much improved; denies any nausea  Objective:     Vitals: Temp:  [98 °F (36 7 °C)-99 2 °F (37 3 °C)] 99 2 °F (37 3 °C)  HR:  [66-91] 85  Resp:  [15-18] 15  BP: (146-156)/(85-95) 156/92  Body mass index is 22 9 kg/m²  I/O       03/22 0701 - 03/23 0700 03/23 0701 - 03/24 0700 03/24 0701 - 03/25 0700    P  O   100     I V  (mL/kg)   4125 (51)    NG/GT   30    IV Piggyback 1100      Total Intake(mL/kg) 1100 (10 9) 100 (1 2) 4155 (51 4)    Urine (mL/kg/hr)  600 (0 3)     Emesis/NG output  1070 550    Stool  0 0    Total Output  1670 550    Net +1100 -1570 +3605           Unmeasured Urine Occurrence  0 x     Unmeasured Stool Occurrence  0 x 0 x          Physical Exam:  GEN: NAD  HEENT: atraumatic  NGT in situ  CV: RRR  Lung: Normal effort  Ab: Soft, NT/ND  Extrem: No CCE  Neuro: A+Ox3    Lab, Imaging and other studies:   I have personally reviewed pertinent reports    , CBC with diff:   Lab Results   Component Value Date    WBC 5 87 03/24/2021    HGB 14 2 03/24/2021    HCT 44 6 03/24/2021    MCV 97 03/24/2021     (L) 03/24/2021    MCH 30 7 03/24/2021    MCHC 31 8 03/24/2021    RDW 13 6 03/24/2021    MPV 10 3 03/24/2021   , BMP/CMP:   Lab Results   Component Value Date    SODIUM 141 03/24/2021    K 3 6 03/24/2021     03/24/2021    CO2 24 03/24/2021    BUN 14 03/24/2021    CREATININE 0 98 03/24/2021    CALCIUM 8 5 03/24/2021    EGFR 85 03/24/2021     VTE Pharmacologic Prophylaxis: Heparin  VTE Mechanical Prophylaxis: sequential compression device

## 2021-03-25 VITALS
BODY MASS INDEX: 22.89 KG/M2 | DIASTOLIC BLOOD PRESSURE: 84 MMHG | HEART RATE: 85 BPM | RESPIRATION RATE: 15 BRPM | SYSTOLIC BLOOD PRESSURE: 140 MMHG | WEIGHT: 178.35 LBS | OXYGEN SATURATION: 95 % | HEIGHT: 74 IN | TEMPERATURE: 98.3 F

## 2021-03-25 LAB
ANION GAP SERPL CALCULATED.3IONS-SCNC: 11 MMOL/L (ref 4–13)
BASOPHILS # BLD AUTO: 0.02 THOUSANDS/ΜL (ref 0–0.1)
BASOPHILS NFR BLD AUTO: 0 % (ref 0–1)
BUN SERPL-MCNC: 13 MG/DL (ref 5–25)
CALCIUM SERPL-MCNC: 8.5 MG/DL (ref 8.3–10.1)
CHLORIDE SERPL-SCNC: 108 MMOL/L (ref 100–108)
CO2 SERPL-SCNC: 23 MMOL/L (ref 21–32)
CREAT SERPL-MCNC: 0.84 MG/DL (ref 0.6–1.3)
EOSINOPHIL # BLD AUTO: 0.02 THOUSAND/ΜL (ref 0–0.61)
EOSINOPHIL NFR BLD AUTO: 0 % (ref 0–6)
ERYTHROCYTE [DISTWIDTH] IN BLOOD BY AUTOMATED COUNT: 13.1 % (ref 11.6–15.1)
GFR SERPL CREATININE-BSD FRML MDRD: 97 ML/MIN/1.73SQ M
GLUCOSE SERPL-MCNC: 87 MG/DL (ref 65–140)
HCT VFR BLD AUTO: 41.4 % (ref 36.5–49.3)
HGB BLD-MCNC: 13.6 G/DL (ref 12–17)
IMM GRANULOCYTES # BLD AUTO: 0.01 THOUSAND/UL (ref 0–0.2)
IMM GRANULOCYTES NFR BLD AUTO: 0 % (ref 0–2)
LYMPHOCYTES # BLD AUTO: 0.86 THOUSANDS/ΜL (ref 0.6–4.47)
LYMPHOCYTES NFR BLD AUTO: 19 % (ref 14–44)
MCH RBC QN AUTO: 31.3 PG (ref 26.8–34.3)
MCHC RBC AUTO-ENTMCNC: 32.9 G/DL (ref 31.4–37.4)
MCV RBC AUTO: 95 FL (ref 82–98)
MONOCYTES # BLD AUTO: 0.4 THOUSAND/ΜL (ref 0.17–1.22)
MONOCYTES NFR BLD AUTO: 9 % (ref 4–12)
NEUTROPHILS # BLD AUTO: 3.17 THOUSANDS/ΜL (ref 1.85–7.62)
NEUTS SEG NFR BLD AUTO: 72 % (ref 43–75)
NRBC BLD AUTO-RTO: 0 /100 WBCS
PLATELET # BLD AUTO: 109 THOUSANDS/UL (ref 149–390)
PMV BLD AUTO: 9.9 FL (ref 8.9–12.7)
POTASSIUM SERPL-SCNC: 3.8 MMOL/L (ref 3.5–5.3)
RBC # BLD AUTO: 4.34 MILLION/UL (ref 3.88–5.62)
SODIUM SERPL-SCNC: 142 MMOL/L (ref 136–145)
WBC # BLD AUTO: 4.48 THOUSAND/UL (ref 4.31–10.16)

## 2021-03-25 PROCEDURE — NC001 PR NO CHARGE: Performed by: PHYSICIAN ASSISTANT

## 2021-03-25 PROCEDURE — 99232 SBSQ HOSP IP/OBS MODERATE 35: CPT | Performed by: SURGERY

## 2021-03-25 PROCEDURE — 85025 COMPLETE CBC W/AUTO DIFF WBC: CPT | Performed by: SURGERY

## 2021-03-25 PROCEDURE — C9113 INJ PANTOPRAZOLE SODIUM, VIA: HCPCS | Performed by: SURGERY

## 2021-03-25 PROCEDURE — 80048 BASIC METABOLIC PNL TOTAL CA: CPT | Performed by: SURGERY

## 2021-03-25 RX ORDER — DEXTROSE, SODIUM CHLORIDE, AND POTASSIUM CHLORIDE 5; .45; .15 G/100ML; G/100ML; G/100ML
75 INJECTION INTRAVENOUS CONTINUOUS
Status: DISCONTINUED | OUTPATIENT
Start: 2021-03-25 | End: 2021-03-25 | Stop reason: HOSPADM

## 2021-03-25 RX ADMIN — DEXTROSE, SODIUM CHLORIDE, AND POTASSIUM CHLORIDE 125 ML/HR: 5; .45; .15 INJECTION INTRAVENOUS at 09:15

## 2021-03-25 RX ADMIN — NICOTINE 1 PATCH: 21 PATCH, EXTENDED RELEASE TRANSDERMAL at 09:16

## 2021-03-25 RX ADMIN — SODIUM CHLORIDE 125 ML/HR: 0.9 INJECTION, SOLUTION INTRAVENOUS at 03:44

## 2021-03-25 RX ADMIN — PANTOPRAZOLE SODIUM 40 MG: 40 INJECTION, POWDER, FOR SOLUTION INTRAVENOUS at 09:16

## 2021-03-25 RX ADMIN — POTASSIUM PHOSPHATE, MONOBASIC AND POTASSIUM PHOSPHATE, DIBASIC 9 MMOL: 224; 236 INJECTION, SOLUTION, CONCENTRATE INTRAVENOUS at 06:49

## 2021-03-25 NOTE — PLAN OF CARE
Problem: Potential for Falls  Goal: Patient will remain free of falls  Description: INTERVENTIONS:  - Assess patient frequently for physical needs  -  Identify cognitive and physical deficits and behaviors that affect risk of falls  -  [place] fall precautions as indicated by assessment   - Educate patient/family on patient safety including physical limitations  - Instruct patient to call for assistance with activity based on assessment  - Modify environment to reduce risk of injury  - Consider OT/PT consult to assist with strengthening/mobility  Outcome: Progressing     Problem: Nutrition/Hydration-ADULT  Goal: Nutrient/Hydration intake appropriate for improving, restoring or maintaining nutritional needs  Description: Monitor and assess patient's nutrition/hydration status for malnutrition  Collaborate with interdisciplinary team and initiate plan and interventions as ordered  Monitor patient's weight and dietary intake as ordered or per policy  Utilize nutrition screening tool and intervene as necessary  Determine patient's food preferences and provide high-protein, high-caloric foods as appropriate       INTERVENTIONS:  - Monitor oral intake, urinary output, labs, and treatment plans  - Assess nutrition and hydration status and recommend course of action  - Evaluate amount of meals eaten  - Assist patient with eating if necessary   - Allow adequate time for meals  - Recommend/ encourage appropriate diets, oral nutritional supplements, and vitamin/mineral supplements  - Order, calculate, and assess calorie counts as needed  - Recommend, monitor, and adjust tube feedings and TPN/PPN based on assessed needs  - Assess need for intravenous fluids  - Provide specific nutrition/hydration education as appropriate  - Include patient/family/caregiver in decisions related to nutrition  Outcome: Progressing     Problem: PAIN - ADULT  Goal: Verbalizes/displays adequate comfort level or baseline comfort level  Description: Interventions:  - Encourage patient to monitor pain and request assistance  - Assess pain using appropriate pain scale  - Administer analgesics based on type and severity of pain and evaluate response  - Implement non-pharmacological measures as appropriate and evaluate response  - Consider cultural and social influences on pain and pain management  - Notify physician/advanced practitioner if interventions unsuccessful or patient reports new pain  Outcome: Progressing     Problem: INFECTION - ADULT  Goal: Absence or prevention of progression during hospitalization  Description: INTERVENTIONS:  - Assess and monitor for signs and symptoms of infection  - Monitor lab/diagnostic results  - Monitor all insertion sites, i e  indwelling lines, tubes, and drains  - Monitor endotracheal if appropriate and nasal secretions for changes in amount and color  - [place] appropriate cooling/warming therapies per order  - Administer medications as ordered  - Instruct and encourage patient and family to use good hand hygiene technique  - Identify and instruct in appropriate isolation precautions for identified infection/condition  Outcome: Progressing     Problem: SAFETY ADULT  Goal: Patient will remain free of falls  Description: INTERVENTIONS:  - Assess patient frequently for physical needs  -  Identify cognitive and physical deficits and behaviors that affect risk of falls    -  [place] fall precautions as indicated by assessment   - Educate patient/family on patient safety including physical limitations  - Instruct patient to call for assistance with activity based on assessment  - Modify environment to reduce risk of injury  - Consider OT/PT consult to assist with strengthening/mobility  Outcome: Progressing     Problem: DISCHARGE PLANNING  Goal: Discharge to home or other facility with appropriate resources  Description: INTERVENTIONS:  - Identify barriers to discharge w/patient and caregiver  - Arrange for needed discharge resources and transportation as appropriate  - Identify discharge learning needs (meds, wound care, etc )  - Arrange for interpretive services to assist at discharge as needed  - Refer to Case Management Department for coordinating discharge planning if the patient needs post-hospital services based on physician/advanced practitioner order or complex needs related to functional status, cognitive ability, or social support system  Outcome: Progressing     Problem: Knowledge Deficit  Goal: Patient/family/caregiver demonstrates understanding of disease process, treatment plan, medications, and discharge instructions  Description: Complete learning assessment and assess knowledge base    Interventions:  - Provide teaching at level of understanding  - Provide teaching via preferred learning methods  Outcome: Progressing

## 2021-03-25 NOTE — UTILIZATION REVIEW
Notification of Inpatient Admission/Inpatient Authorization Request   This is a Notification of Inpatient Admission for [facility]  Be advised that this patient was admitted to our facility under Inpatient Status  Contact Vineet Siddiqui at [phone] for additional admission information  Art KEY DEPT  DEDICATED -[phone]  Patient Name:   Maggie Crawford   YOB: 1965       [address]   [address]:   Marleni Caraballo  Tax ID: [id number]  NPI: [id number] Attending Provider/NPI:  Address:  Phone: Mor Sanchez, Yamilex Coleman [[id number]]  Same as the facility  [phone]   Place of Service Code: 24 Place of Service Name:  [address] [place] [address]   Start Date: 3/24/21 2241     Discharge Date & Time: No discharge date for patient encounter  Type of Admission: Inpatient Status Discharge Disposition   (if discharged): Home/Self Care   Patient Diagnoses: Small bowel obstruction ([address] ) [B50 536]  Abdominal pain [R10 9]     Orders: Admission Orders (From admission, onward)     Ordered        03/24/21 2241  Inpatient Admission  Once         03/23/21 0233  Place in Observation  Once                    Assigned Utilization Review Contact: Vineet Siddiqui  Utilization   Network Utilization Review Department  Phone: [phone]; Fax [phone]  Email: [email or web address]   ATTENTION PAYERS: Please call the assigned Utilization  directly with any questions or concerns ALL voicemails in the department are confidential  Send all requests for admission clinical reviews, approved or denied determinations and any other requests to dedicated fax number belonging to the campus where the patient is receiving treatment

## 2021-03-25 NOTE — PROGRESS NOTES
Progress Note - General Surgery   Devin Garcia 61 y o  male MRN: [id number]  Unit/Bed#: W -01 Encounter: [id number]    Assessment:  58-yr old male with adhesive SBO    VS WNL  WCC pending (frrom 5 87)  NGT pulled out overnight, refusing re-insertion (prior to removal had 1100cc brown liquid output but suspect had also been taking some ice chips)    Plan: Will hold off on reinsertion of NG tube given patient is passing flatus and feels he will have a bowel movement soon, abdominal exam also significantly improved  Can try on clear liquid diet this morning  Switch to maintenance IV fluids at 125 cc/hour  Trend white cell count/fever curve  P r n  Analgesia  P r n  Antiemetics  DVT prophylaxis    Subjective/Objective     Subjective:  Called by nursing staff overnight due to concern with NG tube, patient evaluated bedside found to have NG tube nearly completely pulled out, 1 attempt made at re-insertion which was unsuccessful, patient subsequently refusing further attempts at reinsertion, NG tube removed at that time given likely located in the pharynx and nonfunctional, patient denies nausea or vomiting, denies pain, passing some flatus but still has not had a bowel movement    Objective:     Blood pressure 135/94, pulse 94, temperature 97 6 °F (36 4 °C), resp  rate 15, height 6' 2" (1 88 m), weight 80 9 kg (178 lb 5 6 oz), SpO2 94 %  ,Body mass index is 22 9 kg/m²        Intake/Output Summary (Last 24 hours) at 3/25/2021 0616  Last data filed at 3/25/2021 0246  Gross per 24 hour   Intake 6529 ml   Output 1425 ml   Net 5104 ml       Invasive Devices     Peripheral Intravenous Line            Peripheral IV 03/22/21 Right Forearm 2 days                Physical Exam:  General: AAO x 3, NAD  HEENT: Normocephalic, atraumatic, conjunctiva normal, EOMI, PERRLA  Neck: No JVD, No LAD  Cardio: RRR  Resp: NWB on RA  Abd: Soft, nontender, nondistended, No guarding, no rebound  Neuro: Sensation and motor intact x 4 limbs  Extremities: WWP, No edema  Skin: Warm and dry      Lab, Imaging and other studies:I have personally reviewed pertinent lab results      VTE Pharmacologic Prophylaxis: Heparin  VTE Mechanical Prophylaxis: sequential compression device

## 2021-03-25 NOTE — PLAN OF CARE
Problem: Potential for Falls  Goal: Patient will remain free of falls  Description: INTERVENTIONS:  - Assess patient frequently for physical needs  -  Identify cognitive and physical deficits and behaviors that affect risk of falls  -  [place] fall precautions as indicated by assessment   - Educate patient/family on patient safety including physical limitations  - Instruct patient to call for assistance with activity based on assessment  - Modify environment to reduce risk of injury  - Consider OT/PT consult to assist with strengthening/mobility  Outcome: Progressing     Problem: Nutrition/Hydration-ADULT  Goal: Nutrient/Hydration intake appropriate for improving, restoring or maintaining nutritional needs  Description: Monitor and assess patient's nutrition/hydration status for malnutrition  Collaborate with interdisciplinary team and initiate plan and interventions as ordered  Monitor patient's weight and dietary intake as ordered or per policy  Utilize nutrition screening tool and intervene as necessary  Determine patient's food preferences and provide high-protein, high-caloric foods as appropriate       INTERVENTIONS:  - Monitor oral intake, urinary output, labs, and treatment plans  - Assess nutrition and hydration status and recommend course of action  - Evaluate amount of meals eaten  - Assist patient with eating if necessary   - Allow adequate time for meals  - Recommend/ encourage appropriate diets, oral nutritional supplements, and vitamin/mineral supplements  - Order, calculate, and assess calorie counts as needed  - Recommend, monitor, and adjust tube feedings and TPN/PPN based on assessed needs  - Assess need for intravenous fluids  - Provide specific nutrition/hydration education as appropriate  - Include patient/family/caregiver in decisions related to nutrition  Outcome: Progressing     Problem: PAIN - ADULT  Goal: Verbalizes/displays adequate comfort level or baseline comfort level  Description: Interventions:  - Encourage patient to monitor pain and request assistance  - Assess pain using appropriate pain scale  - Administer analgesics based on type and severity of pain and evaluate response  - Implement non-pharmacological measures as appropriate and evaluate response  - Consider cultural and social influences on pain and pain management  - Notify physician/advanced practitioner if interventions unsuccessful or patient reports new pain  Outcome: Progressing     Problem: INFECTION - ADULT  Goal: Absence or prevention of progression during hospitalization  Description: INTERVENTIONS:  - Assess and monitor for signs and symptoms of infection  - Monitor lab/diagnostic results  - Monitor all insertion sites, i e  indwelling lines, tubes, and drains  - Monitor endotracheal if appropriate and nasal secretions for changes in amount and color  - [place] appropriate cooling/warming therapies per order  - Administer medications as ordered  - Instruct and encourage patient and family to use good hand hygiene technique  - Identify and instruct in appropriate isolation precautions for identified infection/condition  Outcome: Progressing     Problem: SAFETY ADULT  Goal: Patient will remain free of falls  Description: INTERVENTIONS:  - Assess patient frequently for physical needs  -  Identify cognitive and physical deficits and behaviors that affect risk of falls    -  [place] fall precautions as indicated by assessment   - Educate patient/family on patient safety including physical limitations  - Instruct patient to call for assistance with activity based on assessment  - Modify environment to reduce risk of injury  - Consider OT/PT consult to assist with strengthening/mobility  Outcome: Progressing     Problem: DISCHARGE PLANNING  Goal: Discharge to home or other facility with appropriate resources  Description: INTERVENTIONS:  - Identify barriers to discharge w/patient and caregiver  - Arrange for needed discharge resources and transportation as appropriate  - Identify discharge learning needs (meds, wound care, etc )  - Arrange for interpretive services to assist at discharge as needed  - Refer to Case Management Department for coordinating discharge planning if the patient needs post-hospital services based on physician/advanced practitioner order or complex needs related to functional status, cognitive ability, or social support system  Outcome: Progressing     Problem: Knowledge Deficit  Goal: Patient/family/caregiver demonstrates understanding of disease process, treatment plan, medications, and discharge instructions  Description: Complete learning assessment and assess knowledge base    Interventions:  - Provide teaching at level of understanding  - Provide teaching via preferred learning methods  Outcome: Progressing

## 2021-03-25 NOTE — QUICK NOTE
Called by nursing staff due to patient concern about NG tube  Patient requesting that it be removed  Patient assessed at bedside and found to have tube significantly pulled back from previous insertion site with audible suctioning in back of throat  Single attempt made to advance tube without success  Patient now adamantly refusing re-insertion  Given tube is now likely coiled in pharynx/larynx and is non-functional in current position, will remove tube at this time  Patient advised that should he become nauseous, develop vomiting, have increasing distension, or continue to not have a bowel movement, he may require tube re-insertion  He states he will refuse  Risks of foregoing nasogastric decompression reiterated to the patient  Currently less distended than on admission and passing flatus, will continue to monitor without tube for now

## 2021-03-26 NOTE — UTILIZATION REVIEW
Notification of Discharge  This is a Notification of Discharge from our facility [address]  Please be advised that this patient has been discharge from our facility  Below you will find the admission and discharge date and time including the patients disposition  PRESENTATION DATE: 3/22/2021 10:33 PM  OBS ADMISSION DATE: 03-  IP ADMISSION DATE: 3/24/21 2241   DISCHARGE DATE: 3/25/2021  5:48 PM  DISPOSITION: Home/Self Care Home/Self Care   Admission Orders listed below:  Admission Orders (From admission, onward)     Ordered        03/24/21 2241  Inpatient Admission  Once         03/23/21 0233  Place in Observation  Once                   Please contact the UR Department if additional information is required to close this patient's authorization/case  [facility] Utilization Review Department  Main: [phone] x carefully listen to the prompts  All voicemails are confidential   [email or web address]  Send all requests for admission clinical reviews, approved or denied determinations and any other requests to dedicated fax number below belonging to the campus where the patient is receiving treatment   List of dedicated fax numbers:  [address] DENIALS (Administrative/Medical Necessity) [phone]   [address] (Maternity/NICU/Pediatrics) [phone]   Roxie Osei [phone]     [address] [phone]   Ari Saldaña [phone]   [address] [phone]   [facility]  [phone]   [address] [facility] [address]  [address] [facility] 1000 Health system [phone]

## 2021-03-26 NOTE — DISCHARGE SUMMARY
Discharge Summary - Eric Garces 61 y o  male MRN: [id number]    Unit/Bed#: W -01 Encounter: [id number]    Admission Date: 3/22/2021   Discharge Date: 3/25/2021    Admitting Diagnosis:   Small bowel obstruction ([facility] [address] ) [K56 609]  Abdominal pain [R10 9]    Discharge Diagnoses: Principal Problem:    Small bowel obstruction due to adhesions [facility])      Consultations:  N/A    Procedures Performed: N/A    Hospital Course: Eric Garces is a 61 y o  male with PMH of COPD, renal stones/disease, HTN, HLD, hep C cirrhosis (child's A), GERD, Peterson's, asthma, anxiety, and surgical history of open appendectomy and hernia repair who presented to ED on 3/22 with intermittent crampy abdominal pain x 1 day associated with nausea and diarrhea  He was found to be afebrile, VSS, without leukocytosis or elevated LA  A CT was performed revealing SBO with transition point in central abdomen, small amount of abdominopelvic ascites, and hepatic cirrhosis  He was then admitted to surgical service, made NPO/IVF and NGT placed  Throughout the hospital course his pain improved daily, and began passing flatus on HD 2  A obstruction series was performed on 3/24 revealing no free air and partially improved appearance of SBO  His NGT was removed and started on clears  HD 3, patient with BM and doing well without abdominal pain  Patient did request to sign out AMA, decision was made to advance him to soft diet which he tolerated  Denies fever, chills, CP, SOB, or calf tenderness, ambulating and urinating without issues  Discussed with surgical team who are all in agreement that patient is in good condition and ready for discharge  Education was given on when to return to ED/call office  Patient in agreement with plan, all questions answered and concerns addressed  Condition at Discharge: good     Discharge instructions/Information to patient and family:   See after visit summary for information provided to patient and family  Provisions for Follow-Up Care:  See after visit summary for information related to follow-up care and any pertinent home health orders  Disposition: See After Visit Summary for discharge disposition information  Planned Readmission: No    Discharge Statement   I spent 20 minutes discharging the patient  This time was spent on the day of discharge  I had direct contact with the patient on the day of discharge  Additional documentation is required if more than 30 minutes were spent on discharge  Discharge Medications:  See after visit summary for reconciled discharge medications provided to patient and family

## 2021-09-21 ENCOUNTER — OFFICE VISIT (OUTPATIENT)
Dept: DENTISTRY | Facility: CLINIC | Age: 56
End: 2021-09-21

## 2021-09-21 VITALS — TEMPERATURE: 97.6 F | DIASTOLIC BLOOD PRESSURE: 82 MMHG | HEART RATE: 67 BPM | SYSTOLIC BLOOD PRESSURE: 139 MMHG

## 2021-09-21 DIAGNOSIS — K02.61 DENTAL CARIES ON SMOOTH SURFACE LIMITED TO ENAMEL: ICD-10-CM

## 2021-09-21 DIAGNOSIS — K02.9 CARIES: Primary | ICD-10-CM

## 2021-09-21 PROCEDURE — D7140 EXTRACTION, ERUPTED TOOTH OR EXPOSED ROOT (ELEVATION AND/OR FORCEPS REMOVAL): HCPCS | Performed by: DENTIST

## 2021-09-21 PROCEDURE — D0220 INTRAORAL - PERIAPICAL FIRST RADIOGRAPHIC IMAGE: HCPCS | Performed by: DENTIST

## 2021-09-21 PROCEDURE — D0230 INTRAORAL - PERIAPICAL EACH ADDITIONAL RADIOGRAPHIC IMAGE: HCPCS | Performed by: DENTIST

## 2021-09-21 NOTE — PROGRESS NOTES
Emergency    Pt presents for emergency appointment  Pt cc is in constant pain on the left side of his mouth  Pt has been previously approved by insurance for RCT #15 and #19  Pt is not interested in RCT at this time due to the pain experiencing  PA taken and reviewed  It was deemed #15 and #16 are leading him to feel pain  Tx recommended extraction and pt consents to treatment  Discussed with patient that #19 is restorable and the fracture on the lingual may be causing him discomfort  Explained the importance of saving the tooth for chewing efficiency  Tx options offered: 1  Removal of amalgam, core build up, and crown (Possible RCT)  2  Extraction  Pt understands options and will make a decision next appointment  DONE TODAY: Extraction of #15 and #16    Oral Surgery    [address], patient denies any changes  Obtained a direct and personal consent  Risks and complications were explained  Pt agreed and consented  Consent scanned in doc center  Pre-Op BP WNL  Administered 2 carpule of 2 % Lidocaine w/ 1:100,000 epi via infiltration    ? Adequate anesthesia obtained, reflected gingiva, elevated, and extracted #15 and #16   Socket irrigated  Upon dismissal, patient received POI, gauze, and RX: None  Pt is a smoker and explained importance of not smoking after procedure      NV: Extraction/Core-Build up of 19

## 2021-09-27 ENCOUNTER — OFFICE VISIT (OUTPATIENT)
Dept: DENTISTRY | Facility: CLINIC | Age: 56
End: 2021-09-27

## 2021-09-27 VITALS — SYSTOLIC BLOOD PRESSURE: 137 MMHG | TEMPERATURE: 78 F | HEART RATE: 75 BPM | DIASTOLIC BLOOD PRESSURE: 90 MMHG

## 2021-09-27 DIAGNOSIS — K08.89 PAIN, DENTAL: Primary | ICD-10-CM

## 2021-09-27 PROCEDURE — D0140 LIMITED ORAL EVALUATION - PROBLEM FOCUSED: HCPCS | Performed by: DENTIST

## 2021-09-27 RX ORDER — CLINDAMYCIN HYDROCHLORIDE 300 MG/1
300 CAPSULE ORAL 4 TIMES DAILY
Qty: 28 CAPSULE | Refills: 0 | Status: SHIPPED | OUTPATIENT
Start: 2021-09-27 | End: 2021-10-04

## 2021-09-27 NOTE — PROGRESS NOTES
Pt presents for emergency appointment  CC pain and bleeding on/off at extraction site  Medical history significant for cirrhosis of liver and smoking  I/O exam WNL  Healing looks WNL  No signs of infections or dry socket  Explained to patient due to medical history, it may take longer for healing and bleeding on/off is normal  #17 is not occluding in the extraction site  Prescribed Clindamycin to designated pharmacy and pt is aware of prescription  Pt is aware that additional pain may be coming from #17 and #19  Recommended extraction of 1 and 17  RCT on #19 recommended due to functionality of posterior teeth  Pt is still deciding on #19 and will review finances      NV: Extraction of 1 and 17  NVV: Begin RCT on #19/Extraction

## 2022-10-05 ENCOUNTER — HOSPITAL ENCOUNTER (EMERGENCY)
Facility: HOSPITAL | Age: 57
Discharge: HOME | End: 2022-10-06
Attending: EMERGENCY MEDICINE
Payer: MEDICARE

## 2022-10-05 VITALS
HEART RATE: 81 BPM | RESPIRATION RATE: 18 BRPM | SYSTOLIC BLOOD PRESSURE: 164 MMHG | TEMPERATURE: 98.4 F | DIASTOLIC BLOOD PRESSURE: 76 MMHG | OXYGEN SATURATION: 98 %

## 2022-10-05 DIAGNOSIS — H66.92 LEFT OTITIS MEDIA: ICD-10-CM

## 2022-10-05 DIAGNOSIS — H60.92 LEFT OTITIS EXTERNA: Primary | ICD-10-CM

## 2022-10-05 PROCEDURE — 99282 EMERGENCY DEPT VISIT SF MDM: CPT

## 2022-10-06 PROCEDURE — 99284 EMERGENCY DEPT VISIT MOD MDM: CPT

## 2022-10-06 RX ORDER — OXYCODONE HYDROCHLORIDE 5 MG/1
5 TABLET ORAL ONCE
Status: COMPLETED | OUTPATIENT
Start: 2022-10-06 | End: 2022-10-06

## 2022-10-06 RX ORDER — AMOXICILLIN AND CLAVULANATE POTASSIUM 875; 125 MG/1; MG/1
1 TABLET, FILM COATED ORAL EVERY 12 HOURS
Qty: 19 TABLET | Refills: 0 | Status: SHIPPED | OUTPATIENT
Start: 2022-10-06 | End: 2022-10-16

## 2022-10-06 RX ORDER — CIPROFLOXACIN AND DEXAMETHASONE 3; 1 MG/ML; MG/ML
4 SUSPENSION/ DROPS AURICULAR (OTIC) 2 TIMES DAILY
Qty: 7.5 ML | Refills: 0 | Status: SHIPPED | OUTPATIENT
Start: 2022-10-06 | End: 2022-10-13

## 2022-10-06 RX ORDER — IBUPROFEN 600 MG/1
600 TABLET ORAL ONCE
Status: COMPLETED | OUTPATIENT
Start: 2022-10-06 | End: 2022-10-06

## 2022-10-06 RX ORDER — CIPROFLOXACIN AND DEXAMETHASONE 3; 1 MG/ML; MG/ML
4 SUSPENSION/ DROPS AURICULAR (OTIC) ONCE
Status: COMPLETED | OUTPATIENT
Start: 2022-10-06 | End: 2022-10-06

## 2022-10-06 RX ORDER — AMOXICILLIN AND CLAVULANATE POTASSIUM 875; 125 MG/1; MG/1
1 TABLET, FILM COATED ORAL EVERY 12 HOURS
Qty: 19 TABLET | Refills: 0 | Status: SHIPPED | OUTPATIENT
Start: 2022-10-06 | End: 2022-10-06 | Stop reason: SDUPTHER

## 2022-10-06 RX ORDER — AMOXICILLIN AND CLAVULANATE POTASSIUM 875; 125 MG/1; MG/1
1 TABLET, FILM COATED ORAL ONCE
Status: COMPLETED | OUTPATIENT
Start: 2022-10-06 | End: 2022-10-06

## 2022-10-06 RX ORDER — ACETAMINOPHEN 325 MG/1
975 TABLET ORAL ONCE
Status: COMPLETED | OUTPATIENT
Start: 2022-10-06 | End: 2022-10-06

## 2022-10-06 RX ORDER — CIPROFLOXACIN AND DEXAMETHASONE 3; 1 MG/ML; MG/ML
4 SUSPENSION/ DROPS AURICULAR (OTIC) 2 TIMES DAILY
Qty: 7.5 ML | Refills: 0 | Status: SHIPPED | OUTPATIENT
Start: 2022-10-06 | End: 2022-10-06 | Stop reason: SDUPTHER

## 2022-10-06 RX ADMIN — IBUPROFEN 600 MG: 600 TABLET ORAL at 00:35

## 2022-10-06 RX ADMIN — AMOXICILLIN AND CLAVULANATE POTASSIUM 1 TABLET: 875; 125 TABLET, FILM COATED ORAL at 00:35

## 2022-10-06 RX ADMIN — ACETAMINOPHEN 975 MG: 325 TABLET ORAL at 00:35

## 2022-10-06 RX ADMIN — CIPROFLOXACIN AND DEXAMETHASONE 4 DROP: 3; 1 SUSPENSION/ DROPS AURICULAR (OTIC) at 00:35

## 2022-10-06 RX ADMIN — OXYCODONE HYDROCHLORIDE 5 MG: 5 TABLET ORAL at 00:35

## 2022-10-06 NOTE — ED PROVIDER NOTES
History  Chief Complaint   Patient presents with    Earache     Pt had tube placed in left ear months ago but states he has problems with them every since  Pt c/o severe ear pain and drainage  Patient is a 16-year-old male with PMH of HTN and HLD presenting for evaluation of 2 months of left-sided ear pain that is acutely worsening over the last 3 days  He notes placement of an ear tube to to left ear approximately 10 months ago as he has a history of chronic left ear pain, fluid behind the left ear, and chronic left ear infections  He notes over the last 2 months he has daily drainage from the left ear that is yellow to brown and bloody  Today he notes the pain became so severe that he presents for evaluation  He denies associated fevers, chills, headaches, sore throat, cough, CP/SOB, abdominal pain, N/V/D, urinary complaints, and skin complaints  Denies submerging his head under water/recent swimming        History provided by:  Patient   used: No    Earache  Location:  Left  Behind ear:  No abnormality  Quality:  Pressure and throbbing  Severity:  Severe  Duration:  2 months (Acutely worsening over 3 days)  Timing:  Constant  Progression:  Worsening  Chronicity:  Chronic (Chronic pain to left ear)  Context: not direct blow, not elevation change, not foreign body in ear, not loud noise, not recent URI and not water in ear    Relieved by:  Nothing  Worsened by:  Nothing  Ineffective treatments:  OTC medications and palpation  Associated symptoms: ear discharge (Yellow to brown, bloody, thick) and hearing loss (diminished hearing on L side)    Associated symptoms: no abdominal pain, no congestion, no cough, no diarrhea, no fever, no headaches, no neck pain, no rash, no rhinorrhea, no sore throat, no tinnitus and no vomiting    Risk factors: chronic ear infection and prior ear surgery (Left ear tube placement 10 months ago)    Risk factors: no recent travel        Prior to Admission Medications   Prescriptions Last Dose Informant Patient Reported? Taking?   atorvastatin (LIPITOR) 20 mg tablet   No No   Sig: Take 1 tablet (20 mg total) by mouth daily   lansoprazole (PREVACID) 15 mg capsule   No No   Sig: take 1 capsule by mouth once daily   lisinopril (ZESTRIL) 10 mg tablet   No No   Sig: Take 1 tablet (10 mg total) by mouth daily   naproxen (NAPROSYN) 500 mg tablet   No No   Sig: Take 1 tablet (500 mg total) by mouth 2 (two) times a day with meals      Facility-Administered Medications: None       Past Medical History:   Diagnosis Date    Anxiety     Asthma     Peterson's esophagus     Chest pain     Last assessed - 7/31/17    Chronic GERD     Last assessed - 3/11/16    COPD [facility]chronic obstructive pulmonary disease) ([facility] [address] )     Hep C w/o coma, chronic (HCC)     treated    Hepatic disease     History of neck problems     Hypercholesterolemia     Hypertension     Kidney disease     Kidney stones        Past Surgical History:   Procedure Laterality Date    APPENDECTOMY      ESOPHAGOGASTRODUODENOSCOPY      HERNIA REPAIR      Resolved     MI ESOPHAGOGASTRODUODENOSCOPY TRANSORAL DIAGNOSTIC N/A 11/30/2017    Procedure: ESOPHAGOGASTRODUODENOSCOPY (EGD); Surgeon: Ian Arthur DO;  Location: [facility]; Service: Gastroenterology       Family History   Problem Relation Age of Onset    Hyperlipidemia Mother     Cancer Mother         Malignant Neoplasm     Heart attack Father     Lung cancer Maternal Grandmother     Lung cancer Maternal Grandfather     Heart attack Maternal Grandfather     No Known Problems Paternal Grandmother     No Known Problems Paternal Grandfather     Other Other         Cardiac Disorder    Stroke Other      I have reviewed and agree with the history as documented      E-Cigarette/Vaping    E-Cigarette Use Never User      E-Cigarette/Vaping Substances     Social History     Tobacco Use    Smoking status: Current Every Day Smoker     Packs/day: 1 00 Types: Cigarettes    Smokeless tobacco: Current User    Tobacco comment: since 14 yrs old, Smokes less than 1 pack per day  per [employer]    Vaping Use    Vaping Use: Never used   Substance Use Topics    Alcohol use: Not Currently    Drug use: Yes     Types: Methamphetamines     Comment: Hx of use, states clean since 1/2014, used meth this am 3/22/21       Review of Systems   Constitutional: Negative for chills and fever  HENT: Positive for ear discharge (Yellow to brown, bloody, thick), ear pain (L ear) and hearing loss (diminished hearing on L side)  Negative for congestion, facial swelling, rhinorrhea, sore throat, tinnitus and trouble swallowing  Eyes: Negative for pain and visual disturbance  Respiratory: Negative for cough and shortness of breath  Cardiovascular: Negative for chest pain and palpitations  Gastrointestinal: Negative for abdominal pain, diarrhea and vomiting  Genitourinary: Negative for dysuria and hematuria  Musculoskeletal: Negative for arthralgias, back pain and neck pain  Skin: Negative for color change and rash  Neurological: Negative for seizures, syncope and headaches  All other systems reviewed and are negative  Physical Exam  Physical Exam  Vitals and nursing note reviewed  Constitutional:       General: He is awake  He is in acute distress (Evidencing moderate distress)  Appearance: Normal appearance  He is well-developed and normal weight  He is not ill-appearing, toxic-appearing or diaphoretic  HENT:      Head: Normocephalic and atraumatic  Jaw: There is normal jaw occlusion  Right Ear: Tympanic membrane, ear canal and external ear normal  No mastoid tenderness  Tympanic membrane is not perforated, erythematous or bulging  Left Ear: Drainage, swelling and tenderness (tragus tenderness) present  No mastoid tenderness  A PE tube is present  Ears:      Comments: Significant external canal swelling with clear discharge    Ear tube visualized  Unable to evaluate TM given significant otitis externa  Nose: Nose normal  No congestion or rhinorrhea  Mouth/Throat:      Lips: Pink  No lesions  Mouth: Mucous membranes are moist       Pharynx: Oropharynx is clear  No oropharyngeal exudate or posterior oropharyngeal erythema  Eyes:      Extraocular Movements: Extraocular movements intact  Conjunctiva/sclera: Conjunctivae normal    Neck:      Trachea: Trachea and phonation normal  No abnormal tracheal secretions  Cardiovascular:      Rate and Rhythm: Normal rate  Pulses: Normal pulses  Radial pulses are 2+ on the right side and 2+ on the left side  Dorsalis pedis pulses are 2+ on the right side and 2+ on the left side  Heart sounds: Normal heart sounds, S1 normal and S2 normal  No murmur heard  Pulmonary:      Effort: Pulmonary effort is normal  No respiratory distress  Breath sounds: Normal breath sounds  Abdominal:      Palpations: Abdomen is soft  Tenderness: There is no abdominal tenderness  Musculoskeletal:         General: Normal range of motion  Cervical back: Normal range of motion and neck supple  Skin:     General: Skin is warm and dry  Capillary Refill: Capillary refill takes less than 2 seconds  Findings: No rash or wound  Neurological:      General: No focal deficit present  Mental Status: He is alert and oriented to person, place, and time  Mental status is at baseline  Psychiatric:         Behavior: Behavior is cooperative           Vital Signs  ED Triage Vitals   Temperature Pulse Respirations Blood Pressure SpO2   10/05/22 2219 10/05/22 2219 10/05/22 2219 10/05/22 2219 10/05/22 2219   98 4 °F (36 9 °C) 81 18 164/76 98 %      Temp Source Heart Rate Source Patient Position - Orthostatic VS BP Location FiO2 (%)   10/05/22 2219 10/05/22 2219 10/05/22 2219 10/05/22 2219 --   Oral Monitor Sitting Right arm       Pain Score       10/06/22 0035 7           Vitals:    10/05/22 2219   BP: 164/76   Pulse: 81   Patient Position - Orthostatic VS: Sitting         Visual Acuity      ED Medications  Medications   ciprofloxacin-dexamethasone (CIPRODEX) 0 3-0 1 % otic suspension 4 drop (4 drops Left Ear Given 10/6/22 0035)   amoxicillin-clavulanate (AUGMENTIN) 875-125 mg per tablet 1 tablet (1 tablet Oral Given 10/6/22 0035)   acetaminophen (TYLENOL) tablet 975 mg (975 mg Oral Given 10/6/22 0035)   ibuprofen (MOTRIN) tablet 600 mg (600 mg Oral Given 10/6/22 0035)   oxyCODONE (ROXICODONE) IR tablet 5 mg (5 mg Oral Given 10/6/22 0035)       Diagnostic Studies  Results Reviewed     None                 No orders to display              Procedures  Procedures         ED Course  ED Course as of 10/06/22 0221   Thu Oct 06, 2022   0015 Ear wick placed without difficulty                                             MDM  Number of Diagnoses or Management Options  Left otitis externa: new and does not require workup  Left otitis media: new and does not require workup  Diagnosis management comments: DDx including but not limited to: Otitis media, otitis externa, mastoiditis         Amount and/or Complexity of Data Reviewed  Decide to obtain previous medical records or to obtain history from someone other than the patient: yes  Review and summarize past medical records: yes  Discuss the patient with other providers: yes (Dr Jesi Carlos)    Risk of Complications, Morbidity, and/or Mortality  General comments: Assessment:  Patient is a 50-year-old male presenting for evaluation of 2 months of left ear pain acutely worsening over 3 days  His vital signs are stable  His physical exam is without mastoid tenderness, erythema, or swelling  His exam is notable for significant external ear canal swelling with clear, thin discharge  Ear tube is visualized and in place    Plan made with patient to treat with Ciprodex otic drops given significant otitis externa, and to give 10 days of Augmentin to cover for otitis media given limited ability to evaluate TM on exam   Patient given pain medications in the ER for his severe pain, instructed to continue NSAIDs and Tylenol for pain control  Patient encouraged to follow-up with his [facility] and given [address] not work on-call ENT  DC paperwork reviewed with emphasis on new prescriptions including otic drops and oral antibiotics, keeping it straight, abstaining from sticking anything in his ear, no submersion of head under water, and strict return precautions  Patient agreeable to plan and has no questions  His vital signs are stable, he is in no acute distress, nontoxic, ambulatory with steady gait at time of discharge  Patient Progress  Patient progress: stable      Disposition  Final diagnoses:   Left otitis externa   Left otitis media     Time reflects when diagnosis was documented in both MDM as applicable and the Disposition within this note     Time User Action Codes Description Comment    10/6/2022 12:13 AM Eileen Turkey Add [H60 92] Left otitis externa     10/6/2022 12:14 AM Eileen Jaci Add [H66 92] Left otitis media       ED Disposition     ED Disposition   Discharge    Condition   Stable    Date/Time   Thu Oct 6, 2022 12:13 AM    Comment   Delta Jihan discharge to home/self care                 Follow-up Information     Follow up With Specialties Details Why Contact Info Additional [address] Emergency Department Emergency Medicine Go to  If symptoms worsen [address] Emergency Department,  [address], [place], [address] [place] [address] MD [facility] Schedule an appointment as soon as possible for a visit in 1 week  [place] [phone]  [phone]       [facility]  Schedule an appointment as soon as possible for a visit in 1 week  Call your ENT to schedule follow-up appointment in the next week  Discharge Medication List as of 10/6/2022 12:29 AM      CONTINUE these medications which have CHANGED    Details   amoxicillin-clavulanate (AUGMENTIN) 875-125 mg per tablet Take 1 tablet by mouth every 12 (twelve) hours for 10 days, Starting Thu 10/6/2022, Until Sun 10/16/2022, Normal      ciprofloxacin-dexamethasone (CIPRODEX) otic suspension Administer 4 drops into the left ear 2 (two) times a day for 7 days, Starting Thu 10/6/2022, Until Thu 10/13/2022, Normal         CONTINUE these medications which have NOT CHANGED    Details   atorvastatin (LIPITOR) 20 mg tablet Take 1 tablet (20 mg total) by mouth daily, Starting Fri 5/29/2020, Normal      lansoprazole (PREVACID) 15 mg capsule take 1 capsule by mouth once daily, Normal      lisinopril (ZESTRIL) 10 mg tablet Take 1 tablet (10 mg total) by mouth daily, Starting Fri 5/29/2020, Normal      naproxen (NAPROSYN) 500 mg tablet Take 1 tablet (500 mg total) by mouth 2 (two) times a day with meals, Starting Fri 5/29/2020, Normal             No discharge procedures on file      PDMP Review       Value Time User    PDMP Reviewed  Yes 3/25/2021  4:53 PM Pranay Sheikh PA-C          ED Provider  Electronically Signed by           JOS Mckoy  10/06/22 0224

## 2022-10-06 NOTE — DISCHARGE INSTRUCTIONS
Call your ears nose throat doctor to schedule appointment for follow-up in the next week  Take the prescribed Augmentin twice daily for the next 10 days  Use the ear drops as directed  Take Tylenol and/or ibuprofen as needed for pain  Return to the emergency department if he developed fevers, headaches, worsening left ear pain, pain behind the left ear, difficulty swallowing, sore throat, and inability tolerate fluids

## 2022-11-03 NOTE — ED ATTENDING ATTESTATION
11/15/2019  I, Renetta Burgos MD, saw and evaluated the patient  I have discussed the patient with the resident/non-physician practitioner and agree with the resident's/non-physician practitioner's findings, Plan of Care, and MDM as documented in the resident's/non-physician practitioner's note, except where noted  All available labs and Radiology studies were reviewed  I was present for key portions of any procedure(s) performed by the resident/non-physician practitioner and I was immediately available to provide assistance  At this point I agree with the current assessment done in the Emergency Department  I have conducted an independent evaluation of this patient a history and physical is as follows:    ED Course     67-year-old male, history of chronic neck pain secondary to history of multiple fractures with spinal fusion  Patient has been [facility] in the [facility], and has been told that he is not an operative candidate  Patient states that when he is getting worsening neck pain, it is often associated with headache  Patient has had worsening neck pain, had not had Flexeril which he has been prescribed in the past because he has been unable to get in to the [address]  Patient reports that he is having gradual onset worsening headache  No associated change in vision, difficulty speaking or swallowing, weakness in the upper lower extremities  Ten systems reviewed negative except as noted in the history of present illness  The patient is resting comfortably on a stretcher in no acute respiratory distress  The patient appears nontoxic  HEENT reveals moist mucous membranes  Head is normocephalic and atraumatic  Conjunctiva and sclera are normal   Neck is diffusely tender to palpation  Patient has limited range of motion  There is no external findings on the skin  Lungs are clear to auscultation bilaterally without any wheezes, rales or rhonchi   Heart is regular rate and rhythm without any murmurs, rubs or gallops  Abdomen is soft and nontender without any rebound or guarding  Extremities appear grossly normal without any significant arthropathy  Patient is awake, alert, and oriented x3  The patient has normal interaction  Motor is 5 out of 5 bilateral upper extremities in the radial, ulnar, and median nerve distribution  Musculoskeletal neck pain with tension type headache  Patient will be medicated reassessed  After medication, the patient noted improvement in his headache  He requested prescriptions for medication for his neck discomfort  I recommended follow up with [facility]      Critical Care Time  Procedures 180.34

## 2023-06-27 ENCOUNTER — APPOINTMENT (EMERGENCY)
Dept: RADIOLOGY | Facility: HOSPITAL | Age: 58
End: 2023-06-27
Payer: MEDICARE

## 2023-06-27 ENCOUNTER — HOSPITAL ENCOUNTER (EMERGENCY)
Facility: HOSPITAL | Age: 58
Discharge: HOME | End: 2023-06-28
Attending: EMERGENCY MEDICINE | Admitting: EMERGENCY MEDICINE
Payer: MEDICARE

## 2023-06-27 VITALS
HEART RATE: 76 BPM | RESPIRATION RATE: 22 BRPM | SYSTOLIC BLOOD PRESSURE: 135 MMHG | DIASTOLIC BLOOD PRESSURE: 84 MMHG | TEMPERATURE: 97.4 F | OXYGEN SATURATION: 96 %

## 2023-06-27 DIAGNOSIS — S69.90XA FINGER INJURY, INITIAL ENCOUNTER: Primary | ICD-10-CM

## 2023-06-27 DIAGNOSIS — S61.411A LACERATION OF RIGHT HAND: ICD-10-CM

## 2023-06-27 LAB
ALBUMIN SERPL BCP-MCNC: 4.1 G/DL (ref 3.5–5)
ALP SERPL-CCNC: 59 U/L (ref 34–104)
ALT SERPL W P-5'-P-CCNC: 17 U/L (ref 7–52)
ANION GAP SERPL CALCULATED.3IONS-SCNC: 7 MMOL/L
APTT PPP: 25 SECONDS (ref 23–37)
AST SERPL W P-5'-P-CCNC: 16 U/L (ref 13–39)
BASOPHILS # BLD AUTO: 0.05 THOUSANDS/ÂΜL (ref 0–0.1)
BASOPHILS NFR BLD AUTO: 1 % (ref 0–1)
BILIRUB SERPL-MCNC: 0.34 MG/DL (ref 0.2–1)
BUN SERPL-MCNC: 22 MG/DL (ref 5–25)
CALCIUM SERPL-MCNC: 9.1 MG/DL (ref 8.4–10.2)
CHLORIDE SERPL-SCNC: 109 MMOL/L (ref 96–108)
CO2 SERPL-SCNC: 23 MMOL/L (ref 21–32)
CREAT SERPL-MCNC: 0.93 MG/DL (ref 0.6–1.3)
EOSINOPHIL # BLD AUTO: 0.16 THOUSAND/ÂΜL (ref 0–0.61)
EOSINOPHIL NFR BLD AUTO: 3 % (ref 0–6)
ERYTHROCYTE [DISTWIDTH] IN BLOOD BY AUTOMATED COUNT: 13.2 % (ref 11.6–15.1)
GFR SERPL CREATININE-BSD FRML MDRD: 89 ML/MIN/1.73SQ M
GLUCOSE SERPL-MCNC: 123 MG/DL (ref 65–140)
HCT VFR BLD AUTO: 42.9 % (ref 36.5–49.3)
HGB BLD-MCNC: 14 G/DL (ref 12–17)
IMM GRANULOCYTES # BLD AUTO: 0.01 THOUSAND/UL (ref 0–0.2)
IMM GRANULOCYTES NFR BLD AUTO: 0 % (ref 0–2)
INR PPP: 1.01 (ref 0.84–1.19)
LYMPHOCYTES # BLD AUTO: 1.73 THOUSANDS/ÂΜL (ref 0.6–4.47)
LYMPHOCYTES NFR BLD AUTO: 35 % (ref 14–44)
MCH RBC QN AUTO: 31.5 PG (ref 26.8–34.3)
MCHC RBC AUTO-ENTMCNC: 32.6 G/DL (ref 31.4–37.4)
MCV RBC AUTO: 97 FL (ref 82–98)
MONOCYTES # BLD AUTO: 0.41 THOUSAND/ÂΜL (ref 0.17–1.22)
MONOCYTES NFR BLD AUTO: 8 % (ref 4–12)
NEUTROPHILS # BLD AUTO: 2.54 THOUSANDS/ÂΜL (ref 1.85–7.62)
NEUTS SEG NFR BLD AUTO: 53 % (ref 43–75)
NRBC BLD AUTO-RTO: 0 /100 WBCS
PLATELET # BLD AUTO: 147 THOUSANDS/UL (ref 149–390)
PMV BLD AUTO: 9.9 FL (ref 8.9–12.7)
POTASSIUM SERPL-SCNC: 3.7 MMOL/L (ref 3.5–5.3)
PROT SERPL-MCNC: 6.8 G/DL (ref 6.4–8.4)
PROTHROMBIN TIME: 13.5 SECONDS (ref 11.6–14.5)
RBC # BLD AUTO: 4.44 MILLION/UL (ref 3.88–5.62)
SODIUM SERPL-SCNC: 139 MMOL/L (ref 135–147)
WBC # BLD AUTO: 4.9 THOUSAND/UL (ref 4.31–10.16)

## 2023-06-27 PROCEDURE — 85730 THROMBOPLASTIN TIME PARTIAL: CPT | Performed by: EMERGENCY MEDICINE

## 2023-06-27 PROCEDURE — 90715 TDAP VACCINE 7 YRS/> IM: CPT | Performed by: EMERGENCY MEDICINE

## 2023-06-27 PROCEDURE — 73130 X-RAY EXAM OF HAND: CPT

## 2023-06-27 PROCEDURE — 85610 PROTHROMBIN TIME: CPT | Performed by: EMERGENCY MEDICINE

## 2023-06-27 PROCEDURE — 36415 COLL VENOUS BLD VENIPUNCTURE: CPT | Performed by: EMERGENCY MEDICINE

## 2023-06-27 PROCEDURE — 85025 COMPLETE CBC W/AUTO DIFF WBC: CPT | Performed by: EMERGENCY MEDICINE

## 2023-06-27 PROCEDURE — 80053 COMPREHEN METABOLIC PANEL: CPT | Performed by: EMERGENCY MEDICINE

## 2023-06-27 RX ORDER — HYDROMORPHONE HCL/PF 1 MG/ML
0.5 SYRINGE (ML) INJECTION ONCE
Status: COMPLETED | OUTPATIENT
Start: 2023-06-27 | End: 2023-06-27

## 2023-06-27 RX ADMIN — HYDROMORPHONE HYDROCHLORIDE 0.5 MG: 1 INJECTION, SOLUTION INTRAMUSCULAR; INTRAVENOUS; SUBCUTANEOUS at 23:11

## 2023-06-27 RX ADMIN — TETANUS TOXOID, REDUCED DIPHTHERIA TOXOID AND ACELLULAR PERTUSSIS VACCINE, ADSORBED 0.5 ML: 5; 2.5; 8; 8; 2.5 SUSPENSION INTRAMUSCULAR at 23:15

## 2023-06-28 ENCOUNTER — TELEPHONE (OUTPATIENT)
Dept: OBGYN CLINIC | Facility: OTHER | Age: 58
End: 2023-06-28

## 2023-06-28 RX ORDER — CEPHALEXIN 500 MG/1
500 CAPSULE ORAL EVERY 6 HOURS SCHEDULED
Qty: 28 CAPSULE | Refills: 0 | Status: SHIPPED | OUTPATIENT
Start: 2023-06-28 | End: 2023-07-05

## 2023-06-28 RX ORDER — LIDOCAINE HYDROCHLORIDE 20 MG/ML
20 INJECTION, SOLUTION EPIDURAL; INFILTRATION; INTRACAUDAL; PERINEURAL ONCE
Status: COMPLETED | OUTPATIENT
Start: 2023-06-28 | End: 2023-06-28

## 2023-06-28 RX ORDER — HYDROMORPHONE HCL/PF 1 MG/ML
0.5 SYRINGE (ML) INJECTION ONCE
Status: COMPLETED | OUTPATIENT
Start: 2023-06-28 | End: 2023-06-28

## 2023-06-28 RX ORDER — OXYCODONE HYDROCHLORIDE 5 MG/1
5 TABLET ORAL EVERY 6 HOURS PRN
Qty: 12 TABLET | Refills: 0 | Status: SHIPPED | OUTPATIENT
Start: 2023-06-28 | End: 2023-07-08

## 2023-06-28 RX ADMIN — LIDOCAINE HYDROCHLORIDE 20 ML: 20 INJECTION, SOLUTION EPIDURAL; INFILTRATION; INTRACAUDAL; PERINEURAL at 01:00

## 2023-06-28 RX ADMIN — CEFTRIAXONE 1000 MG: 10 INJECTION, POWDER, FOR SOLUTION INTRAVENOUS at 01:00

## 2023-06-28 RX ADMIN — HYDROMORPHONE HYDROCHLORIDE 0.5 MG: 1 INJECTION, SOLUTION INTRAMUSCULAR; INTRAVENOUS; SUBCUTANEOUS at 00:17

## 2023-06-28 NOTE — ED PROVIDER NOTES
History  Chief Complaint   Patient presents with   • Hand Injury     Saw vs R hand     (Ludy Caraballo) Ludy Caraballo is a 60 y o  male who identifies as male    They presented to the emergency department on June 27, 2023  Patient presents with:  Hand Injury: Saw vs R hand  The patient states that he was working with a cement , and  He knew his hand was in a lot of pain, and there was blood everywhere  Patient immediately wrapped his hand in his shirt, as well as a towel and came to the emergency department for evaluation  Patient states that he does not take any blood thinners currently, and is unsure of his last tetanus  Patient notes that he is in 10 out of 10 pain  Patient denies numbness, tingling, weakness, or any other complaint at this time  Allergies include:  -- Morphine -- Swelling      Immunizations:    Immunization History  Administered            Date(s) Administered   INFLUENZA             09/03/2015 08/14/2017 09/20/2018     Influenza, injectable, quadrivalent, preservative free 0 5 mL                         09/20/2018     Influenza, seasonal, injectable                         09/03/2015 08/14/2017     Pneumococcal Polysaccharide PPV23                         05/22/2015    Immunizations Reviewed  Prior to Admission Medications   Prescriptions Last Dose Informant Patient Reported?  Taking?   atorvastatin (LIPITOR) 20 mg tablet   No No   Sig: Take 1 tablet (20 mg total) by mouth daily   ciprofloxacin-dexamethasone (CIPRODEX) otic suspension   No No   Sig: Administer 4 drops into the left ear 2 (two) times a day for 7 days   lansoprazole (PREVACID) 15 mg capsule   No No   Sig: take 1 capsule by mouth once daily   lisinopril (ZESTRIL) 10 mg tablet   No No   Sig: Take 1 tablet (10 mg total) by mouth daily   naproxen (NAPROSYN) 500 mg tablet   No No   Sig: Take 1 tablet (500 mg total) by mouth 2 (two) times a day with meals      Facility-Administered Medications: None Past Medical History:   Diagnosis Date   • Anxiety    • Asthma    • Peterson's esophagus    • Chest pain     Last assessed - 7/31/17   • Chronic GERD     Last assessed - 3/11/16   • COPD (chronic obstructive pulmonary disease) (HCC)    • Hep C w/o coma, chronic ([facility] [address] )     treated   • Hepatic disease    • History of neck problems    • Hypercholesterolemia    • Hypertension    • Kidney disease    • Kidney stones        Past Surgical History:   Procedure Laterality Date   • APPENDECTOMY     • ESOPHAGOGASTRODUODENOSCOPY     • HERNIA REPAIR      Resolved    • GA ESOPHAGOGASTRODUODENOSCOPY TRANSORAL DIAGNOSTIC N/A 11/30/2017    Procedure: ESOPHAGOGASTRODUODENOSCOPY (EGD); Surgeon: Patricio Deras DO;  Location: [facility]; Service: Gastroenterology       Family History   Problem Relation Age of Onset   • Hyperlipidemia Mother    • Cancer Mother         Malignant Neoplasm    • Heart attack Father    • Lung cancer Maternal Grandmother    • Lung cancer Maternal Grandfather    • Heart attack Maternal Grandfather    • No Known Problems Paternal Grandmother    • No Known Problems Paternal Grandfather    • Other Other         Cardiac Disorder   • Stroke Other      I have reviewed and agree with the history as documented  E-Cigarette/Vaping   • E-Cigarette Use Never User      E-Cigarette/Vaping Substances     Social History     Tobacco Use   • Smoking status: Every Day     Packs/day: 1 00     Types: Cigarettes   • Smokeless tobacco: Current   • Tobacco comments:     since 14 yrs old, Smokes less than 1 pack per day  per [employer]    Vaping Use   • Vaping Use: Never used   Substance Use Topics   • Alcohol use: Not Currently   • Drug use: Yes     Types: Methamphetamines     Comment: Hx of use, states clean since 1/2014, used meth this am 3/22/21        Review of Systems   Musculoskeletal:        Hand trauma   All other systems reviewed and are negative        Physical Exam  ED Triage Vitals   Temperature Pulse Respirations Blood Pressure SpO2   06/27/23 2257 06/27/23 2256 06/27/23 2256 06/27/23 2257 06/27/23 2256   (!) 97 4 °F (36 3 °C) 76 22 135/84 96 %      Temp src Heart Rate Source Patient Position - Orthostatic VS BP Location FiO2 (%)   -- -- 06/27/23 2256 06/27/23 2256 --     Sitting Right arm       Pain Score       06/27/23 2311       10 - Worst Possible Pain             Orthostatic Vital Signs  Vitals:    06/27/23 2256 06/27/23 2257   BP:  135/84   Pulse: 76    Patient Position - Orthostatic VS: Sitting        Physical Exam  Vitals and nursing note reviewed  Constitutional:       General: He is not in acute distress  Appearance: He is well-developed  HENT:      Head: Normocephalic and atraumatic  Right Ear: External ear normal       Left Ear: External ear normal       Nose: Nose normal       Mouth/Throat:      Mouth: Mucous membranes are moist    Eyes:      Conjunctiva/sclera: Conjunctivae normal    Cardiovascular:      Rate and Rhythm: Normal rate and regular rhythm  Heart sounds: No murmur heard  Pulmonary:      Effort: Pulmonary effort is normal  No respiratory distress  Breath sounds: Normal breath sounds  Abdominal:      Palpations: Abdomen is soft  Tenderness: There is no abdominal tenderness  Musculoskeletal:         General: Tenderness (Right third digit proximal phalanx with open 2 5 cm laceration with visible tendon, no pulsatile bleeding, no active bleeding, neurovascularly intact) present  Cervical back: Neck supple  Skin:     General: Skin is warm and dry  Capillary Refill: Capillary refill takes less than 2 seconds  Neurological:      Mental Status: He is alert     Psychiatric:         Mood and Affect: Mood normal          ED Medications  Medications   HYDROmorphone (DILAUDID) injection 0 5 mg (0 5 mg Intravenous Given 6/27/23 2311)   tetanus-diphtheria-acellular pertussis (BOOSTRIX) IM injection 0 5 mL (0 5 mL Intramuscular Given 6/27/23 2315)   HYDROmorphone (DILAUDID) injection 0 5 mg (0 5 mg Intravenous Given 6/28/23 0017)   lidocaine (PF) (XYLOCAINE-MPF) 2 % injection 20 mL (20 mL Infiltration Given by Other 6/28/23 0100)   ceftriaxone (ROCEPHIN) 1 g/50 mL in dextrose IVPB (1,000 mg Intravenous New Bag 6/28/23 0100)       Diagnostic Studies  Results Reviewed     Procedure Component Value Units Date/Time    Protime-INR [[id number]]  (Normal) Collected: 06/27/23 2317    Lab Status: Final result Specimen: Blood from Arm, Left Updated: 06/27/23 2346     Protime 13 5 seconds      INR 1 01    APTT [[id number]]  (Normal) Collected: 06/27/23 2317    Lab Status: Final result Specimen: Blood from Arm, Left Updated: 06/27/23 2346     PTT 25 seconds     Comprehensive metabolic panel [[id number]]  (Abnormal) Collected: 06/27/23 2317    Lab Status: Final result Specimen: Blood from Arm, Left Updated: 06/27/23 2341     Sodium 139 mmol/L      Potassium 3 7 mmol/L      Chloride 109 mmol/L      CO2 23 mmol/L      ANION GAP 7 mmol/L      BUN 22 mg/dL      Creatinine 0 93 mg/dL      Glucose 123 mg/dL      Calcium 9 1 mg/dL      AST 16 U/L      ALT 17 U/L      Alkaline Phosphatase 59 U/L      Total Protein 6 8 g/dL      Albumin 4 1 g/dL      Total Bilirubin 0 34 mg/dL      eGFR 89 ml/min/1 73sq m     Narrative:      [place] guidelines for Chronic Kidney Disease (CKD):   •  Stage 1 with normal or high GFR (GFR > 90 mL/min/1 73 square meters)  •  Stage 2 Mild CKD (GFR = 60-89 mL/min/1 73 square meters)  •  Stage 3A Moderate CKD (GFR = 45-59 mL/min/1 73 square meters)  •  Stage 3B Moderate CKD (GFR = 30-44 mL/min/1 73 square meters)  •  Stage 4 Severe CKD (GFR = 15-29 mL/min/1 73 square meters)  •  Stage 5 End Stage CKD (GFR <15 mL/min/1 73 square meters)  Note: GFR calculation is accurate only with a steady state creatinine    CBC and differential [[id number]]  (Abnormal) Collected: 06/27/23 2317    Lab Status: Final result Specimen: Blood from Arm, Left Updated: 06/27/23 2332     WBC 4 90 Thousand/uL      RBC 4 44 Million/uL      Hemoglobin 14 0 g/dL      Hematocrit 42 9 %      MCV 97 fL      MCH 31 5 pg      MCHC 32 6 g/dL      RDW 13 2 %      MPV 9 9 fL      Platelets 616 Thousands/uL      nRBC 0 /100 WBCs      Neutrophils Relative 53 %      Immat GRANS % 0 %      Lymphocytes Relative 35 %      Monocytes Relative 8 %      Eosinophils Relative 3 %      Basophils Relative 1 %      Neutrophils Absolute 2 54 Thousands/µL      Immature Grans Absolute 0 01 Thousand/uL      Lymphocytes Absolute 1 73 Thousands/µL      Monocytes Absolute 0 41 Thousand/µL      Eosinophils Absolute 0 16 Thousand/µL      Basophils Absolute 0 05 Thousands/µL                  XR hand 3+ views RIGHT   ED Interpretation by Thong Hawthorne MD (06/27 2330)   No acute fracture or dislocation  Procedures  Universal Protocol:  Consent: Verbal consent obtained    Consent given by: patient  Patient identity confirmed: verbally with patient    Laceration repair    Date/Time: 6/28/2023 1:33 AM    Performed by: Thong Hawthorne MD  Authorized by: Thong Hawthorne MD  Body area: upper extremity  Location details: right long finger  Laceration length: 2 5 cm  Tendon involvement: superficial  Nerve involvement: none  Vascular damage: no  Anesthesia: local infiltration and digital block    Anesthesia:  Local Anesthetic: lidocaine 2% without epinephrine  Anesthetic total: 5 mL    Wound Dehiscence:  Superficial Wound Dehiscence: simple closure      Procedure Details:  Irrigation solution: saline  Irrigation method: jet lavage  Amount of cleaning: extensive  Debridement: none  Degree of undermining: none  Skin closure: 4-0 Prolene  Number of sutures: 4  Technique: simple  Approximation: close  Approximation difficulty: simple  Dressing: non-adhesive packing strip (Xeroform)  Patient tolerance: patient tolerated the procedure well with no immediate complications  Cleaning details: dirt          ED Course                                       Medical Decision Making  Patient presents with:  Hand Injury: Saw vs R hand      Patient seen and examined noted to have Right third digit proximal phalanx with open 2 5 cm laceration with visible tendon, no pulsatile bleeding, no active bleeding, neurovascularly intact        Temp:  (97 4 °F (36 3 °C)) 97 4 °F (36 3 °C)  HR:  (76) 76  Resp:  (22) 22  BP: (135)/(84) 135/84        Due to patient's history and presentation the following laboratory evidence was collected:  Recent Results (from the past 12 hour(s))  -CBC and differential:   Collection Time: 06/27/23 11:17 PM       Result                      Value             Ref Range           WBC                         4 90              4 31 - 10 16*       RBC                         4 44              3 88 - 5 62 *       Hemoglobin                  14 0              12 0 - 17 0 *       Hematocrit                  42 9              36 5 - 49 3 %       MCV                         97                82 - 98 fL          MCH                         31 5              26 8 - 34 3 *       MCHC                        32 6              31 4 - 37 4 *       RDW                         13 2              11 6 - 15 1 %       MPV                         9 9               8 9 - 12 7 fL       Platelets                   147 (L)           149 - 390 Th*       nRBC                        0                 /100 WBCs           Neutrophils Relative        53                43 - 75 %           Immat GRANS %               0                 0 - 2 %             Lymphocytes Relative        35                14 - 44 %           Monocytes Relative          8                 4 - 12 %            Eosinophils Relative        3                 0 - 6 %             Basophils Relative          1                 0 - 1 %             Neutrophils Absolute        2 54              1 85 - 7 62 *       Immature Grans Absolute     0 01              0 00 - 0 20 * Lymphocytes Absolute        1 73              0 60 - 4 47 *       Monocytes Absolute          0 41              0 17 - 1 22 *       Eosinophils Absolute        0 16              0 00 - 0 61 *       Basophils Absolute          0 05              0 00 - 0 10 *  -Comprehensive metabolic panel:   Collection Time: 06/27/23 11:17 PM       Result                      Value             Ref Range           Sodium                      139               135 - 147 mm*       Potassium                   3 7               3 5 - 5 3 mm*       Chloride                    109 (H)           96 - 108 mmo*       CO2                         23                21 - 32 mmol*       ANION GAP                   7                 mmol/L              BUN                         22                5 - 25 mg/dL        Creatinine                  0 93              0 60 - 1 30 *       Glucose                     123               65 - 140 mg/*       Calcium                     9 1               8 4 - 10 2 m*       AST                         16                13 - 39 U/L         ALT                         17                7 - 52 U/L          Alkaline Phosphatase        59                34 - 104 U/L        Total Protein               6 8               6 4 - 8 4 g/*       Albumin                     4 1               3 5 - 5 0 g/*       Total Bilirubin             0 34              0 20 - 1 00 *       eGFR                        89                ml/min/1 73s*  -Protime-INR:   Collection Time: 06/27/23 11:17 PM       Result                      Value             Ref Range           Protime                     13 5              11 6 - 14 5 *       INR                         1 01              0 84 - 1 19    -APTT:   Collection Time: 06/27/23 11:17 PM       Result                      Value             Ref Range           PTT                         25                23 - 37 seco*    Due to patient's history and presentation the following imaging was collected:  XR hand 3+ views RIGHT   ED Interpretation    No acute fracture or dislocation  Patient was administered:      Medication Administration - last 24 hours from 06/27/2023 0146 to   06/28/2023 0146       Date/Time Order Dose Route Action Action by     06/27/2023 2311 EDT HYDROmorphone (DILAUDID) injection 0 5 mg 0 5 mg   Intravenous Given Ayla Stephens     06/27/2023 2315 EDT tetanus-diphtheria-acellular pertussis (BOOSTRIX) IM   injection 0 5 mL 0 5 mL Intramuscular Given Ayla Stephens     06/28/2023 0017 EDT HYDROmorphone (DILAUDID) injection 0 5 mg 0 5 mg   Intravenous Given Ayla Stephens     06/28/2023 0100 EDT lidocaine (PF) (XYLOCAINE-MPF) 2 % injection 20 mL   20 mL Infiltration Given by Other Ayla Stephens     06/28/2023 0100 EDT ceftriaxone (ROCEPHIN) 1 g/50 mL in dextrose IVPB   1,000 mg Intravenous New Bag Ayla Stephens        Discussed patient's case with Dr Sisi Childress (hand surgery) who recommended copious irrigation and laceration repair as detailed as above  Patient was given antibiotics as above  Patient was splinted and given ambulatory referral to hand surgery as well as information for clinic follow-up  Patient appears well, is nontoxic appearing, expresses understanding and agrees with plan of care at this time  In light of this patient would benefit from outpatient management  Patient was rx'd: Cephalexin, oxycodone      Finger injury, initial encounter: acute illness or injury  Laceration of right hand: acute illness or injury  Amount and/or Complexity of Data Reviewed  Labs: ordered  Radiology: ordered and independent interpretation performed  Risk  Prescription drug management              Disposition  Final diagnoses:   Finger injury, initial encounter   Laceration of right hand     Time reflects when diagnosis was documented in both MDM as applicable and the Disposition within this note     Time User Action Codes Description Comment    6/28/2023 1:19 AM Krishna BABIN Add [S69 90XA] Finger injury, initial encounter     6/28/2023  1:19 AM carolina Prieto Add [J14 [id number]] Laceration of right hand       ED Disposition     ED Disposition   Discharge    Condition   Stable    Date/Time   Wed Jun 28, 2023  1:19 AM    Comment   Deni Carvajal discharge to home/self care  Follow-up Information     Follow up With Specialties Details Why Contact Info    Virginia Das MD Orthopedic Surgery, Hand Surgery   [place]  [phone]            Patient's Medications   Discharge Prescriptions    CEPHALEXIN (KEFLEX) 500 MG CAPSULE    Take 1 capsule (500 mg total) by mouth every 6 (six) hours for 7 days       Start Date: 6/28/2023 End Date: 7/5/2023       Order Dose: 500 mg       Quantity: 28 capsule    Refills: 0    OXYCODONE (ROXICODONE) 5 IMMEDIATE RELEASE TABLET    Take 1 tablet (5 mg total) by mouth every 6 (six) hours as needed for moderate pain for up to 10 days Max Daily Amount: 20 mg       Start Date: 6/28/2023 End Date: 7/8/2023       Order Dose: 5 mg       Quantity: 12 tablet    Refills: 0         PDMP Review       Value Time User    PDMP Reviewed  Yes 3/25/2021  4:53 PM Ling Heredia PA-C           ED Provider  Attending physically available and evaluated Deni Carvajal  I managed the patient along with the ED Attending      Electronically Signed by         Maryanne Shore MD  06/28/23 4033

## 2023-06-28 NOTE — TELEPHONE ENCOUNTER
Patient is being referred to a orthopedics  Please schedule accordingly      [facility] [address]   [phone]

## 2023-06-29 NOTE — TELEPHONE ENCOUNTER
Caller: Patient    Doctor: Hand    Reason for call: patient called to schedule appointment     Call back#: [phone]

## 2024-08-15 ENCOUNTER — APPOINTMENT (EMERGENCY)
Dept: RADIOLOGY | Facility: HOSPITAL | Age: 59
End: 2024-08-15
Payer: MEDICARE

## 2024-08-15 ENCOUNTER — HOSPITAL ENCOUNTER (EMERGENCY)
Facility: HOSPITAL | Age: 59
Discharge: HOME | End: 2024-08-15
Attending: EMERGENCY MEDICINE
Payer: MEDICARE

## 2024-08-15 VITALS
TEMPERATURE: 97.4 F | DIASTOLIC BLOOD PRESSURE: 87 MMHG | SYSTOLIC BLOOD PRESSURE: 151 MMHG | HEART RATE: 63 BPM | RESPIRATION RATE: 18 BRPM | OXYGEN SATURATION: 96 %

## 2024-08-15 DIAGNOSIS — R07.9 CHEST PAIN: Primary | ICD-10-CM

## 2024-08-15 DIAGNOSIS — D72.819 LEUKOPENIA: ICD-10-CM

## 2024-08-15 DIAGNOSIS — I10 HYPERTENSION: ICD-10-CM

## 2024-08-15 DIAGNOSIS — D69.6 THROMBOCYTOPENIA (HCC): ICD-10-CM

## 2024-08-15 LAB
2HR DELTA HS TROPONIN: -1 NG/L
ALBUMIN SERPL BCG-MCNC: 4.1 G/DL (ref 3.5–5)
ALP SERPL-CCNC: 67 U/L (ref 34–104)
ALT SERPL W P-5'-P-CCNC: 19 U/L (ref 7–52)
ANION GAP SERPL CALCULATED.3IONS-SCNC: 6 MMOL/L (ref 4–13)
AST SERPL W P-5'-P-CCNC: 17 U/L (ref 13–39)
BASOPHILS # BLD AUTO: 0.03 THOUSANDS/ÂΜL (ref 0–0.1)
BASOPHILS NFR BLD AUTO: 1 % (ref 0–1)
BILIRUB SERPL-MCNC: 0.37 MG/DL (ref 0.2–1)
BNP SERPL-MCNC: 41 PG/ML (ref 0–100)
BUN SERPL-MCNC: 15 MG/DL (ref 5–25)
CALCIUM SERPL-MCNC: 9 MG/DL (ref 8.4–10.2)
CARDIAC TROPONIN I PNL SERPL HS: 5 NG/L
CARDIAC TROPONIN I PNL SERPL HS: 6 NG/L
CHLORIDE SERPL-SCNC: 107 MMOL/L (ref 96–108)
CO2 SERPL-SCNC: 25 MMOL/L (ref 21–32)
CREAT SERPL-MCNC: 0.95 MG/DL (ref 0.6–1.3)
EOSINOPHIL # BLD AUTO: 0.1 THOUSAND/ÂΜL (ref 0–0.61)
EOSINOPHIL NFR BLD AUTO: 3 % (ref 0–6)
ERYTHROCYTE [DISTWIDTH] IN BLOOD BY AUTOMATED COUNT: 13.5 % (ref 11.6–15.1)
GFR SERPL CREATININE-BSD FRML MDRD: 86 ML/MIN/1.73SQ M
GLUCOSE SERPL-MCNC: 126 MG/DL (ref 65–140)
HCT VFR BLD AUTO: 43.2 % (ref 36.5–49.3)
HGB BLD-MCNC: 13.9 G/DL (ref 12–17)
IMM GRANULOCYTES # BLD AUTO: 0.01 THOUSAND/UL (ref 0–0.2)
IMM GRANULOCYTES NFR BLD AUTO: 0 % (ref 0–2)
LIPASE SERPL-CCNC: 15 U/L (ref 11–82)
LYMPHOCYTES # BLD AUTO: 1.03 THOUSANDS/ÂΜL (ref 0.6–4.47)
LYMPHOCYTES NFR BLD AUTO: 28 % (ref 14–44)
MCH RBC QN AUTO: 30.9 PG (ref 26.8–34.3)
MCHC RBC AUTO-ENTMCNC: 32.2 G/DL (ref 31.4–37.4)
MCV RBC AUTO: 96 FL (ref 82–98)
MONOCYTES # BLD AUTO: 0.41 THOUSAND/ÂΜL (ref 0.17–1.22)
MONOCYTES NFR BLD AUTO: 11 % (ref 4–12)
NEUTROPHILS # BLD AUTO: 2.08 THOUSANDS/ÂΜL (ref 1.85–7.62)
NEUTS SEG NFR BLD AUTO: 57 % (ref 43–75)
NRBC BLD AUTO-RTO: 0 /100 WBCS
PLATELET # BLD AUTO: 141 THOUSANDS/UL (ref 149–390)
PMV BLD AUTO: 9.7 FL (ref 8.9–12.7)
POTASSIUM SERPL-SCNC: 3.9 MMOL/L (ref 3.5–5.3)
PROT SERPL-MCNC: 6.8 G/DL (ref 6.4–8.4)
RBC # BLD AUTO: 4.5 MILLION/UL (ref 3.88–5.62)
SODIUM SERPL-SCNC: 138 MMOL/L (ref 135–147)
WBC # BLD AUTO: 3.66 THOUSAND/UL (ref 4.31–10.16)

## 2024-08-15 PROCEDURE — 96360 HYDRATION IV INFUSION INIT: CPT

## 2024-08-15 PROCEDURE — 84484 ASSAY OF TROPONIN QUANT: CPT

## 2024-08-15 PROCEDURE — 83880 ASSAY OF NATRIURETIC PEPTIDE: CPT

## 2024-08-15 PROCEDURE — 71045 X-RAY EXAM CHEST 1 VIEW: CPT

## 2024-08-15 PROCEDURE — 36415 COLL VENOUS BLD VENIPUNCTURE: CPT

## 2024-08-15 PROCEDURE — 83690 ASSAY OF LIPASE: CPT

## 2024-08-15 PROCEDURE — 80053 COMPREHEN METABOLIC PANEL: CPT

## 2024-08-15 PROCEDURE — 85025 COMPLETE CBC W/AUTO DIFF WBC: CPT

## 2024-08-15 PROCEDURE — 93005 ELECTROCARDIOGRAM TRACING: CPT

## 2024-08-15 PROCEDURE — 99285 EMERGENCY DEPT VISIT HI MDM: CPT

## 2024-08-15 RX ORDER — ASPIRIN 325 MG
325 TABLET ORAL ONCE
Status: COMPLETED | OUTPATIENT
Start: 2024-08-15 | End: 2024-08-15

## 2024-08-15 RX ADMIN — ASPIRIN 325 MG ORAL TABLET 325 MG: 325 PILL ORAL at 12:22

## 2024-08-15 RX ADMIN — SODIUM CHLORIDE 1000 ML: 0.9 INJECTION, SOLUTION INTRAVENOUS at 15:02

## 2024-08-15 NOTE — ED PROVIDER NOTES
History  Chief Complaint   Patient presents with    Chest Pain     Started 1 wk ago had some chest pain but last night intensified. C/o chest pain, cob, forgetfulness, dizziness. Hx meth use last time 2 days ago     59-year-old male presents today with concerns of chest pain, shortness of breath and dizziness, off and on over the last few days, 5 to 7 days.  States that he did smoke meth 2 days ago, which did not seem to make his symptoms any worse.  States that his symptoms mainly are during exertion, when going up stairs or walking around.  States that he denies any nausea or vomiting.  No bowel symptoms.  No abdominal pain or flank pain/back pain.  States that this pain is isolated to the left chest and does not radiate.  Described as a pressure however when it gets bad it is a squeezing pain.  Denies any difficulty swallowing.  Denies any visual changes including double vision or blurry vision.  Denies sensation of room spinning.  No injuries.        Prior to Admission Medications   Prescriptions Last Dose Informant Patient Reported? Taking?   atorvastatin (LIPITOR) 20 mg tablet   No No   Sig: Take 1 tablet (20 mg total) by mouth daily   ciprofloxacin-dexamethasone (CIPRODEX) otic suspension   No No   Sig: Administer 4 drops into the left ear 2 (two) times a day for 7 days   lansoprazole (PREVACID) 15 mg capsule   No No   Sig: take 1 capsule by mouth once daily   lisinopril (ZESTRIL) 10 mg tablet   No No   Sig: Take 1 tablet (10 mg total) by mouth daily   naproxen (NAPROSYN) 500 mg tablet   No No   Sig: Take 1 tablet (500 mg total) by mouth 2 (two) times a day with meals      Facility-Administered Medications: None       Past Medical History:   Diagnosis Date    Anxiety     Asthma     Peterson's esophagus     Chest pain     Last assessed - 7/31/17    Chronic GERD     Last assessed - 3/11/16    COPD (chronic obstructive pulmonary disease) (HCC)     Hep C w/o coma, chronic (HCC)     treated    Hepatic disease      History of neck problems     Hypercholesterolemia     Hypertension     Kidney disease     Kidney stones        Past Surgical History:   Procedure Laterality Date    APPENDECTOMY      ESOPHAGOGASTRODUODENOSCOPY      HERNIA REPAIR      Resolved     OK ESOPHAGOGASTRODUODENOSCOPY TRANSORAL DIAGNOSTIC N/A 11/30/2017    Procedure: ESOPHAGOGASTRODUODENOSCOPY (EGD);  Surgeon: Jazmyne Lynn DO;  Location:  [facility];  Service: Gastroenterology       Family History   Problem Relation Age of Onset    Hyperlipidemia Mother     Cancer Mother         Malignant Neoplasm     Heart attack Father     Lung cancer Maternal Grandmother     Lung cancer Maternal Grandfather     Heart attack Maternal Grandfather     No Known Problems Paternal Grandmother     No Known Problems Paternal Grandfather     Other Other         Cardiac Disorder    Stroke Other      I have reviewed and agree with the history as documented.    E-Cigarette/Vaping    E-Cigarette Use Never User      E-Cigarette/Vaping Substances     Social History     Tobacco Use    Smoking status: Every Day     Current packs/day: 0.50     Types: Cigarettes    Smokeless tobacco: Current    Tobacco comments:     since 12 yrs old, Smokes less than 1 pack per day  per [employer]    Vaping Use    Vaping status: Never Used   Substance Use Topics    Alcohol use: Not Currently    Drug use: Yes     Types: Methamphetamines     Comment: Hx of use, states clean since 1/2014, used meth this am 3/22/21       Review of Systems   Constitutional:  Negative for chills and fever.   HENT:  Negative for ear pain and sore throat.    Eyes:  Negative for pain and visual disturbance.   Respiratory:  Positive for chest tightness and shortness of breath. Negative for apnea, cough, choking and stridor.    Cardiovascular:  Positive for chest pain. Negative for palpitations.   Gastrointestinal:  Negative for abdominal pain, blood in stool, constipation, diarrhea, nausea and vomiting.   Genitourinary:  Negative  for dysuria and hematuria.   Musculoskeletal:  Negative for arthralgias and back pain.   Skin:  Negative for color change and rash.   Neurological:  Positive for dizziness and light-headedness. Negative for seizures, syncope, weakness and headaches.   All other systems reviewed and are negative.      Physical Exam  Physical Exam  Vitals and nursing note reviewed.   Constitutional:       General: He is not in acute distress.     Appearance: He is not ill-appearing.   HENT:      Head: Normocephalic and atraumatic.   Eyes:      General: No scleral icterus.     Conjunctiva/sclera: Conjunctivae normal.      Pupils: Pupils are equal, round, and reactive to light.   Cardiovascular:      Rate and Rhythm: Normal rate and regular rhythm.      Pulses: Normal pulses.           Carotid pulses are 2+ on the right side and 2+ on the left side.       Radial pulses are 2+ on the right side and 2+ on the left side.        Dorsalis pedis pulses are 2+ on the right side and 2+ on the left side.        Posterior tibial pulses are 2+ on the right side and 2+ on the left side.      Heart sounds: Normal heart sounds.   Pulmonary:      Effort: Pulmonary effort is normal. No tachypnea, accessory muscle usage or respiratory distress.      Breath sounds: No stridor. No decreased breath sounds, wheezing, rhonchi or rales.   Chest:      Chest wall: No mass, tenderness or crepitus.   Abdominal:      Tenderness: There is no abdominal tenderness.   Musculoskeletal:         General: Normal range of motion.      Cervical back: Normal range of motion.      Right lower leg: No tenderness. No edema.      Left lower leg: No tenderness. No edema.   Skin:     General: Skin is warm and dry.      Capillary Refill: Capillary refill takes less than 2 seconds.      Coloration: Skin is not jaundiced.   Neurological:      Mental Status: He is alert and oriented to person, place, and time.         Vital Signs  ED Triage Vitals   Temperature Pulse Respirations  Blood Pressure SpO2   08/15/24 1137 08/15/24 1136 08/15/24 1136 08/15/24 1136 08/15/24 1136   (!) 97.4 °F (36.3 °C) 82 18 (!) 176/84 97 %      Temp src Heart Rate Source Patient Position - Orthostatic VS BP Location FiO2 (%)   -- -- -- -- --             Pain Score       --                  Vitals:    08/15/24 1136 08/15/24 1430   BP: (!) 176/84 151/87   Pulse: 82 63         Visual Acuity      ED Medications  Medications   aspirin tablet 325 mg (325 mg Oral Given 8/15/24 1222)   sodium chloride 0.9 % bolus 1,000 mL (0 mL Intravenous Stopped 8/15/24 1544)       Diagnostic Studies  Results Reviewed       Procedure Component Value Units Date/Time    HS Troponin I 2hr [[id number]]  (Normal) Collected: 08/15/24 1501    Lab Status: Final result Specimen: Blood Updated: 08/15/24 1526     hs TnI 2hr 5 ng/L      Delta 2hr hsTnI -1 ng/L     HS Troponin 0hr (reflex protocol) [[id number]]  (Normal) Collected: 08/15/24 1244    Lab Status: Final result Specimen: Blood from Arm, Right Updated: 08/15/24 1316     hs TnI 0hr 6 ng/L     B-Type Natriuretic Peptide(BNP) [[id number]]  (Normal) Collected: 08/15/24 1244    Lab Status: Final result Specimen: Blood from Arm, Right Updated: 08/15/24 1315     BNP 41 pg/mL     Comprehensive metabolic panel [[id number]] Collected: 08/15/24 1244    Lab Status: Final result Specimen: Blood from Arm, Right Updated: 08/15/24 1310     Sodium 138 mmol/L      Potassium 3.9 mmol/L      Chloride 107 mmol/L      CO2 25 mmol/L      ANION GAP 6 mmol/L      BUN 15 mg/dL      Creatinine 0.95 mg/dL      Glucose 126 mg/dL      Calcium 9.0 mg/dL      AST 17 U/L      ALT 19 U/L      Alkaline Phosphatase 67 U/L      Total Protein 6.8 g/dL      Albumin 4.1 g/dL      Total Bilirubin 0.37 mg/dL      eGFR 86 ml/min/1.73sq m     Narrative:      National Kidney Disease Foundation guidelines for Chronic Kidney Disease (CKD):     Stage 1 with normal or high GFR (GFR > 90 mL/min/1.73 square meters)    Stage 2 Mild CKD (GFR =  60-89 mL/min/1.73 square meters)    Stage 3A Moderate CKD (GFR = 45-59 mL/min/1.73 square meters)    Stage 3B Moderate CKD (GFR = 30-44 mL/min/1.73 square meters)    Stage 4 Severe CKD (GFR = 15-29 mL/min/1.73 square meters)    Stage 5 End Stage CKD (GFR <15 mL/min/1.73 square meters)  Note: GFR calculation is accurate only with a steady state creatinine    Lipase [[id number]]  (Normal) Collected: 08/15/24 1244    Lab Status: Final result Specimen: Blood from Arm, Right Updated: 08/15/24 1310     Lipase 15 u/L     CBC and differential [[id number]]  (Abnormal) Collected: 08/15/24 1244    Lab Status: Final result Specimen: Blood from Arm, Right Updated: 08/15/24 1252     WBC 3.66 Thousand/uL      RBC 4.50 Million/uL      Hemoglobin 13.9 g/dL      Hematocrit 43.2 %      MCV 96 fL      MCH 30.9 pg      MCHC 32.2 g/dL      RDW 13.5 %      MPV 9.7 fL      Platelets 141 Thousands/uL      nRBC 0 /100 WBCs      Segmented % 57 %      Immature Grans % 0 %      Lymphocytes % 28 %      Monocytes % 11 %      Eosinophils Relative 3 %      Basophils Relative 1 %      Absolute Neutrophils 2.08 Thousands/µL      Absolute Immature Grans 0.01 Thousand/uL      Absolute Lymphocytes 1.03 Thousands/µL      Absolute Monocytes 0.41 Thousand/µL      Eosinophils Absolute 0.10 Thousand/µL      Basophils Absolute 0.03 Thousands/µL                    XR chest 1 view portable   ED Interpretation by Jefferson Tompkins PA-C (08/15 1314)   Hyperinflation of lungs, consistent with COPD.  Possible trace left pleural effusion.      Final Result by Sanchez Joya MD (08/15 1519)      No acute cardiopulmonary disease.            Workstation performed: [id number]                    Procedures  Procedures         ED Course  ED Course as of 08/16/24 1324   Thu Aug 15, 2024   1215 EKG reviewed by me, normal sinus rhythm rate of 76.  , QTc 423.  There is some nonspecific ST to T wave changes in V3, no changes in V2 or V4 or any other lead.  No Q waves.   No ectopy.   1252 Absolute Neutrophils: 2.08   1252 WBC(!): 3.66   1252 Platelet Count(!): 141  Leukopenia without neutropenia.   1313 LIPASE: 15   1313 Sodium: 138   1313 Potassium: 3.9   1313 Chloride: 107   1313 Carbon Dioxide: 25   1313 ANION GAP: 6   1313 Creatinine: 0.95   1313 GLUCOSE: 126   1313 GFR, Calculated: 86   1313 Total Bilirubin: 0.37   1313 WBC(!): 3.66   1313 Platelet Count(!): 141   1318 hs TnI 0hr: 6   1318 LIPASE: 15   1318 BNP: 41   1420 hs TnI 0hr: 6  trend               HEART Risk Score      Flowsheet Row Most Recent Value   Heart Score Risk Calculator    History 2 Filed at: 08/15/2024 1318   ECG 1 Filed at: 08/15/2024 1318   Age 1 Filed at: 08/15/2024 1318   Risk Factors 2 Filed at: 08/15/2024 1318   Troponin 0 Filed at: 08/15/2024 1318   HEART Score 6 Filed at: 08/15/2024 1318                                        Medical Decision Making  59-year-old male presents today with concerns of chest pain, shortness of breath and dizziness, intermittently over the last 5 to 7 days.  Worse with exertion.  Better with rest.  ACS rule out, BNP.  EKG reviewed by me, please see above documentation. Plain radiograph(s) were reviewed by me, please see above documentation.  Will give patient 325 aspirin.  First troponin 6. Second 5. Other labs reassuring. Patient with known hx of HTN, nott taking any medications. New nonspecific leukopenia and thrombocytopenia.  Heart score 6. Refer to cardiology. Follow up family doctor for HTN and new CBC findings. Patient at time of discharge was stable, asymptomatic.  ------------------------------------------------------------  Strict return precautions discussed. Patient at time of discharge well-appearing in no acute distress, all questions answered. Patient agreeable to plan.  Patient's vitals, lab/imaging results, diagnosis, and treatment plan were discussed with the patient. All new/changed medications were discussed with patient, specifically, route of  "administration, how often and when to take, and where they can be picked up. Strict return precautions as well as close follow up with PCP was discussed with the patient and the patient was agreeable to my recommendations.  Patient verbally acknowledged understanding of the above communications. All labs reviewed and utilized in the medical decision making process (if labs were ordered). Portions of the record may have been created with voice recognition software.  Occasional wrong word or \"sound a like\" substitutions may have occurred due to the inherent limitations of voice recognition software.  Read the chart carefully and recognize, using context, where substitutions have occurred.      Amount and/or Complexity of Data Reviewed  Labs: ordered. Decision-making details documented in ED Course.  Radiology: ordered and independent interpretation performed.    Risk  OTC drugs.                 Disposition  Final diagnoses:   Chest pain   Thrombocytopenia (HCC)   Leukopenia   Hypertension     Time reflects when diagnosis was documented in both MDM as applicable and the Disposition within this note       Time User Action Codes Description Comment    8/15/2024 12:53 PM Jefferson Tompkins [R07.9] Chest pain     8/15/2024 12:53 PM Jefferson Tompkins [D69.6] Thrombocytopenia (HCC)     8/15/2024 12:53 PM Jefferson Tompkins [D72.819] Leukopenia     8/15/2024  3:30 PM Jefferson Tompkins [I10] Hypertension           ED Disposition       ED Disposition   Discharge    Condition   Stable    Date/Time   Thu Aug 15, 2024 1528    Comment   Daniel Wagner discharge to home/self care.                   Follow-up Information       Follow up With Specialties Details Why Contact Info Additional Information    [facility] Emergency Department Emergency Medicine Go to  If symptoms worsen [address] [facility] [address]  [phone] [facility] Emergency Department, [address] " Doc, [place], [place], [address]    [facility] Cardiology [facility] Cardiology Schedule an appointment as soon as possible for a visit   [address]  [address]  [facility] [address]  [phone] [facility] Cardiology [facility], [address], [place], [place], [address]   [phone]    [facility] Schedule an appointment as soon as possible for a visit   [address]  [address]  [facility] [address]  [phone] [facility], [address] [facility], [address], [place] [address]   [phone]    [facility] Surgery Schedule an appointment as soon as possible for a visit   [address]  [facility] [address] [phone] [facility], [address] [facility], [address], [place], [place], [address] [phone]            Discharge Medication List as of 8/15/2024  3:30 PM        CONTINUE these medications which have NOT CHANGED    Details   atorvastatin (LIPITOR) 20 mg tablet Take 1 tablet (20 mg total) by mouth daily, Starting Fri 5/29/2020, Normal      ciprofloxacin-dexamethasone (CIPRODEX) otic suspension Administer 4 drops into the left ear 2 (two) times a day for 7 days, Starting Thu 10/6/2022, Until Thu 10/13/2022, Normal      lansoprazole (PREVACID) 15 mg capsule take 1 capsule by mouth once daily, Normal      lisinopril (ZESTRIL) 10 mg tablet Take 1 tablet (10 mg total) by mouth daily, Starting Fri 5/29/2020, Normal      naproxen (NAPROSYN) 500 mg tablet Take 1 tablet (500 mg total) by mouth 2 (two) times a day with meals, Starting Fri 5/29/2020, Normal                 PDMP Review         Value Time User    PDMP Reviewed  Yes 3/25/2021  4:53 PM Mellissa Macdonald PA-C            ED Provider  Electronically Signed by             Jefferson Tompkins PA-C  08/16/24 7920

## 2024-08-15 NOTE — ED PROCEDURE NOTE
Procedure  POC Cardiac US    Date/Time: 8/15/2024 4:03 PM    Performed by: Eduin Ortez MD  Authorized by: Eduin Ortez MD    Patient location:  ED  Procedure details:     Exam Type:  Diagnostic    Indications: suspected volume depletion and chest pain      Assessment / Evaluation for: cardiac function, pericardial effusion and intravascular volume status      Exam Type: initial exam      Image quality: diagnostic    Patient Details:     Cardiac Rhythm:  Regular  Cardiac findings:     Echo technique: limited 2D      Views obtained: parasternal long axis, parasternal short axis, subcostal and apical      Pericardial effusion: absent      Tamponade physiology: absent      Wall motion: normal      LV systolic function: normal      RV dilation: none    IVC findings:     IVC Size: small      IVC Inspiratory Collapse: normal    Interpretation:     Fluid Status:  Hypovolemic                   Eduin rOtez MD  08/15/24 [id number]

## 2024-08-15 NOTE — DISCHARGE INSTRUCTIONS
Please follow-up with cardiology for this chest pain.  Please follow-up with family doctor for hypertension and for establishment of care.  Phone numbers have been provided for you.

## 2024-08-17 LAB
ATRIAL RATE: 76 BPM
P AXIS: 56 DEGREES
PR INTERVAL: 118 MS
QRS AXIS: 86 DEGREES
QRSD INTERVAL: 86 MS
QT INTERVAL: 376 MS
QTC INTERVAL: 423 MS
T WAVE AXIS: 77 DEGREES
VENTRICULAR RATE: 76 BPM

## 2024-08-17 PROCEDURE — 93010 ELECTROCARDIOGRAM REPORT: CPT | Performed by: INTERNAL MEDICINE

## 2024-09-10 ENCOUNTER — TELEPHONE (OUTPATIENT)
Age: 59
End: 2024-09-10

## 2024-09-10 NOTE — TELEPHONE ENCOUNTER
Patient calls requesting to schedule as a new patient with Dr. Diaz.  Patient last seen at [facility] in [place] 4 years ago.  Patient was recently at the [facility] and treated for chest pain & SOB.     I scheduled the patient for 9 19 2024. Please review.

## 2024-09-10 NOTE — TELEPHONE ENCOUNTER
Patient will be new, if he wishes to wait for Dr. Diaz this will be the next available appointment. Will send him a message in my chart as well.

## 2024-09-19 ENCOUNTER — OFFICE VISIT (OUTPATIENT)
Dept: FAMILY MEDICINE CLINIC | Facility: CLINIC | Age: 59
End: 2024-09-19
Payer: MEDICARE

## 2024-09-19 VITALS
HEIGHT: 73 IN | RESPIRATION RATE: 16 BRPM | WEIGHT: 211 LBS | DIASTOLIC BLOOD PRESSURE: 100 MMHG | SYSTOLIC BLOOD PRESSURE: 160 MMHG | BODY MASS INDEX: 27.96 KG/M2 | OXYGEN SATURATION: 99 % | HEART RATE: 80 BPM

## 2024-09-19 DIAGNOSIS — D69.6 THROMBOCYTOPENIA (HCC): ICD-10-CM

## 2024-09-19 DIAGNOSIS — J44.9 CHRONIC OBSTRUCTIVE PULMONARY DISEASE, UNSPECIFIED COPD TYPE (HCC): ICD-10-CM

## 2024-09-19 DIAGNOSIS — K43.9 VENTRAL HERNIA WITHOUT OBSTRUCTION OR GANGRENE: ICD-10-CM

## 2024-09-19 DIAGNOSIS — Z12.5 PROSTATE CANCER SCREENING: ICD-10-CM

## 2024-09-19 DIAGNOSIS — E78.5 DYSLIPIDEMIA: ICD-10-CM

## 2024-09-19 DIAGNOSIS — K21.9 CHRONIC GERD: ICD-10-CM

## 2024-09-19 DIAGNOSIS — K74.60 CIRRHOSIS OF LIVER WITHOUT ASCITES, UNSPECIFIED HEPATIC CIRRHOSIS TYPE (HCC): ICD-10-CM

## 2024-09-19 DIAGNOSIS — M54.2 NECK PAIN: ICD-10-CM

## 2024-09-19 DIAGNOSIS — Z23 ENCOUNTER FOR IMMUNIZATION: ICD-10-CM

## 2024-09-19 DIAGNOSIS — I10 PRIMARY HYPERTENSION: Primary | ICD-10-CM

## 2024-09-19 PROBLEM — H65.23 BILATERAL CHRONIC SEROUS OTITIS MEDIA: Status: RESOLVED | Noted: 2017-08-28 | Resolved: 2024-09-19

## 2024-09-19 PROBLEM — L85.3 XEROSIS OF SKIN: Status: RESOLVED | Noted: 2019-01-09 | Resolved: 2024-09-19

## 2024-09-19 PROCEDURE — 99204 OFFICE O/P NEW MOD 45 MIN: CPT | Performed by: FAMILY MEDICINE

## 2024-09-19 PROCEDURE — 90471 IMMUNIZATION ADMIN: CPT | Performed by: FAMILY MEDICINE

## 2024-09-19 PROCEDURE — 90472 IMMUNIZATION ADMIN EACH ADD: CPT | Performed by: FAMILY MEDICINE

## 2024-09-19 PROCEDURE — 90677 PCV20 VACCINE IM: CPT | Performed by: FAMILY MEDICINE

## 2024-09-19 PROCEDURE — 90673 RIV3 VACCINE NO PRESERV IM: CPT | Performed by: FAMILY MEDICINE

## 2024-09-19 RX ORDER — CELECOXIB 200 MG/1
200 CAPSULE ORAL 2 TIMES DAILY
Qty: 60 CAPSULE | Refills: 2 | Status: SHIPPED | OUTPATIENT
Start: 2024-09-19

## 2024-09-19 RX ORDER — BUDESONIDE AND FORMOTEROL FUMARATE DIHYDRATE 160; 4.5 UG/1; UG/1
2 AEROSOL RESPIRATORY (INHALATION) 2 TIMES DAILY
Qty: 10.2 G | Refills: 5 | Status: SHIPPED | OUTPATIENT
Start: 2024-09-19

## 2024-09-19 RX ORDER — AMLODIPINE BESYLATE 5 MG/1
5 TABLET ORAL DAILY
Qty: 30 TABLET | Refills: 2 | Status: SHIPPED | OUTPATIENT
Start: 2024-09-19

## 2024-09-19 RX ORDER — ALBUTEROL SULFATE 90 UG/1
2 INHALANT RESPIRATORY (INHALATION) EVERY 4 HOURS PRN
Qty: 18 G | Refills: 2 | Status: SHIPPED | OUTPATIENT
Start: 2024-09-19

## 2024-09-19 RX ORDER — OMEPRAZOLE 40 MG/1
40 CAPSULE, DELAYED RELEASE ORAL
Qty: 30 CAPSULE | Refills: 2 | Status: SHIPPED | OUTPATIENT
Start: 2024-09-19 | End: 2025-03-18

## 2024-09-19 RX ORDER — ATORVASTATIN CALCIUM 20 MG/1
20 TABLET, FILM COATED ORAL DAILY
Qty: 30 TABLET | Refills: 2 | Status: SHIPPED | OUTPATIENT
Start: 2024-09-19

## 2024-09-19 NOTE — ASSESSMENT & PLAN NOTE
Patient has chronic neck pain. Will try celebrex 200 daily.     Orders:    celecoxib (CeleBREX) 200 mg capsule; Take 1 capsule (200 mg total) by mouth 2 (two) times a day

## 2024-09-19 NOTE — ASSESSMENT & PLAN NOTE
Had Hep C infection in the past and was treated for it. Patient had cirrhosis on previous CT in 2021. Will check ultrasound and refer to Gastroenterology. Recent LFTs were ok.     Orders:    US right upper quadrant; Future    Ambulatory Referral to Gastroenterology; Future

## 2024-09-19 NOTE — ASSESSMENT & PLAN NOTE
Blood pressure high and patient not on medication at present. Will start amlodipine 5 mg daily. Pt advised to follow a low Na diet and to exercise on a regular basis.     Orders:    amLODIPine (NORVASC) 5 mg tablet; Take 1 tablet (5 mg total) by mouth daily

## 2024-09-19 NOTE — ASSESSMENT & PLAN NOTE
Patient has had it for years but worse in past 9 months. Will refer to Gen Surgery.     Orders:    Ambulatory Referral to General Surgery; Future

## 2024-09-19 NOTE — ASSESSMENT & PLAN NOTE
Patient was on inhalers in the past. Will restart Symbicort twice a day and rescue inhaler.     Orders:    albuterol (Ventolin HFA) 90 mcg/act inhaler; Inhale 2 puffs every 4 (four) hours as needed for wheezing or shortness of breath    budesonide-formoterol (SYMBICORT) 160-4.5 mcg/act inhaler; Inhale 2 puffs 2 (two) times a day Rinse mouth after use.

## 2024-09-19 NOTE — ASSESSMENT & PLAN NOTE
Patient has symptoms and not on medications. Will start omeprazole 40 mg daily and patient to follow GERD diet. Last EGD was in 2017.     Orders:    Ambulatory Referral to Gastroenterology; Future    omeprazole (PriLOSEC) 40 MG capsule; Take 1 capsule (40 mg total) by mouth daily before breakfast

## 2024-09-19 NOTE — PROGRESS NOTES
Ambulatory Visit  Name: Daniel Wagner      : 1965      MRN: [id number]  Encounter Provider: Richard Diaz MD  Encounter Date: 2024   Encounter department: [facility]    Assessment & Plan  Primary hypertension  Blood pressure high and patient not on medication at present. Will start amlodipine 5 mg daily. Pt advised to follow a low Na diet and to exercise on a regular basis.     Orders:    amLODIPine (NORVASC) 5 mg tablet; Take 1 tablet (5 mg total) by mouth daily    Dyslipidemia  Recheck lipids. Restart atorvastatin 20 mg daily at bedtime. Advised pt to follow a low cholesterol diet and to exercise on a regular basis.     Orders:    Lipid panel; Future    atorvastatin (LIPITOR) 20 mg tablet; Take 1 tablet (20 mg total) by mouth daily    Chronic GERD  Patient has symptoms and not on medications. Will start omeprazole 40 mg daily and patient to follow GERD diet. Last EGD was in .     Orders:    Ambulatory Referral to Gastroenterology; Future    omeprazole (PriLOSEC) 40 MG capsule; Take 1 capsule (40 mg total) by mouth daily before breakfast    Cirrhosis of liver without ascites, unspecified hepatic cirrhosis type (HCC)  Had Hep C infection in the past and was treated for it. Patient had cirrhosis on previous CT in . Will check ultrasound and refer to Gastroenterology. Recent LFTs were ok.     Orders:    US right upper quadrant; Future    Ambulatory Referral to Gastroenterology; Future    Thrombocytopenia (HCC)  Platelets have been a little low and was 141 in 2024.          Chronic obstructive pulmonary disease, unspecified COPD type (HCC)  Patient was on inhalers in the past. Will restart Symbicort twice a day and rescue inhaler.     Orders:    albuterol (Ventolin HFA) 90 mcg/act inhaler; Inhale 2 puffs every 4 (four) hours as needed for wheezing or shortness of breath    budesonide-formoterol (SYMBICORT) 160-4.5 mcg/act inhaler; Inhale 2 puffs 2 (two) times a  day Rinse mouth after use.    Ventral hernia without obstruction or gangrene  Patient has had it for years but worse in past 9 months. Will refer to Gen Surgery.     Orders:    Ambulatory Referral to General Surgery; Future    Neck pain  Patient has chronic neck pain. Will try celebrex 200 daily.     Orders:    celecoxib (CeleBREX) 200 mg capsule; Take 1 capsule (200 mg total) by mouth 2 (two) times a day    Encounter for immunization    Orders:    influenza vaccine, recombinant, PF, 0.5 mL IM (Flublok)    Pneumococcal Conjugate Vaccine 20-valent (Pcv20)    Prostate cancer screening    Orders:    PSA, Total Screen; Future         History of Present Illness     Patient here for new patient visit. Patient has history of Hypertension, Hyperlipidemia, GERD, Cirrhosis. Patient doing ok. No chest pain. Gets shortness of breath and smokes 1/2 packs per day. Has hernia and has been getting worse. Has severe pain when lifts. Patient not taking any medications for blood pressure at present and blood pressure has been high. Gets GERD and not taking medication. Patient also has chronic neck pain. Patient broke neck in the past.      Review of Systems   Constitutional:  Negative for fatigue and unexpected weight change.   Respiratory:  Positive for shortness of breath. Negative for cough.    Cardiovascular:  Negative for chest pain.   Gastrointestinal:  Positive for abdominal pain and nausea. Negative for constipation, diarrhea and vomiting.        GERD   Musculoskeletal:  Positive for neck pain. Negative for arthralgias.   Neurological:  Positive for headaches. Negative for dizziness.   Psychiatric/Behavioral:  Negative for dysphoric mood. The patient is not nervous/anxious.      Past Medical History:   Diagnosis Date    Anxiety     Asthma     Peterson's esophagus     Chest pain     Last assessed - 7/31/17    Chronic GERD     Last assessed - 3/11/16    COPD (chronic obstructive pulmonary disease) (HCC)     Hep C w/o coma,  chronic (HCC)     treated    Hepatic disease     History of neck problems     Hypercholesterolemia     Hypertension     Kidney disease     Kidney stones      Past Surgical History:   Procedure Laterality Date    APPENDECTOMY      ESOPHAGOGASTRODUODENOSCOPY      HERNIA REPAIR      Resolved     [place] ESOPHAGOGASTRODUODENOSCOPY TRANSORAL DIAGNOSTIC N/A 11/30/2017    Procedure: ESOPHAGOGASTRODUODENOSCOPY (EGD);  Surgeon: Jazmyne Lynn DO;  Location: [facility];  Service: Gastroenterology     Family History   Problem Relation Age of Onset    Hyperlipidemia Mother     Cancer Mother         Malignant Neoplasm     Heart attack Father     Lung cancer Maternal Grandmother     Lung cancer Maternal Grandfather     Heart attack Maternal Grandfather     No Known Problems Paternal Grandmother     No Known Problems Paternal Grandfather     Other Other         Cardiac Disorder    Stroke Other      Social History     Tobacco Use    Smoking status: Every Day     Current packs/day: 0.50     Types: Cigarettes    Smokeless tobacco: Current    Tobacco comments:     since 12 yrs old, Smokes less than 1 pack per day  per [employer]    Vaping Use    Vaping status: Never Used   Substance and Sexual Activity    Alcohol use: Not Currently    Drug use: Yes     Types: Methamphetamines     Comment: Hx of use, states clean since 1/2014, used meth this am 3/22/21    Sexual activity: Yes     Current Outpatient Medications on File Prior to Visit   Medication Sig    [DISCONTINUED] atorvastatin (LIPITOR) 20 mg tablet Take 1 tablet (20 mg total) by mouth daily (Patient not taking: Reported on 9/19/2024)    [DISCONTINUED] ciprofloxacin-dexamethasone (CIPRODEX) otic suspension Administer 4 drops into the left ear 2 (two) times a day for 7 days    [DISCONTINUED] lansoprazole (PREVACID) 15 mg capsule take 1 capsule by mouth once daily (Patient not taking: Reported on 9/19/2024)    [DISCONTINUED] lisinopril (ZESTRIL) 10 mg tablet Take 1 tablet (10 mg total) by  "mouth daily (Patient not taking: Reported on 9/19/2024)    [DISCONTINUED] naproxen (NAPROSYN) 500 mg tablet Take 1 tablet (500 mg total) by mouth 2 (two) times a day with meals (Patient not taking: Reported on 9/19/2024)     Allergies   Allergen Reactions    Morphine Swelling     Immunization History   Administered Date(s) Administered    COVID-19 PFIZER VACCINE 0.3 ML IM 10/15/2021    INFLUENZA 09/03/2015, 08/14/2017, 09/20/2018    Influenza, injectable, quadrivalent, preservative free 0.5 mL 09/20/2018    Influenza, seasonal, injectable 09/03/2015, 08/14/2017    Pneumococcal Polysaccharide PPV23 05/22/2015    Tdap 06/27/2023     Objective     /100 (BP Location: Left arm, Patient Position: Sitting, Cuff Size: Standard)   Pulse 80   Resp 16   Ht 6' 1\" (1.854 m)   Wt 95.7 kg (211 lb)   SpO2 99%   BMI 27.84 kg/m²     Physical Exam  Vitals and nursing note reviewed.   Constitutional:       Appearance: Normal appearance. He is normal weight.   Neck:      Vascular: No carotid bruit.   Cardiovascular:      Rate and Rhythm: Normal rate and regular rhythm.      Heart sounds: Normal heart sounds. No murmur heard.  Pulmonary:      Effort: Pulmonary effort is normal.      Breath sounds: Normal breath sounds. No wheezing.   Abdominal:      Tenderness: There is abdominal tenderness (mid abd).      Hernia: A hernia (pt has swelling and tenderness around midline incision) is present.   Musculoskeletal:      Cervical back: Normal range of motion and neck supple. Tenderness present. No muscular tenderness.      Right lower leg: No edema.      Left lower leg: No edema.   Lymphadenopathy:      Cervical: No cervical adenopathy.   Neurological:      Mental Status: He is alert.   Psychiatric:         Mood and Affect: Mood normal.         Behavior: Behavior normal.         Thought Content: Thought content normal.         Judgment: Judgment normal.         " 23-Apr-2017

## 2024-09-19 NOTE — ASSESSMENT & PLAN NOTE
Recheck lipids. Restart atorvastatin 20 mg daily at bedtime. Advised pt to follow a low cholesterol diet and to exercise on a regular basis.     Orders:    Lipid panel; Future    atorvastatin (LIPITOR) 20 mg tablet; Take 1 tablet (20 mg total) by mouth daily

## 2024-09-30 ENCOUNTER — CONSULT (OUTPATIENT)
Dept: SURGERY | Facility: CLINIC | Age: 59
End: 2024-09-30
Payer: MEDICARE

## 2024-09-30 VITALS
HEIGHT: 73 IN | TEMPERATURE: 97.6 F | WEIGHT: 210.2 LBS | HEART RATE: 87 BPM | SYSTOLIC BLOOD PRESSURE: 142 MMHG | BODY MASS INDEX: 27.86 KG/M2 | OXYGEN SATURATION: 98 % | DIASTOLIC BLOOD PRESSURE: 80 MMHG

## 2024-09-30 DIAGNOSIS — Z87.898 HISTORY OF ABDOMINAL PAIN: ICD-10-CM

## 2024-09-30 DIAGNOSIS — K43.9 VENTRAL HERNIA WITHOUT OBSTRUCTION OR GANGRENE: Primary | ICD-10-CM

## 2024-09-30 DIAGNOSIS — K74.60 CIRRHOSIS OF LIVER WITHOUT ASCITES, UNSPECIFIED HEPATIC CIRRHOSIS TYPE (HCC): ICD-10-CM

## 2024-09-30 PROCEDURE — 99244 OFF/OP CNSLTJ NEW/EST MOD 40: CPT | Performed by: SURGERY

## 2024-09-30 NOTE — PROGRESS NOTES
Ambulatory Visit  Name: Daniel Wagner      : 1965      MRN: [id number]  Encounter Provider: Ralph Matos MD  Encounter Date: 2024   Encounter department: [facility] SURG[facility] [place]    Assessment & Plan  Ventral hernia without obstruction or gangrene    Orders:    Ambulatory Referral to General Surgery    CT abdomen pelvis w contrast; Future    History of abdominal pain    Orders:    CT abdomen pelvis w contrast; Future    Cirrhosis of liver without ascites, unspecified hepatic cirrhosis type (HCC)         Patient definitely has 1 hernia defect around the umbilicus.  I think he may have multiple defects along the midline incision.  There were areas I could feel cough impulse.  I am sending him for a CT scan to determine the extent of hernia and thus postoperative planning.  He will see me after CT is done.  History of Present Illness     Daniel Wagner is a 59 y.o. male who presents to my office with complaints of abdominal wall swelling.  He says he noticed it about 4 years ago.  He complains of occasional pain in the swelling.  No nausea or vomiting.  No problem with bowel movements right now.  Patient says that he had a hiatal hernia repair done.  He also tells me that he had surgery for appendectomy which ended up being an exploratory laparotomy.  He does not know whether he had a bowel resection at that time.    History obtained from : patient  Review of Systems   Constitutional: Negative.    HENT: Negative.     Eyes: Negative.    Respiratory: Negative.     Cardiovascular: Negative.    Gastrointestinal: Negative.    Endocrine: Negative.    Genitourinary: Negative.    Musculoskeletal: Negative.    Skin: Negative.    Allergic/Immunologic: Negative.    Neurological: Negative.    Hematological: Negative.    Psychiatric/Behavioral: Negative.       Medical History Reviewed by provider this encounter:  Tobacco  Allergies  Meds  Problems  Med Hx  Surg Hx  Fam Hx       Past Medical History  Please see provation note for full details.       Past Medical History:   Diagnosis Date    Anxiety     Asthma     Peterson's esophagus     Chest pain     Last assessed - 7/31/17    Chronic GERD     Last assessed - 3/11/16    COPD (chronic obstructive pulmonary disease) (HCC)     Hep C w/o coma, chronic (HCC)     treated    Hepatic disease     History of neck problems     Hypercholesterolemia     Hypertension     Kidney disease     Kidney stones      Past Surgical History:   Procedure Laterality Date    APPENDECTOMY      ESOPHAGOGASTRODUODENOSCOPY      HERNIA REPAIR      Resolved     [place] ESOPHAGOGASTRODUODENOSCOPY TRANSORAL DIAGNOSTIC N/A 11/30/2017    Procedure: ESOPHAGOGASTRODUODENOSCOPY (EGD);  Surgeon: Jazmyne Lynn DO;  Location: [facility];  Service: Gastroenterology     Family History   Problem Relation Age of Onset    Hyperlipidemia Mother     Cancer Mother         Malignant Neoplasm     Heart attack Father     Lung cancer Maternal Grandmother     Lung cancer Maternal Grandfather     Heart attack Maternal Grandfather     No Known Problems Paternal Grandmother     No Known Problems Paternal Grandfather     Other Other         Cardiac Disorder    Stroke Other      Current Outpatient Medications on File Prior to Visit   Medication Sig Dispense Refill    albuterol (Ventolin HFA) 90 mcg/act inhaler Inhale 2 puffs every 4 (four) hours as needed for wheezing or shortness of breath 18 g 2    amLODIPine (NORVASC) 5 mg tablet Take 1 tablet (5 mg total) by mouth daily 30 tablet 2    atorvastatin (LIPITOR) 20 mg tablet Take 1 tablet (20 mg total) by mouth daily 30 tablet 2    budesonide-formoterol (SYMBICORT) 160-4.5 mcg/act inhaler Inhale 2 puffs 2 (two) times a day Rinse mouth after use. 10.2 g 5    celecoxib (CeleBREX) 200 mg capsule Take 1 capsule (200 mg total) by mouth 2 (two) times a day 60 capsule 2    omeprazole (PriLOSEC) 40 MG capsule Take 1 capsule (40 mg total) by mouth daily before breakfast 30 capsule 2     No current facility-administered medications  "on file prior to visit.     Allergies   Allergen Reactions    Morphine Swelling      Current Outpatient Medications on File Prior to Visit   Medication Sig Dispense Refill    albuterol (Ventolin HFA) 90 mcg/act inhaler Inhale 2 puffs every 4 (four) hours as needed for wheezing or shortness of breath 18 g 2    amLODIPine (NORVASC) 5 mg tablet Take 1 tablet (5 mg total) by mouth daily 30 tablet 2    atorvastatin (LIPITOR) 20 mg tablet Take 1 tablet (20 mg total) by mouth daily 30 tablet 2    budesonide-formoterol (SYMBICORT) 160-4.5 mcg/act inhaler Inhale 2 puffs 2 (two) times a day Rinse mouth after use. 10.2 g 5    celecoxib (CeleBREX) 200 mg capsule Take 1 capsule (200 mg total) by mouth 2 (two) times a day 60 capsule 2    omeprazole (PriLOSEC) 40 MG capsule Take 1 capsule (40 mg total) by mouth daily before breakfast 30 capsule 2     No current facility-administered medications on file prior to visit.      Social History     Tobacco Use    Smoking status: Every Day     Current packs/day: 0.50     Types: Cigarettes    Smokeless tobacco: Current    Tobacco comments:     since 12 yrs old, Smokes less than 1 pack per day  per [employer]    Vaping Use    Vaping status: Never Used   Substance and Sexual Activity    Alcohol use: Not Currently    Drug use: Yes     Types: Methamphetamines     Comment: Hx of use, states clean since 1/2014, used meth this am 3/22/21    Sexual activity: Yes         Objective     /80 (BP Location: Left arm, Patient Position: Sitting, Cuff Size: Standard)   Pulse 87   Temp 97.6 °F (36.4 °C) (Tympanic)   Ht 6' 1\" (1.854 m)   Wt 95.3 kg (210 lb 3.2 oz)   SpO2 98%   BMI 27.73 kg/m²     Physical Exam  Vitals reviewed.   Constitutional:       Appearance: Normal appearance.   HENT:      Head: Normocephalic and atraumatic.      Mouth/Throat:      Mouth: Mucous membranes are moist.   Eyes:      Pupils: Pupils are equal, round, and reactive to light.   Cardiovascular:      Rate and Rhythm: " Normal rate and regular rhythm.   Pulmonary:      Effort: Pulmonary effort is normal.      Breath sounds: Normal breath sounds.   Abdominal:      General: Abdomen is flat. Bowel sounds are normal.      Palpations: Abdomen is soft.      Hernia: A hernia is present.          Comments: Small incarcerated umbilical hernia.  There is a full midline exploratory laparotomy incision.  There are multiple areas of weakness throughout the incision.   Genitourinary:     Penis: Normal.    Musculoskeletal:         General: Normal range of motion.      Cervical back: Normal range of motion.   Skin:     General: Skin is warm.   Neurological:      General: No focal deficit present.      Mental Status: He is alert.   Psychiatric:         Mood and Affect: Mood normal.       Administrative Statements   I have spent a total time of 35 minutes in caring for this patient on the day of the visit/encounter including Diagnostic results, Prognosis, Risks and benefits of tx options, Instructions for management, Patient and family education, Importance of tx compliance, Risk factor reductions, Impressions, Counseling / Coordination of care, Documenting in the medical record, Reviewing / ordering tests, medicine, procedures  , Obtaining or reviewing history  , and Communicating with other healthcare professionals .

## 2024-10-01 ENCOUNTER — HOSPITAL ENCOUNTER (OUTPATIENT)
Dept: ULTRASOUND IMAGING | Facility: HOSPITAL | Age: 59
Discharge: HOME | End: 2024-10-01
Attending: FAMILY MEDICINE
Payer: MEDICARE

## 2024-10-01 DIAGNOSIS — K74.60 CIRRHOSIS OF LIVER WITHOUT ASCITES, UNSPECIFIED HEPATIC CIRRHOSIS TYPE (HCC): ICD-10-CM

## 2024-10-01 PROCEDURE — 76705 ECHO EXAM OF ABDOMEN: CPT

## 2024-10-10 ENCOUNTER — OFFICE VISIT (OUTPATIENT)
Dept: FAMILY MEDICINE CLINIC | Facility: CLINIC | Age: 59
End: 2024-10-10
Payer: MEDICARE

## 2024-10-10 ENCOUNTER — TELEPHONE (OUTPATIENT)
Dept: FAMILY MEDICINE CLINIC | Facility: CLINIC | Age: 59
End: 2024-10-10

## 2024-10-10 VITALS
BODY MASS INDEX: 27.83 KG/M2 | SYSTOLIC BLOOD PRESSURE: 148 MMHG | DIASTOLIC BLOOD PRESSURE: 90 MMHG | HEART RATE: 80 BPM | OXYGEN SATURATION: 99 % | HEIGHT: 73 IN | WEIGHT: 210 LBS | RESPIRATION RATE: 16 BRPM

## 2024-10-10 DIAGNOSIS — Z23 ENCOUNTER FOR IMMUNIZATION: ICD-10-CM

## 2024-10-10 DIAGNOSIS — K21.9 CHRONIC GERD: ICD-10-CM

## 2024-10-10 DIAGNOSIS — K74.60 CIRRHOSIS OF LIVER WITHOUT ASCITES, UNSPECIFIED HEPATIC CIRRHOSIS TYPE (HCC): ICD-10-CM

## 2024-10-10 DIAGNOSIS — M54.2 NECK PAIN: ICD-10-CM

## 2024-10-10 DIAGNOSIS — E78.5 DYSLIPIDEMIA: ICD-10-CM

## 2024-10-10 DIAGNOSIS — J44.9 CHRONIC OBSTRUCTIVE PULMONARY DISEASE, UNSPECIFIED COPD TYPE (HCC): ICD-10-CM

## 2024-10-10 DIAGNOSIS — I10 PRIMARY HYPERTENSION: Primary | ICD-10-CM

## 2024-10-10 PROCEDURE — 90750 HZV VACC RECOMBINANT IM: CPT | Performed by: FAMILY MEDICINE

## 2024-10-10 PROCEDURE — 90471 IMMUNIZATION ADMIN: CPT | Performed by: FAMILY MEDICINE

## 2024-10-10 PROCEDURE — 99214 OFFICE O/P EST MOD 30 MIN: CPT | Performed by: FAMILY MEDICINE

## 2024-10-10 RX ORDER — AMLODIPINE BESYLATE 10 MG/1
10 TABLET ORAL DAILY
Qty: 30 TABLET | Refills: 3 | Status: SHIPPED | OUTPATIENT
Start: 2024-10-10

## 2024-10-10 RX ORDER — BUDESONIDE AND FORMOTEROL FUMARATE DIHYDRATE 160; 4.5 UG/1; UG/1
2 AEROSOL RESPIRATORY (INHALATION) 2 TIMES DAILY
Qty: 10.2 G | Refills: 3 | Status: SHIPPED | OUTPATIENT
Start: 2024-10-10

## 2024-10-10 NOTE — ASSESSMENT & PLAN NOTE
Recheck lipids. Restart atorvastatin 20 mg daily at bedtime. Advised pt to follow a low cholesterol diet and to exercise on a regular basis.

## 2024-10-10 NOTE — ASSESSMENT & PLAN NOTE
Blood pressure still high. Continue amlodipine and increase to 10 mg daily. Pt advised to follow a low Na diet and to exercise on a regular basis.     Orders:    amLODIPine (NORVASC) 10 mg tablet; Take 1 tablet (10 mg total) by mouth daily

## 2024-10-10 NOTE — ASSESSMENT & PLAN NOTE
Breathing better. Continue Symbicort 160-4.5 2 puffs twice a day and rescue inhaler as needed.       Orders:    budesonide-formoterol (SYMBICORT) 160-4.5 mcg/act inhaler; Inhale 2 puffs 2 (two) times a day Rinse mouth after use.

## 2024-10-10 NOTE — ASSESSMENT & PLAN NOTE
Had Hep C infection in the past and was treated for it. Patient had cirrhosis on previous CT in 2021. Recent LFTs were ok. Ultrasound done in October 2024 and no change.       Orders:    Ambulatory Referral to Gastroenterology; Future

## 2024-10-10 NOTE — TELEPHONE ENCOUNTER
Patient was to come back in 4 weeks for 30 minutes but there was nothing until Nov 22, is that ok?

## 2024-10-10 NOTE — ASSESSMENT & PLAN NOTE
Doing better. Continue omeprazole 40 mg daily and GERD diet. Last EGD was in 2017.       Orders:    Ambulatory Referral to Gastroenterology; Future

## 2024-10-10 NOTE — PROGRESS NOTES
Ambulatory Visit  Name: Daniel Wagner      : 1965      MRN: [id number]  Encounter Provider: Richard Diaz MD  Encounter Date: 10/10/2024   Encounter department: [facility]    Assessment & Plan  Primary hypertension  Blood pressure still high. Continue amlodipine and increase to 10 mg daily. Pt advised to follow a low Na diet and to exercise on a regular basis.     Orders:    amLODIPine (NORVASC) 10 mg tablet; Take 1 tablet (10 mg total) by mouth daily    Dyslipidemia  Recheck lipids. Restart atorvastatin 20 mg daily at bedtime. Advised pt to follow a low cholesterol diet and to exercise on a regular basis.         Chronic obstructive pulmonary disease, unspecified COPD type (HCC)  Breathing better. Continue Symbicort 160-4.5 2 puffs twice a day and rescue inhaler as needed.       Orders:    budesonide-formoterol (SYMBICORT) 160-4.5 mcg/act inhaler; Inhale 2 puffs 2 (two) times a day Rinse mouth after use.    Chronic GERD  Doing better. Continue omeprazole 40 mg daily and GERD diet. Last EGD was in .       Orders:    Ambulatory Referral to Gastroenterology; Future    Cirrhosis of liver without ascites, unspecified hepatic cirrhosis type (HCC)  Had Hep C infection in the past and was treated for it. Patient had cirrhosis on previous CT in . Recent LFTs were ok. Ultrasound done in 2024 and no change.       Orders:    Ambulatory Referral to Gastroenterology; Future    Neck pain  Patient has chronic neck pain. Continue celebrex 200 twice a day.            Encounter for immunization    Orders:    Zoster Vaccine Recombinant IM         History of Present Illness     Patient here for follow-up Hypertension, Hyperlipidemia, COPD, GERD, Cirrhosis, Neck Pain. Patient doing a little better. Has been using inhaler and can breathe a little better. Patient taking blood pressure medication and has been lower. Saw General Surgery and going to have CT for further evaluation. Has less  GERD since on medication. Needs to follow-up with Gastroenterology. Had ultrasound of liver and stable. No change in neck pain. Doesn't want flu shot.       Review of Systems   Constitutional:  Negative for fatigue and unexpected weight change.   Respiratory:  Positive for shortness of breath. Negative for cough.    Cardiovascular:  Negative for chest pain.   Gastrointestinal:  Positive for abdominal pain and nausea. Negative for constipation, diarrhea and vomiting.        GERD   Musculoskeletal:  Positive for neck pain. Negative for arthralgias.   Neurological:  Negative for dizziness and headaches.   Psychiatric/Behavioral:  Negative for dysphoric mood. The patient is not nervous/anxious.      Past Medical History:   Diagnosis Date    Anxiety     Asthma     Peterson's esophagus     Chest pain     Last assessed - 7/31/17    Chronic GERD     Last assessed - 3/11/16    COPD (chronic obstructive pulmonary disease) (HCC)     Hep C w/o coma, chronic (HCC)     treated    Hepatic disease     History of neck problems     Hypercholesterolemia     Hypertension     Kidney disease     Kidney stones      Past Surgical History:   Procedure Laterality Date    APPENDECTOMY      ESOPHAGOGASTRODUODENOSCOPY      HERNIA REPAIR      Resolved     [place] ESOPHAGOGASTRODUODENOSCOPY TRANSORAL DIAGNOSTIC N/A 11/30/2017    Procedure: ESOPHAGOGASTRODUODENOSCOPY (EGD);  Surgeon: Jazmyne Lynn DO;  Location: [facility];  Service: Gastroenterology     Family History   Problem Relation Age of Onset    Hyperlipidemia Mother     Cancer Mother         Malignant Neoplasm     Heart attack Father     Lung cancer Maternal Grandmother     Lung cancer Maternal Grandfather     Heart attack Maternal Grandfather     No Known Problems Paternal Grandmother     No Known Problems Paternal Grandfather     Other Other         Cardiac Disorder    Stroke Other      Social History     Tobacco Use    Smoking status: Every Day     Current packs/day: 0.50     Types:  "Cigarettes    Smokeless tobacco: Current    Tobacco comments:     since 12 yrs old, Smokes less than 1 pack per day  per [employer]    Vaping Use    Vaping status: Never Used   Substance and Sexual Activity    Alcohol use: Not Currently    Drug use: Yes     Types: Methamphetamines     Comment: Hx of use, states clean since 1/2014, used meth this am 3/22/21    Sexual activity: Yes     Current Outpatient Medications on File Prior to Visit   Medication Sig    albuterol (Ventolin HFA) 90 mcg/act inhaler Inhale 2 puffs every 4 (four) hours as needed for wheezing or shortness of breath    atorvastatin (LIPITOR) 20 mg tablet Take 1 tablet (20 mg total) by mouth daily    celecoxib (CeleBREX) 200 mg capsule Take 1 capsule (200 mg total) by mouth 2 (two) times a day    omeprazole (PriLOSEC) 40 MG capsule Take 1 capsule (40 mg total) by mouth daily before breakfast    [DISCONTINUED] amLODIPine (NORVASC) 5 mg tablet Take 1 tablet (5 mg total) by mouth daily    [DISCONTINUED] budesonide-formoterol (SYMBICORT) 160-4.5 mcg/act inhaler Inhale 2 puffs 2 (two) times a day Rinse mouth after use.     Allergies   Allergen Reactions    Morphine Swelling     Immunization History   Administered Date(s) Administered    COVID-19 PFIZER VACCINE 0.3 ML IM 10/15/2021    INFLUENZA 09/03/2015, 08/14/2017, 09/20/2018    Influenza Recombinant Preservative Free Im 09/19/2024    Influenza, injectable, quadrivalent, preservative free 0.5 mL 09/20/2018    Influenza, seasonal, injectable 09/03/2015, 08/14/2017    Pneumococcal Conjugate Vaccine 20-valent (Pcv20), Polysace 09/19/2024    Pneumococcal Polysaccharide PPV23 05/22/2015    Tdap 06/27/2023     Objective     /90 (BP Location: Left arm, Patient Position: Sitting, Cuff Size: Standard)   Pulse 80   Resp 16   Ht 6' 1\" (1.854 m)   Wt 95.3 kg (210 lb)   SpO2 99%   BMI 27.71 kg/m²     Physical Exam  Vitals and nursing note reviewed.   Constitutional:       Appearance: Normal appearance. He " is normal weight.   Neck:      Vascular: No carotid bruit.   Cardiovascular:      Rate and Rhythm: Normal rate and regular rhythm.      Heart sounds: Normal heart sounds. No murmur heard.  Pulmonary:      Effort: Pulmonary effort is normal.      Breath sounds: Normal breath sounds. No wheezing.   Abdominal:      Tenderness: There is abdominal tenderness (mid abd).      Hernia: A hernia (pt has swelling and tenderness around midline incision) is present.   Musculoskeletal:      Cervical back: Normal range of motion and neck supple. Tenderness present. No muscular tenderness.      Right lower leg: No edema.      Left lower leg: No edema.   Lymphadenopathy:      Cervical: No cervical adenopathy.   Neurological:      Mental Status: He is alert.   Psychiatric:         Mood and Affect: Mood normal.         Behavior: Behavior normal.         Thought Content: Thought content normal.         Judgment: Judgment normal.

## 2024-10-21 ENCOUNTER — HOSPITAL ENCOUNTER (OUTPATIENT)
Dept: CT IMAGING | Facility: HOSPITAL | Age: 59
Discharge: HOME | End: 2024-10-21
Attending: SURGERY
Payer: MEDICARE

## 2024-10-21 DIAGNOSIS — K43.9 VENTRAL HERNIA WITHOUT OBSTRUCTION OR GANGRENE: ICD-10-CM

## 2024-10-21 DIAGNOSIS — Z87.898 HISTORY OF ABDOMINAL PAIN: ICD-10-CM

## 2024-10-21 PROCEDURE — 74177 CT ABD & PELVIS W/CONTRAST: CPT

## 2024-10-21 RX ADMIN — IOHEXOL 75 ML: 350 INJECTION, SOLUTION INTRAVENOUS at 09:47

## 2024-10-28 ENCOUNTER — TELEPHONE (OUTPATIENT)
Age: 59
End: 2024-10-28

## 2024-10-28 ENCOUNTER — OFFICE VISIT (OUTPATIENT)
Dept: SURGERY | Facility: CLINIC | Age: 59
End: 2024-10-28
Payer: MEDICARE

## 2024-10-28 VITALS
DIASTOLIC BLOOD PRESSURE: 80 MMHG | HEART RATE: 75 BPM | BODY MASS INDEX: 28.07 KG/M2 | WEIGHT: 211.8 LBS | TEMPERATURE: 96.1 F | OXYGEN SATURATION: 98 % | HEIGHT: 73 IN | SYSTOLIC BLOOD PRESSURE: 150 MMHG

## 2024-10-28 DIAGNOSIS — Z01.812 PRE-OPERATIVE LABORATORY EXAMINATION: Primary | ICD-10-CM

## 2024-10-28 PROCEDURE — 99214 OFFICE O/P EST MOD 30 MIN: CPT | Performed by: SURGERY

## 2024-10-28 NOTE — PROGRESS NOTES
Ambulatory Visit  Name: Daniel Wagner      : 1965      MRN: [id number]  Encounter Provider: Ralph Matos MD  Encounter Date: 10/28/2024   Encounter department: [facility] SURGERY [place]    Assessment & Plan  Pre-operative laboratory examination    Orders:    CBC and differential; Future    Comprehensive metabolic panel; Future    Protime-INR; Future    Type and screen; Future  Patient has a large incisional hernia.  It is present in supraumbilical area.  I explained the procedure of open repair of incisional hernia with mesh.  We talked about possible complications including but not limited to infection, bleeding, injury to the bowel, recurrence.  He verbalized understanding.  He signed the consent.  I am scheduling him for surgery for 2024.  He will need medical clearance.  He will also need bowel prep prior to surgery.    History of Present Illness     Daniel Wagner is a 59 y.o. male who presents to my office today to discuss his CT scan and to schedule his surgery.  He says he feels he is having continuous pain in his hernia.  No complaints of nausea or vomiting.  No change in bowel habits.  Patient has history of exploratory laparotomy for appendicular rupture.  He also has history of open repair of hiatal hernia.    History obtained from : patient  Review of Systems   Constitutional: Negative.    HENT: Negative.     Eyes: Negative.    Respiratory: Negative.     Cardiovascular: Negative.    Gastrointestinal: Negative.    Endocrine: Negative.    Genitourinary: Negative.    Musculoskeletal: Negative.    Skin: Negative.    Allergic/Immunologic: Negative.    Neurological: Negative.    Hematological: Negative.    Psychiatric/Behavioral: Negative.       Medical History Reviewed by provider this encounter:  Tobacco  Allergies  Meds  Problems  Med Hx  Surg Hx  Fam Hx       Past Medical History   Past Medical History:   Diagnosis Date    Anxiety     Asthma     Peterson's esophagus      Chest pain     Last assessed - 7/31/17    Chronic GERD     Last assessed - 3/11/16    COPD (chronic obstructive pulmonary disease) (HCC)     Hep C w/o coma, chronic (HCC)     treated    Hepatic disease     History of neck problems     Hypercholesterolemia     Hypertension     Kidney disease     Kidney stones      Past Surgical History:   Procedure Laterality Date    APPENDECTOMY      ESOPHAGOGASTRODUODENOSCOPY      HERNIA REPAIR      Resolved     [place] ESOPHAGOGASTRODUODENOSCOPY TRANSORAL DIAGNOSTIC N/A 11/30/2017    Procedure: ESOPHAGOGASTRODUODENOSCOPY (EGD);  Surgeon: Jazmyne Lynn DO;  Location: [facility];  Service: Gastroenterology     Family History   Problem Relation Age of Onset    Hyperlipidemia Mother     Cancer Mother         Malignant Neoplasm     Heart attack Father     Lung cancer Maternal Grandmother     Lung cancer Maternal Grandfather     Heart attack Maternal Grandfather     No Known Problems Paternal Grandmother     No Known Problems Paternal Grandfather     Other Other         Cardiac Disorder    Stroke Other      Current Outpatient Medications on File Prior to Visit   Medication Sig Dispense Refill    albuterol (Ventolin HFA) 90 mcg/act inhaler Inhale 2 puffs every 4 (four) hours as needed for wheezing or shortness of breath 18 g 2    amLODIPine (NORVASC) 10 mg tablet Take 1 tablet (10 mg total) by mouth daily 30 tablet 3    atorvastatin (LIPITOR) 20 mg tablet Take 1 tablet (20 mg total) by mouth daily 30 tablet 2    budesonide-formoterol (SYMBICORT) 160-4.5 mcg/act inhaler Inhale 2 puffs 2 (two) times a day Rinse mouth after use. 10.2 g 3    celecoxib (CeleBREX) 200 mg capsule Take 1 capsule (200 mg total) by mouth 2 (two) times a day 60 capsule 2    omeprazole (PriLOSEC) 40 MG capsule Take 1 capsule (40 mg total) by mouth daily before breakfast 30 capsule 2     No current facility-administered medications on file prior to visit.     Allergies   Allergen Reactions    Morphine Swelling     "  Current Outpatient Medications on File Prior to Visit   Medication Sig Dispense Refill    albuterol (Ventolin HFA) 90 mcg/act inhaler Inhale 2 puffs every 4 (four) hours as needed for wheezing or shortness of breath 18 g 2    amLODIPine (NORVASC) 10 mg tablet Take 1 tablet (10 mg total) by mouth daily 30 tablet 3    atorvastatin (LIPITOR) 20 mg tablet Take 1 tablet (20 mg total) by mouth daily 30 tablet 2    budesonide-formoterol (SYMBICORT) 160-4.5 mcg/act inhaler Inhale 2 puffs 2 (two) times a day Rinse mouth after use. 10.2 g 3    celecoxib (CeleBREX) 200 mg capsule Take 1 capsule (200 mg total) by mouth 2 (two) times a day 60 capsule 2    omeprazole (PriLOSEC) 40 MG capsule Take 1 capsule (40 mg total) by mouth daily before breakfast 30 capsule 2     No current facility-administered medications on file prior to visit.      Social History     Tobacco Use    Smoking status: Every Day     Current packs/day: 0.50     Types: Cigarettes    Smokeless tobacco: Current    Tobacco comments:     since 12 yrs old, Smokes less than 1 pack per day  per [employer]    Vaping Use    Vaping status: Never Used   Substance and Sexual Activity    Alcohol use: Not Currently    Drug use: Yes     Types: Methamphetamines     Comment: Hx of use, states clean since 1/2014, used meth this am 3/22/21    Sexual activity: Yes         Objective     /80 (BP Location: Left arm, Patient Position: Sitting, Cuff Size: Standard)   Pulse 75   Temp (!) 96.1 °F (35.6 °C) (Tympanic)   Ht 6' 1\" (1.854 m)   Wt 96.1 kg (211 lb 12.8 oz)   SpO2 98%   BMI 27.94 kg/m²     Physical Exam  Vitals reviewed.   Constitutional:       Appearance: Normal appearance.   HENT:      Head: Normocephalic and atraumatic.      Mouth/Throat:      Mouth: Mucous membranes are moist.   Eyes:      Pupils: Pupils are equal, round, and reactive to light.   Cardiovascular:      Rate and Rhythm: Normal rate and regular rhythm.   Pulmonary:      Effort: Pulmonary effort is " normal.      Breath sounds: Normal breath sounds.   Abdominal:      General: Abdomen is flat. Bowel sounds are normal.      Palpations: Abdomen is soft.      Hernia: A hernia is present.          Comments: Small incarcerated umbilical hernia.  There is a full midline exploratory laparotomy incision.  There are multiple areas of weakness throughout the incision.   Genitourinary:     Penis: Normal.    Musculoskeletal:         General: Normal range of motion.      Cervical back: Normal range of motion.   Skin:     General: Skin is warm.   Neurological:      General: No focal deficit present.      Mental Status: He is alert.   Psychiatric:         Mood and Affect: Mood normal.       Administrative Statements   I have spent a total time of 30 minutes in caring for this patient on the day of the visit/encounter including Diagnostic results, Prognosis, Risks and benefits of tx options, Instructions for management, Patient and family education, Importance of tx compliance, Risk factor reductions, Impressions, Counseling / Coordination of care, Documenting in the medical record, Reviewing / ordering tests, medicine, procedures  , Obtaining or reviewing history  , and Communicating with other healthcare professionals .

## 2024-10-28 NOTE — TELEPHONE ENCOUNTER
Pt went to the lab to have blood work done after his appt today, but the lab called and stated it wasn't in the pt chart.  I called the office and they were going to put the script in right away, they had been waiting for the Dr to sign off.  She also stated that she is putting one in for Type & scan but that one shouldn't be done until day of surgery

## 2024-11-21 ENCOUNTER — OFFICE VISIT (OUTPATIENT)
Dept: FAMILY MEDICINE CLINIC | Facility: CLINIC | Age: 59
End: 2024-11-21
Payer: MEDICARE

## 2024-11-21 ENCOUNTER — CONSULT (OUTPATIENT)
Dept: GASTROENTEROLOGY | Facility: CLINIC | Age: 59
End: 2024-11-21
Payer: MEDICARE

## 2024-11-21 VITALS
DIASTOLIC BLOOD PRESSURE: 93 MMHG | HEART RATE: 83 BPM | BODY MASS INDEX: 29.16 KG/M2 | SYSTOLIC BLOOD PRESSURE: 179 MMHG | WEIGHT: 220 LBS | HEIGHT: 73 IN

## 2024-11-21 VITALS
RESPIRATION RATE: 16 BRPM | SYSTOLIC BLOOD PRESSURE: 136 MMHG | BODY MASS INDEX: 27.7 KG/M2 | WEIGHT: 209 LBS | OXYGEN SATURATION: 99 % | DIASTOLIC BLOOD PRESSURE: 82 MMHG | HEART RATE: 88 BPM | HEIGHT: 73 IN

## 2024-11-21 DIAGNOSIS — K57.90 DIVERTICULAR DISEASE: ICD-10-CM

## 2024-11-21 DIAGNOSIS — K21.9 CHRONIC GERD: ICD-10-CM

## 2024-11-21 DIAGNOSIS — J43.9 PULMONARY EMPHYSEMA, UNSPECIFIED EMPHYSEMA TYPE (HCC): ICD-10-CM

## 2024-11-21 DIAGNOSIS — Z86.19 HISTORY OF HEPATITIS C: ICD-10-CM

## 2024-11-21 DIAGNOSIS — I10 PRIMARY HYPERTENSION: Primary | ICD-10-CM

## 2024-11-21 DIAGNOSIS — K74.60 CIRRHOSIS OF LIVER WITHOUT ASCITES, UNSPECIFIED HEPATIC CIRRHOSIS TYPE (HCC): ICD-10-CM

## 2024-11-21 DIAGNOSIS — K74.60 CIRRHOSIS OF LIVER WITHOUT ASCITES, UNSPECIFIED HEPATIC CIRRHOSIS TYPE (HCC): Primary | ICD-10-CM

## 2024-11-21 DIAGNOSIS — E78.5 DYSLIPIDEMIA: ICD-10-CM

## 2024-11-21 PROCEDURE — 99214 OFFICE O/P EST MOD 30 MIN: CPT | Performed by: FAMILY MEDICINE

## 2024-11-21 PROCEDURE — 99204 OFFICE O/P NEW MOD 45 MIN: CPT | Performed by: INTERNAL MEDICINE

## 2024-11-21 NOTE — ASSESSMENT & PLAN NOTE
Blood pressure ok. Continue amlodipine 10 mg daily. Pt advised to follow a low Na diet and to exercise on a regular basis.

## 2024-11-21 NOTE — PROGRESS NOTES
Name: Daniel Wagner      : 1965      MRN: [id number]  Encounter Provider: Richard Diaz MD  Encounter Date: 2024   Encounter department: [facility]    Assessment & Plan  Primary hypertension  Blood pressure ok. Continue amlodipine 10 mg daily. Pt advised to follow a low Na diet and to exercise on a regular basis.              Dyslipidemia  Recheck lipids. Restart atorvastatin 20 mg daily at bedtime. Advised pt to follow a low cholesterol diet and to exercise on a regular basis.         Pulmonary emphysema, unspecified emphysema type (HCC)  Stable. Continue Symbicort 160-4.5 2 puffs twice a day and rescue inhaler as needed.            Chronic GERD  Stable. Continue omeprazole 40 mg daily and GERD diet. Last EGD was in . Saw Gastroenterology today and going to have EGD and colonoscopy after hernia surgery.            Cirrhosis of liver without ascites, unspecified hepatic cirrhosis type (HCC)  Had Hep C infection in the past and was treated for it. Patient had cirrhosis on previous CT in . Recent LFTs were ok. Ultrasound done in 2024 and no change.            Depression Screening and Follow-up Plan: Patient was screened for depression during today's encounter. They screened negative with a PHQ-2 score of 0.        History of Present Illness     Patient here for follow-up Hypertension, Hyperlipidemia, COPD, GERD, Cirrhosis. Patient doing ok. No chest pain or shortness of breath. Gets headaches at times due to neck pain. Blood pressure has been ok. Going to have surgery for hernias next week. No recent GERD. Saw Gastroenterology earlier today and going to have EGD and colonoscopy when recovers from hernia surgery. Has neck and back pain.       Review of Systems   Constitutional:  Negative for fatigue and unexpected weight change.   Respiratory:  Negative for cough and shortness of breath.    Cardiovascular:  Negative for chest pain.   Gastrointestinal:  Positive  for abdominal pain. Negative for constipation, diarrhea and vomiting.   Musculoskeletal:  Positive for back pain and neck pain. Negative for arthralgias.   Neurological:  Negative for dizziness and headaches.   Psychiatric/Behavioral:  Negative for dysphoric mood. The patient is not nervous/anxious.      Past Medical History:   Diagnosis Date    Anxiety     Asthma     Peterson esophagus     Peterson's esophagus     Chest pain     Last assessed - 7/31/17    Chronic GERD     Last assessed - 3/11/16    COPD (chronic obstructive pulmonary disease) (HCC)     Hep C w/o coma, chronic (HCC)     treated    Hepatic disease     Hernia     History of neck problems     Hypercholesterolemia     Hypertension     Kidney disease     Kidney stones      Past Surgical History:   Procedure Laterality Date    APPENDECTOMY      ESOPHAGOGASTRODUODENOSCOPY      HERNIA REPAIR      Resolved     [place] ESOPHAGOGASTRODUODENOSCOPY TRANSORAL DIAGNOSTIC N/A 11/30/2017    Procedure: ESOPHAGOGASTRODUODENOSCOPY (EGD);  Surgeon: Jazmyne Lynn DO;  Location: [facility];  Service: Gastroenterology     Family History   Problem Relation Age of Onset    Hyperlipidemia Mother     Cancer Mother         Malignant Neoplasm     Heart attack Father     Lung cancer Maternal Grandmother     Lung cancer Maternal Grandfather     Heart attack Maternal Grandfather     No Known Problems Paternal Grandmother     No Known Problems Paternal Grandfather     Other Other         Cardiac Disorder    Stroke Other      Social History     Tobacco Use    Smoking status: Every Day     Current packs/day: 0.50     Types: Cigarettes    Smokeless tobacco: Current    Tobacco comments:     since 12 yrs old, Smokes less than 1 pack per day  per [employer]    Vaping Use    Vaping status: Never Used   Substance and Sexual Activity    Alcohol use: Not Currently    Drug use: Yes     Types: Methamphetamines     Comment: Hx of use, states clean since 1/2014, used meth this am 3/22/21    Sexual  "activity: Yes     Current Outpatient Medications on File Prior to Visit   Medication Sig    albuterol (Ventolin HFA) 90 mcg/act inhaler Inhale 2 puffs every 4 (four) hours as needed for wheezing or shortness of breath    amLODIPine (NORVASC) 10 mg tablet Take 1 tablet (10 mg total) by mouth daily    atorvastatin (LIPITOR) 20 mg tablet Take 1 tablet (20 mg total) by mouth daily    budesonide-formoterol (SYMBICORT) 160-4.5 mcg/act inhaler Inhale 2 puffs 2 (two) times a day Rinse mouth after use.    celecoxib (CeleBREX) 200 mg capsule Take 1 capsule (200 mg total) by mouth 2 (two) times a day    omeprazole (PriLOSEC) 40 MG capsule Take 1 capsule (40 mg total) by mouth daily before breakfast     Allergies   Allergen Reactions    Morphine Swelling     Immunization History   Administered Date(s) Administered    COVID-19 PFIZER VACCINE 0.3 ML IM 10/15/2021    INFLUENZA 09/03/2015, 08/14/2017, 09/20/2018    Influenza Recombinant Preservative Free Im 09/19/2024    Influenza, injectable, quadrivalent, preservative free 0.5 mL 09/20/2018    Influenza, seasonal, injectable 09/03/2015, 08/14/2017    Pneumococcal Conjugate Vaccine 20-valent (Pcv20), Polysace 09/19/2024    Pneumococcal Polysaccharide PPV23 05/22/2015    Tdap 06/27/2023    Zoster Vaccine Recombinant 10/10/2024     Objective   /82 (BP Location: Left arm, Patient Position: Sitting, Cuff Size: Large)   Pulse 88   Resp 16   Ht 6' 1\" (1.854 m)   Wt 94.8 kg (209 lb)   SpO2 99%   BMI 27.57 kg/m²     Physical Exam  Vitals and nursing note reviewed.   Constitutional:       Appearance: Normal appearance. He is normal weight.   Neck:      Vascular: No carotid bruit.   Cardiovascular:      Rate and Rhythm: Normal rate and regular rhythm.      Heart sounds: Normal heart sounds. No murmur heard.  Pulmonary:      Effort: Pulmonary effort is normal.      Breath sounds: Normal breath sounds. No wheezing.   Abdominal:      General: Abdomen is flat.      Palpations: " Abdomen is soft.      Tenderness: There is abdominal tenderness (L abd pain).   Musculoskeletal:      Cervical back: Normal range of motion and neck supple. No muscular tenderness.      Right lower leg: No edema.      Left lower leg: No edema.   Lymphadenopathy:      Cervical: No cervical adenopathy.   Neurological:      Mental Status: He is alert.   Psychiatric:         Mood and Affect: Mood normal.         Behavior: Behavior normal.         Thought Content: Thought content normal.         Judgment: Judgment normal.

## 2024-11-21 NOTE — ASSESSMENT & PLAN NOTE
Had Hep C infection in the past and was treated for it. Patient had cirrhosis on previous CT in 2021. Recent LFTs were ok. Ultrasound done in October 2024 and no change.

## 2024-11-21 NOTE — ASSESSMENT & PLAN NOTE
Orders:    Ambulatory Referral to Gastroenterology    
Of cirrhosis evidence of cirrhosis on recent CT imaging.  Complication of cirrhosis including fatty liver, disease discussed.  Risk of cancer reviewed.  Recent CT imaging did not show any evidence of liver mass//.  Will check labs, schedule EGD colonoscopy after his had hernia surgery and recovered.  He has never had a colonoscopy done.  Will schedule EGD for cirrhosis and GERD and colonoscopy for screening.  Procedure discussed at length with patient.  He will call us in March to schedule.  Will put a recall for the same time as well.  Orders:    Ambulatory Referral to Gastroenterology    CBC and differential; Future    Comprehensive metabolic panel; Future    AFP tumor marker; Future    Protime-INR; Future    Chronic Hepatitis Panel; Future    Hepatitis B surface antibody; Future    Hepatitis A antibody, total; Future    Iron Panel (Includes Ferritin, Iron Sat%, Iron, and TIBC); Future    
 used

## 2024-11-21 NOTE — PROGRESS NOTES
Name: Daniel Wagner      : 1965      MRN: [id number]  Encounter Provider: Kwadwo Drummond MD  Encounter Date: 2024   Encounter department: [facility] GASTROENTEROLOGY [place] [address]  :  Assessment & Plan  Cirrhosis of liver without ascites, unspecified hepatic cirrhosis type (HCC)  Of cirrhosis evidence of cirrhosis on recent CT imaging.  Complication of cirrhosis including fatty liver, disease discussed.  Risk of cancer reviewed.  Recent CT imaging did not show any evidence of liver mass//.  Will check labs, schedule EGD colonoscopy after his had hernia surgery and recovered.  He has never had a colonoscopy done.  Will schedule EGD for cirrhosis and GERD and colonoscopy for screening.  Procedure discussed at length with patient.  He will call us in March to schedule.  Will put a recall for the same time as well.  Orders:    Ambulatory Referral to Gastroenterology    CBC and differential; Future    Comprehensive metabolic panel; Future    AFP tumor marker; Future    Protime-INR; Future    Chronic Hepatitis Panel; Future    Hepatitis B surface antibody; Future    Hepatitis A antibody, total; Future    Iron Panel (Includes Ferritin, Iron Sat%, Iron, and TIBC); Future    Chronic GERD    Orders:    Ambulatory Referral to Gastroenterology    Diverticular disease         History of hepatitis C  History of chronic hepatitis C as per patient successfully treated many years ago.  Will check some labs and status of liver disease.  Orders:    Hepatitis C RNA, Quantitative PCR; Future        History of Present Illness     HPI  Daniel Wagner is a 59 y.o. male who presents patient has history of heartburn acid reflux indigestion, sent for evaluation.  He also gives history of chronic hepatitis C was treated successfully as per patient many years ago, also has evidence of cirrhosis but no GI follow-ups as per patient for several years.  Denies any dysphagia GI bleeding melena hematochezia fever chills  rash or swelling of the feet.  Has some abdominal pain which she attributes to abdominal wall hernias, is going for surgical repair in December.  He has had abdominal surgery done for a ruptured appendix many years ago.  Denies any history of jaundice fluid recommendation in the abdomen or swelling of the feet.  Does not drink any alcohol.  Smokes cigarettes.  Diet medications some of the current and prior records noted.  EGD done 7-8 years ago.      Review of Systems   Constitutional:  Negative for chills and fever.   HENT:  Negative for sore throat and trouble swallowing.    Eyes:  Negative for pain and visual disturbance.   Respiratory:  Negative for cough and shortness of breath.    Cardiovascular:  Negative for chest pain and palpitations.   Gastrointestinal:  Positive for abdominal distention. Negative for abdominal pain, anal bleeding, blood in stool, constipation, diarrhea, nausea, rectal pain and vomiting.   Genitourinary:  Negative for dysuria and hematuria.   Musculoskeletal:  Negative for arthralgias and back pain.   Skin:  Negative for color change and rash.   Neurological:  Negative for seizures and syncope.   All other systems reviewed and are negative.    Past Medical History   Past Medical History:   Diagnosis Date    Anxiety     Asthma     Peterson esophagus     Peterson's esophagus     Chest pain     Last assessed - 7/31/17    Chronic GERD     Last assessed - 3/11/16    COPD (chronic obstructive pulmonary disease) (HCC)     Hep C w/o coma, chronic (HCC)     treated    Hepatic disease     Hernia     History of neck problems     Hypercholesterolemia     Hypertension     Kidney disease     Kidney stones      Past Surgical History:   Procedure Laterality Date    APPENDECTOMY      ESOPHAGOGASTRODUODENOSCOPY      HERNIA REPAIR      Resolved     [place] ESOPHAGOGASTRODUODENOSCOPY TRANSORAL DIAGNOSTIC N/A 11/30/2017    Procedure: ESOPHAGOGASTRODUODENOSCOPY (EGD);  Surgeon: Jazmyne Lynn DO;  Location: [facility];  Service: Gastroenterology     Family History   Problem Relation Age of Onset    Hyperlipidemia Mother     Cancer Mother         Malignant Neoplasm     Heart attack Father     Lung cancer Maternal Grandmother     Lung cancer Maternal Grandfather     Heart attack Maternal Grandfather     No Known Problems Paternal Grandmother     No Known Problems Paternal Grandfather     Other Other         Cardiac Disorder    Stroke Other       reports that he has been smoking cigarettes. He uses smokeless tobacco. He reports that he does not currently use alcohol. He reports current drug use. Drug: Methamphetamines.  Current Outpatient Medications on File Prior to Visit   Medication Sig Dispense Refill    albuterol (Ventolin HFA) 90 mcg/act inhaler Inhale 2 puffs every 4 (four) hours as needed for wheezing or shortness of breath 18 g 2    amLODIPine (NORVASC) 10 mg tablet Take 1 tablet (10 mg total) by mouth daily 30 tablet 3    atorvastatin (LIPITOR) 20 mg tablet Take 1 tablet (20 mg total) by mouth daily 30 tablet 2    budesonide-formoterol (SYMBICORT) 160-4.5 mcg/act inhaler Inhale 2 puffs 2 (two) times a day Rinse mouth after use. 10.2 g 3    celecoxib (CeleBREX) 200 mg capsule Take 1 capsule (200 mg total) by mouth 2 (two) times a day 60 capsule 2    omeprazole (PriLOSEC) 40 MG capsule Take 1 capsule (40 mg total) by mouth daily before breakfast 30 capsule 2     No current facility-administered medications on file prior to visit.     Allergies   Allergen Reactions    Morphine Swelling           Objective   BP (!) 179/93 (Patient Position: Sitting, Cuff Size: Standard)   Pulse 83   Ht 6' 1.2\" (1.859 m)   Wt 99.8 kg (220 lb)   BMI 28.87 kg/m²      Physical Exam  Vitals and nursing note reviewed.   Constitutional:       General: He is not in acute distress.     Appearance: Normal appearance. He is well-developed and normal weight.   HENT:      Head: Normocephalic.      Mouth/Throat:      Mouth: Mucous membranes are " moist.   Eyes:      Conjunctiva/sclera: Conjunctivae normal.   Cardiovascular:      Rate and Rhythm: Normal rate and regular rhythm.      Heart sounds: No murmur heard.  Pulmonary:      Effort: Pulmonary effort is normal. No respiratory distress.      Breath sounds: Normal breath sounds.   Abdominal:      General: There is distension.      Palpations: Abdomen is soft. There is no mass.      Tenderness: There is abdominal tenderness. There is no guarding or rebound.      Hernia: No hernia is present.   Musculoskeletal:         General: No swelling.      Cervical back: Neck supple.      Left lower leg: No edema.   Skin:     General: Skin is warm and dry.      Coloration: Skin is not jaundiced or pale.   Neurological:      Mental Status: He is alert and oriented to person, place, and time.   Psychiatric:         Mood and Affect: Mood normal.

## 2024-11-21 NOTE — ASSESSMENT & PLAN NOTE
Stable. Continue omeprazole 40 mg daily and GERD diet. Last EGD was in 2017. Saw Gastroenterology today and going to have EGD and colonoscopy after hernia surgery.

## 2024-11-25 ENCOUNTER — LAB REQUISITION (OUTPATIENT)
Dept: LAB | Facility: HOSPITAL | Age: 59
End: 2024-11-25
Payer: MEDICARE

## 2024-11-25 ENCOUNTER — TELEPHONE (OUTPATIENT)
Dept: SURGERY | Facility: CLINIC | Age: 59
End: 2024-11-25

## 2024-11-25 ENCOUNTER — APPOINTMENT (OUTPATIENT)
Dept: LAB | Facility: HOSPITAL | Age: 59
End: 2024-11-25
Attending: FAMILY MEDICINE
Payer: MEDICARE

## 2024-11-25 DIAGNOSIS — E78.5 DYSLIPIDEMIA: ICD-10-CM

## 2024-11-25 DIAGNOSIS — K43.9 VENTRAL HERNIA WITHOUT OBSTRUCTION OR GANGRENE: ICD-10-CM

## 2024-11-25 DIAGNOSIS — Z86.19 HISTORY OF HEPATITIS C: ICD-10-CM

## 2024-11-25 DIAGNOSIS — K74.60 CIRRHOSIS OF LIVER WITHOUT ASCITES, UNSPECIFIED HEPATIC CIRRHOSIS TYPE (HCC): ICD-10-CM

## 2024-11-25 DIAGNOSIS — Z01.812 PRE-OPERATIVE LABORATORY EXAMINATION: ICD-10-CM

## 2024-11-25 DIAGNOSIS — Z12.5 PROSTATE CANCER SCREENING: ICD-10-CM

## 2024-11-25 DIAGNOSIS — Z01.818 OTHER SPECIFIED PRE-OPERATIVE EXAMINATION: ICD-10-CM

## 2024-11-25 LAB
ABO GROUP BLD: NORMAL
AFP-TM SERPL-MCNC: 2.24 NG/ML (ref 0–9)
ALBUMIN SERPL BCG-MCNC: 4.4 G/DL (ref 3.5–5)
ALP SERPL-CCNC: 72 U/L (ref 34–104)
ALT SERPL W P-5'-P-CCNC: 32 U/L (ref 7–52)
ANION GAP SERPL CALCULATED.3IONS-SCNC: 8 MMOL/L (ref 4–13)
AST SERPL W P-5'-P-CCNC: 19 U/L (ref 13–39)
BASOPHILS # BLD AUTO: 0.03 THOUSANDS/ΜL (ref 0–0.1)
BASOPHILS NFR BLD AUTO: 1 % (ref 0–1)
BILIRUB SERPL-MCNC: 0.46 MG/DL (ref 0.2–1)
BLD GP AB SCN SERPL QL: NEGATIVE
BUN SERPL-MCNC: 18 MG/DL (ref 5–25)
CALCIUM SERPL-MCNC: 9.9 MG/DL (ref 8.4–10.2)
CHLORIDE SERPL-SCNC: 104 MMOL/L (ref 96–108)
CHOLEST SERPL-MCNC: 175 MG/DL (ref ?–200)
CO2 SERPL-SCNC: 27 MMOL/L (ref 21–32)
CREAT SERPL-MCNC: 1.06 MG/DL (ref 0.6–1.3)
EOSINOPHIL # BLD AUTO: 0.08 THOUSAND/ΜL (ref 0–0.61)
EOSINOPHIL NFR BLD AUTO: 2 % (ref 0–6)
ERYTHROCYTE [DISTWIDTH] IN BLOOD BY AUTOMATED COUNT: 13.4 % (ref 11.6–15.1)
FERRITIN SERPL-MCNC: 80 NG/ML (ref 24–336)
GFR SERPL CREATININE-BSD FRML MDRD: 75 ML/MIN/1.73SQ M
GLUCOSE SERPL-MCNC: 126 MG/DL (ref 65–140)
HAV AB SER QL IA: REACTIVE
HBV CORE AB SER QL: REACTIVE
HBV CORE IGM SER QL: ABNORMAL
HBV SURFACE AB SER-ACNC: 51.4 MIU/ML
HBV SURFACE AG SER QL: ABNORMAL
HCT VFR BLD AUTO: 44.3 % (ref 36.5–49.3)
HCV AB SER QL: REACTIVE
HDLC SERPL-MCNC: 47 MG/DL
HGB BLD-MCNC: 14.2 G/DL (ref 12–17)
IMM GRANULOCYTES # BLD AUTO: 0.02 THOUSAND/UL (ref 0–0.2)
IMM GRANULOCYTES NFR BLD AUTO: 0 % (ref 0–2)
INR PPP: 1.08 (ref 0.85–1.19)
IRON SATN MFR SERPL: 26 % (ref 15–50)
IRON SERPL-MCNC: 76 UG/DL (ref 50–212)
LDLC SERPL CALC-MCNC: 112 MG/DL (ref 0–100)
LYMPHOCYTES # BLD AUTO: 1.08 THOUSANDS/ΜL (ref 0.6–4.47)
LYMPHOCYTES NFR BLD AUTO: 20 % (ref 14–44)
MCH RBC QN AUTO: 30.5 PG (ref 26.8–34.3)
MCHC RBC AUTO-ENTMCNC: 32.1 G/DL (ref 31.4–37.4)
MCV RBC AUTO: 95 FL (ref 82–98)
MONOCYTES # BLD AUTO: 0.53 THOUSAND/ΜL (ref 0.17–1.22)
MONOCYTES NFR BLD AUTO: 10 % (ref 4–12)
NEUTROPHILS # BLD AUTO: 3.73 THOUSANDS/ΜL (ref 1.85–7.62)
NEUTS SEG NFR BLD AUTO: 67 % (ref 43–75)
NONHDLC SERPL-MCNC: 128 MG/DL
NRBC BLD AUTO-RTO: 0 /100 WBCS
PLATELET # BLD AUTO: 184 THOUSANDS/UL (ref 149–390)
PMV BLD AUTO: 9.6 FL (ref 8.9–12.7)
POTASSIUM SERPL-SCNC: 3.6 MMOL/L (ref 3.5–5.3)
PROT SERPL-MCNC: 7.6 G/DL (ref 6.4–8.4)
PROTHROMBIN TIME: 14.4 SECONDS (ref 12.3–15)
PSA SERPL-MCNC: 0.26 NG/ML (ref 0–4)
RBC # BLD AUTO: 4.66 MILLION/UL (ref 3.88–5.62)
RH BLD: POSITIVE
SODIUM SERPL-SCNC: 139 MMOL/L (ref 135–147)
SPECIMEN EXPIRATION DATE: NORMAL
TIBC SERPL-MCNC: 291 UG/DL (ref 250–450)
TRIGL SERPL-MCNC: 78 MG/DL (ref ?–150)
UIBC SERPL-MCNC: 215 UG/DL (ref 155–355)
WBC # BLD AUTO: 5.47 THOUSAND/UL (ref 4.31–10.16)

## 2024-11-25 PROCEDURE — 83540 ASSAY OF IRON: CPT

## 2024-11-25 PROCEDURE — 87521 HEPATITIS C PROBE&RVRS TRNSC: CPT

## 2024-11-25 PROCEDURE — 80053 COMPREHEN METABOLIC PANEL: CPT

## 2024-11-25 PROCEDURE — 85610 PROTHROMBIN TIME: CPT

## 2024-11-25 PROCEDURE — 36415 COLL VENOUS BLD VENIPUNCTURE: CPT

## 2024-11-25 PROCEDURE — G0103 PSA SCREENING: HCPCS

## 2024-11-25 PROCEDURE — 86705 HEP B CORE ANTIBODY IGM: CPT

## 2024-11-25 PROCEDURE — 82105 ALPHA-FETOPROTEIN SERUM: CPT

## 2024-11-25 PROCEDURE — 86706 HEP B SURFACE ANTIBODY: CPT

## 2024-11-25 PROCEDURE — 82728 ASSAY OF FERRITIN: CPT

## 2024-11-25 PROCEDURE — 87522 HEPATITIS C REVRS TRNSCRPJ: CPT

## 2024-11-25 PROCEDURE — 80061 LIPID PANEL: CPT

## 2024-11-25 PROCEDURE — 86803 HEPATITIS C AB TEST: CPT

## 2024-11-25 PROCEDURE — 86901 BLOOD TYPING SEROLOGIC RH(D): CPT | Performed by: SURGERY

## 2024-11-25 PROCEDURE — 83550 IRON BINDING TEST: CPT

## 2024-11-25 PROCEDURE — 86708 HEPATITIS A ANTIBODY: CPT

## 2024-11-25 PROCEDURE — 86850 RBC ANTIBODY SCREEN: CPT | Performed by: SURGERY

## 2024-11-25 PROCEDURE — 86900 BLOOD TYPING SEROLOGIC ABO: CPT | Performed by: SURGERY

## 2024-11-25 PROCEDURE — 86704 HEP B CORE ANTIBODY TOTAL: CPT

## 2024-11-25 PROCEDURE — 85025 COMPLETE CBC W/AUTO DIFF WBC: CPT

## 2024-11-25 PROCEDURE — 87340 HEPATITIS B SURFACE AG IA: CPT

## 2024-11-25 NOTE — TELEPHONE ENCOUNTER
Patient stopped in to let me know that he had his labs done. He said that he is in a lot of pain and feels the hernia is getting worse and squeezing. He is asking if there is a sooner date for surgery and if he can have pain meds. Said Tylenol isn't cutting it.     I told him Dr. Matos was out this week and I would forward this onto both Dr. Matos and Dr. Bloch.     Patient was advised that if the pain was really bad and he couldn't handle it, he needed to go to the ER.

## 2024-11-26 ENCOUNTER — RESULTS FOLLOW-UP (OUTPATIENT)
Dept: GASTROENTEROLOGY | Facility: CLINIC | Age: 59
End: 2024-11-26

## 2024-11-26 DIAGNOSIS — Z86.19 HISTORY OF HEPATITIS C: Primary | ICD-10-CM

## 2024-11-27 LAB — HCV RNA SERPL NAA+PROBE-ACNC: NOT DETECTED K[IU]/ML

## 2024-12-03 NOTE — PRE-PROCEDURE INSTRUCTIONS
Pre-Surgery Instructions:   Medication Instructions    albuterol (Ventolin HFA) 90 mcg/act inhaler Uses PRN- OK to take day of surgery    amLODIPine (NORVASC) 10 mg tablet Take night before surgery    atorvastatin (LIPITOR) 20 mg tablet Take night before surgery    budesonide-formoterol (SYMBICORT) 160-4.5 mcg/act inhaler Take day of surgery.    celecoxib (CeleBREX) 200 mg capsule Stop taking 3 days prior to surgery.    omeprazole (PriLOSEC) 40 MG capsule Take day of surgery.   Bowel prep written/verbal instructions provided by the surgeon's office. Pt advised to contact the surgeon's office with any questions/concerns.    Medication instructions for day surgery reviewed. Please use only a sip of water to take your instructed medications. Avoid all over the counter vitamins, supplements and NSAIDS for one week prior to surgery per anesthesia guidelines. Tylenol is ok to take as needed.     You will receive a call one business day prior to surgery with an arrival time and hospital directions. If your surgery is scheduled on a Monday, the hospital will be calling you on the Friday prior to your surgery. If you have not heard from anyone by 8pm, please call the hospital supervisor through the hospital  at [phone]. ([place] [phone] or [place] [phone]).    Do not eat or drink anything after midnight the night before your surgery, including candy, mints, lifesavers, or chewing gum. Do not drink alcohol 24hrs before your surgery. Try not to smoke at least 24hrs before your surgery.       Follow the pre surgery showering instructions as listed in the “My Surgical Experience Booklet” or otherwise provided by your surgeon's office. Do not use a blade to shave the surgical area 1 week before surgery. It is okay to use a clean electric clippers up to 24 hours before surgery. Do not apply any lotions, creams, including makeup, cologne, deodorant, or perfumes after showering on the day of your surgery. Do  not use dry shampoo, hair spray, hair gel, or any type of hair products.     No contact lenses, eye make-up, or artificial eyelashes. Remove nail polish, including gel polish, and any artificial, gel, or acrylic nails if possible. Remove all jewelry including rings and body piercing jewelry.     Wear causal clothing that is easy to take on and off. Consider your type of surgery.    Keep any valuables, jewelry, piercings at home. Please bring any specially ordered equipment (sling, braces) if indicated.    Arrange for a responsible person to drive you to and from the hospital on the day of your surgery. Please confirm the visitor policy for the day of your procedure when you receive your phone call with an arrival time.     Call the surgeon's office with any new illnesses, exposures, or additional questions prior to surgery.    Please reference your “My Surgical Experience Booklet” for additional information to prepare for your upcoming surgery.

## 2024-12-12 ENCOUNTER — HOSPITAL ENCOUNTER (OUTPATIENT)
Facility: HOSPITAL | Age: 59
Setting detail: OUTPATIENT SURGERY
Discharge: HOME | End: 2024-12-12
Attending: SURGERY | Admitting: SURGERY
Payer: MEDICARE

## 2024-12-12 VITALS
HEART RATE: 80 BPM | WEIGHT: 202 LBS | OXYGEN SATURATION: 98 % | TEMPERATURE: 97.6 F | SYSTOLIC BLOOD PRESSURE: 142 MMHG | BODY MASS INDEX: 26.77 KG/M2 | DIASTOLIC BLOOD PRESSURE: 79 MMHG | HEIGHT: 73 IN | RESPIRATION RATE: 16 BRPM

## 2024-12-12 DIAGNOSIS — R39.198 DIFFICULTY IN URINATION: ICD-10-CM

## 2024-12-12 DIAGNOSIS — L08.9 RIGHT FOOT INFECTION: Primary | ICD-10-CM

## 2024-12-12 DIAGNOSIS — R35.0 URINARY FREQUENCY: ICD-10-CM

## 2024-12-12 DIAGNOSIS — L08.9 FOOT INFECTION: Primary | ICD-10-CM

## 2024-12-12 PROBLEM — K56.50 SMALL BOWEL OBSTRUCTION DUE TO ADHESIONS (HCC): Status: RESOLVED | Noted: 2021-03-24 | Resolved: 2024-12-12

## 2024-12-12 LAB
ABO GROUP BLD: NORMAL
RH BLD: POSITIVE

## 2024-12-12 RX ORDER — TAMSULOSIN HYDROCHLORIDE 0.4 MG/1
0.4 CAPSULE ORAL
Qty: 30 CAPSULE | Refills: 0 | Status: SHIPPED | OUTPATIENT
Start: 2024-12-12

## 2024-12-12 RX ORDER — ONDANSETRON 2 MG/ML
4 INJECTION INTRAMUSCULAR; INTRAVENOUS ONCE AS NEEDED
OUTPATIENT
Start: 2024-12-12

## 2024-12-12 RX ORDER — METRONIDAZOLE 500 MG/100ML
500 INJECTION, SOLUTION INTRAVENOUS ONCE
Status: DISCONTINUED | OUTPATIENT
Start: 2024-12-12 | End: 2024-12-12 | Stop reason: HOSPADM

## 2024-12-12 RX ORDER — FENTANYL CITRATE/PF 50 MCG/ML
50 SYRINGE (ML) INJECTION
Refills: 0 | OUTPATIENT
Start: 2024-12-12

## 2024-12-12 RX ORDER — SODIUM CHLORIDE, SODIUM LACTATE, POTASSIUM CHLORIDE, CALCIUM CHLORIDE 600; 310; 30; 20 MG/100ML; MG/100ML; MG/100ML; MG/100ML
125 INJECTION, SOLUTION INTRAVENOUS CONTINUOUS
Status: DISCONTINUED | OUTPATIENT
Start: 2024-12-12 | End: 2024-12-12 | Stop reason: HOSPADM

## 2024-12-12 RX ORDER — HYDROMORPHONE HCL/PF 1 MG/ML
0.5 SYRINGE (ML) INJECTION
Refills: 0 | OUTPATIENT
Start: 2024-12-12

## 2024-12-12 RX ORDER — CEFAZOLIN SODIUM 2 G/50ML
2000 SOLUTION INTRAVENOUS ONCE
Status: DISCONTINUED | OUTPATIENT
Start: 2024-12-12 | End: 2024-12-12 | Stop reason: HOSPADM

## 2024-12-12 RX ORDER — LIDOCAINE HYDROCHLORIDE 10 MG/ML
0.5 INJECTION, SOLUTION EPIDURAL; INFILTRATION; INTRACAUDAL; PERINEURAL ONCE AS NEEDED
Status: DISCONTINUED | OUTPATIENT
Start: 2024-12-12 | End: 2024-12-12 | Stop reason: HOSPADM

## 2024-12-12 RX ADMIN — SODIUM CHLORIDE, SODIUM LACTATE, POTASSIUM CHLORIDE, AND CALCIUM CHLORIDE 125 ML/HR: .6; .31; .03; .02 INJECTION, SOLUTION INTRAVENOUS at 09:35

## 2024-12-12 NOTE — QUICK NOTE
Patient came to the hospital today for an elective repair of incisional hernia.  Patient brought to or noticed that he has been having pain and redness of his right foot for last 3 weeks.  He said that it started as a blister and become worse.  He also complains of foul-smelling urine, difficulty in passing urine.  Feeling of incomplete evacuation of urine.  No nausea vomiting.  No complaints of pain at the hernia site.    On examination he is alert awake oriented vitals are stable abdomen is soft nontender incisional hernia present in the supraumbilical area no suprapubic tenderness.  Right foot pulses are good there is some erythema over the dorsal aspect of the foot.  Induration present.    I told him that we will have to cancel the surgery as he has high risk of infection of the mesh.  I am going to start him on oral antibiotics.  I am also going to start him on Flomax for urinary symptoms.  He will be discharged home.  An urgent referral to podiatry and urology has been placed.

## 2024-12-12 NOTE — DISCHARGE INSTRUCTIONS
Bowel Obstruction   WHAT YOU NEED TO KNOW:   A bowel obstruction occurs when your large or small intestine is completely or partly blocked  The blockage prevents food and waste from passing through normally  DISCHARGE INSTRUCTIONS:   Follow up with your healthcare provider as directed:  Write down your questions so you remember to ask them during your visits  Contact your healthcare provider if:   · You have nausea and are vomiting  · Your abdomen is enlarged  · You cannot pass a bowel movement or gas  · You lose weight without trying  · You have blood in your bowel movement  · You have questions or concerns about your condition or care  Seek care immediately or call 911 if:   · You have severe abdominal pain that does not get better  · Your heart is beating faster than normal for you  · You have a fever  © Copyright [address] Information is for End User's use only and may not be sold, redistributed or otherwise used for commercial purposes  All illustrations and images included in CareNotes® are the copyrighted property of A D A M , Inc  or [address] [place], [place] [address]   The above information is an  only  It is not intended as medical advice for individual conditions or treatments  Talk to your doctor, nurse or pharmacist before following any medical regimen to see if it is safe and effective for you 
Opt out

## 2024-12-18 ENCOUNTER — TELEPHONE (OUTPATIENT)
Age: 59
End: 2024-12-18

## 2024-12-18 NOTE — TELEPHONE ENCOUNTER
New Patient    What is the reason for the patient’s appointment?: NP- ref for urinary frequency     What office location does the patient prefer?: [place]    Does patient have Imaging/Lab Results:    PSA last done on 11/25/24    Have patient records been requested?:  If No, are the records showing in Epic: Records in epic.       HISTORY:   Has the patient had any previous Urologist(s)?: Unsure     Scheduled: 12/31/24 with Betty at our [place] office.

## 2024-12-18 NOTE — TELEPHONE ENCOUNTER
Hello,  Please advise if the following patient can be forced onto the schedule:  Patient: Daniel Wagner  INSURANCE: [employer]  Call back #: [phone]   Reason for appointment: Foot Infection, needs asap appointment as this podiatry appointment will determine when he can have surgery .  Requested doctor/location: Would prefer [place]  okay with [facility] , but will go to [place] if needed    Thank you in advance!

## 2024-12-18 NOTE — TELEPHONE ENCOUNTER
I called cade and left him a voicemail. I had an opening for 12/23/2024 @245 pm. IF PATIENT CALLS BACK AND APPOINTMENT DOESN'T WORK PLEASE LET US KNOW ASAP. PT is an urgent matter.

## 2024-12-19 ENCOUNTER — CLINICAL SUPPORT (OUTPATIENT)
Dept: FAMILY MEDICINE CLINIC | Facility: CLINIC | Age: 59
End: 2024-12-19
Payer: MEDICARE

## 2024-12-19 DIAGNOSIS — Z23 ENCOUNTER FOR IMMUNIZATION: Primary | ICD-10-CM

## 2024-12-19 PROCEDURE — 90750 HZV VACC RECOMBINANT IM: CPT | Performed by: FAMILY MEDICINE

## 2024-12-19 PROCEDURE — 90471 IMMUNIZATION ADMIN: CPT | Performed by: FAMILY MEDICINE

## 2024-12-23 ENCOUNTER — OFFICE VISIT (OUTPATIENT)
Dept: PODIATRY | Facility: CLINIC | Age: 59
End: 2024-12-23
Payer: MEDICARE

## 2024-12-23 ENCOUNTER — APPOINTMENT (OUTPATIENT)
Dept: LAB | Facility: HOSPITAL | Age: 59
End: 2024-12-23
Payer: MEDICARE

## 2024-12-23 VITALS — BODY MASS INDEX: 26.9 KG/M2 | WEIGHT: 203 LBS | HEIGHT: 73 IN | RESPIRATION RATE: 18 BRPM

## 2024-12-23 DIAGNOSIS — L08.9 RIGHT FOOT INFECTION: ICD-10-CM

## 2024-12-23 DIAGNOSIS — L03.115 CELLULITIS OF RIGHT FOOT: Primary | ICD-10-CM

## 2024-12-23 PROCEDURE — 87070 CULTURE OTHR SPECIMN AEROBIC: CPT | Performed by: PODIATRIST

## 2024-12-23 PROCEDURE — 87147 CULTURE TYPE IMMUNOLOGIC: CPT | Performed by: PODIATRIST

## 2024-12-23 PROCEDURE — 87205 SMEAR GRAM STAIN: CPT | Performed by: PODIATRIST

## 2024-12-23 PROCEDURE — 87186 SC STD MICRODIL/AGAR DIL: CPT | Performed by: PODIATRIST

## 2024-12-23 PROCEDURE — 99244 OFF/OP CNSLTJ NEW/EST MOD 40: CPT | Performed by: PODIATRIST

## 2024-12-23 RX ORDER — DOXYCYCLINE 100 MG/1
100 CAPSULE ORAL EVERY 12 HOURS SCHEDULED
Qty: 20 CAPSULE | Refills: 0 | Status: SHIPPED | OUTPATIENT
Start: 2024-12-23 | End: 2025-01-02

## 2024-12-23 RX ORDER — MUPIROCIN 20 MG/G
OINTMENT TOPICAL 2 TIMES DAILY
Qty: 22 G | Refills: 0 | Status: SHIPPED | OUTPATIENT
Start: 2024-12-23

## 2024-12-23 NOTE — PROGRESS NOTES
Name: Daniel Wagner      : 1965      MRN: [id number]  Encounter Provider: Daniel Byrd DPM  Encounter Date: 2024   Encounter department: [facility] [place]    Culture and Gram stain taken from the right foot wound.    Explained the patient that he still has an infection though it seems to be improving.  He was placed on doxycycline 100 mg twice daily for 10 days and Bactroban ointment twice daily.  Reappoint 10 days.  :  Assessment & Plan  Right foot infection    Orders:    Ambulatory Referral to Podiatry    Wound culture and Gram stain; Future    mupirocin (BACTROBAN) 2 % ointment; Apply topically 2 (two) times a day    Cellulitis of right foot    Orders:    doxycycline hyclate (VIBRAMYCIN) 100 mg capsule; Take 1 capsule (100 mg total) by mouth every 12 (twelve) hours for 10 days        History of Present Illness   HPI  Daniel Wagner is a 59 y.o. male who presents for assessment of his right foot.  On , patient was scheduled for a hernia repair.  The surgery was canceled due to a right foot infection.  Patient states that he was placed on antibiotics but he does not know which antibiotic he was placed on.  He states that the foot has improved but it is still red and there is still an open area.  He states that the open area drains discharged creamy but tinged with blood.    The patient states that when the infection first began, he noted when using the kickstand on his motorcycle.  The foot became swollen and red with an apparent nidus of infection that was draining.  The patient states that he was placed on antibiotics but does not know what he was placed on and it does not seem to be in the chart.  There has been improvement but the open area persists along with mild erythema.    Patient has a protracted medical history.  He states that he is prediabetic but there is a history of hep C and renal damage.      Review of Systems   Genitourinary:         Cirrhosis of the liver;  "chronic GERD   Musculoskeletal:  Positive for neck pain.   Neurological:         Cervical radiculopathy              Objective   Resp 18   Ht 6' 1\" (1.854 m)   Wt 92.1 kg (203 lb)   BMI 26.78 kg/m²      Physical Exam  Constitutional:       Appearance: Normal appearance.   Cardiovascular:      Pulses: Normal pulses.   Musculoskeletal:         General: Swelling present.   Skin:     Findings: Erythema present.      Comments: Right foot is mildly erythematous but there is a nidus that is draining with a scab on it dorsal lateral aspect of the foot.  Minimal pain with palpation.   Neurological:      General: No focal deficit present.      Mental Status: He is oriented to person, place, and time.               "

## 2024-12-25 LAB
BACTERIA WND AEROBE CULT: ABNORMAL
GRAM STN SPEC: ABNORMAL

## 2024-12-31 ENCOUNTER — CONSULT (OUTPATIENT)
Dept: UROLOGY | Facility: CLINIC | Age: 59
End: 2024-12-31
Payer: MEDICARE

## 2024-12-31 VITALS
OXYGEN SATURATION: 99 % | HEART RATE: 80 BPM | HEIGHT: 73 IN | DIASTOLIC BLOOD PRESSURE: 76 MMHG | WEIGHT: 216 LBS | SYSTOLIC BLOOD PRESSURE: 158 MMHG | BODY MASS INDEX: 28.63 KG/M2

## 2024-12-31 DIAGNOSIS — R35.0 URINARY FREQUENCY: ICD-10-CM

## 2024-12-31 LAB
POST-VOID RESIDUAL VOLUME, ML POC: 14 ML
SL AMB  POCT GLUCOSE, UA: NORMAL
SL AMB LEUKOCYTE ESTERASE,UA: NORMAL
SL AMB POCT BILIRUBIN,UA: NORMAL
SL AMB POCT BLOOD,UA: NORMAL
SL AMB POCT CLARITY,UA: CLEAR
SL AMB POCT COLOR,UA: YELLOW
SL AMB POCT KETONES,UA: NORMAL
SL AMB POCT NITRITE,UA: NORMAL
SL AMB POCT PH,UA: 6
SL AMB POCT SPECIFIC GRAVITY,UA: 1.03
SL AMB POCT URINE PROTEIN: NORMAL
SL AMB POCT UROBILINOGEN: 0.2

## 2024-12-31 PROCEDURE — 81002 URINALYSIS NONAUTO W/O SCOPE: CPT

## 2024-12-31 PROCEDURE — 51798 US URINE CAPACITY MEASURE: CPT

## 2024-12-31 PROCEDURE — 99203 OFFICE O/P NEW LOW 30 MIN: CPT

## 2024-12-31 NOTE — PROGRESS NOTES
12/31/2024      No chief complaint on file.        Assessment and Plan    59 y.o. male managed by NEW PATIENT    Urinary Urgency  -Patient is currently asymptomatic at this time.   -urine dip negative for leukocytes, nitrites, and blood.   -PVR 14 mls indicates excellent bladder emptying.   -Continue tamsulosin 0.4 mg once daily.  -Recommended increasing hydration and decreasing soda intake. Patient states he did cut down to 6 cans of coke per day compared to 10-12 cans of soda.  -Daily bowel regimen to reduce constipation.  -Patient states he would like to follow-up with us on an as-needed basis. All questions addressed. Please do not hesitate to reach out to us with any future questions or concerns.    History of Present Illness  Daniel Wagner is a 59 y.o. male here for evaluation of urinary urgency. Patient has pmhx of HTN, COPD, GERD, cirrhosis, thrombocytopenia. Cervical spondylosis, cervical stenosis, dyslipidemia.     Patient states that about 3 weeks ago, he was having urinary urgency. He states he has been on flomax for about two months, and this has helped a little. He does state he has some post void dribbling. He denies dysuria, flank pain, frequency, gross hematuria. Patient also states that he noticed his urine was foul smelling, but states he was not drinking a lot of water. He states he started drinking more water recently and noticed that the odor in his urine went away. Patient is asymptomatic today. States has constipation, sometimes not going for a week.      Denies recurrent UTIs, states has hx of kidney stones, last 2-3 years ago. Denies surgical interventions. Patient had a CT abd pelvis w/contrast (10/21/24) showing unremarkable kidneys, no hydronephrosis. Unremarkable urinary bladder.        PSA 0.263 (11/25/24). Denies family hx of  malignancy       Review of Systems   Constitutional:  Negative for activity change, chills, fatigue and fever.   HENT:  Negative for congestion, rhinorrhea  "and sore throat.    Eyes:  Negative for photophobia, redness and visual disturbance.   Respiratory:  Negative for cough, shortness of breath and wheezing.    Cardiovascular:  Negative for chest pain, palpitations and leg swelling.   Gastrointestinal:  Positive for constipation. Negative for abdominal pain, diarrhea, nausea and vomiting.   Genitourinary:  Negative for difficulty urinating, dysuria, flank pain, frequency, hematuria and urgency.   Neurological:  Negative for weakness, light-headedness and headaches.                Vitals  Vitals:    12/31/24 0929   BP: 158/76   BP Location: Left arm   Patient Position: Sitting   Cuff Size: Large   Pulse: 80   SpO2: 99%   Weight: 98 kg (216 lb)   Height: 6' 1\" (1.854 m)       Physical Exam  Constitutional:       Appearance: Normal appearance. He is not toxic-appearing.   HENT:      Head: Normocephalic.      Mouth/Throat:      Pharynx: Oropharynx is clear.   Eyes:      Extraocular Movements: Extraocular movements intact.      Pupils: Pupils are equal, round, and reactive to light.   Pulmonary:      Effort: Pulmonary effort is normal. No respiratory distress.   Musculoskeletal:         General: Normal range of motion.      Cervical back: Normal range of motion.   Neurological:      Mental Status: He is alert and oriented to person, place, and time. Mental status is at baseline.      Gait: Gait normal.   Psychiatric:         Mood and Affect: Mood normal.         Behavior: Behavior normal.         Thought Content: Thought content normal.         Judgment: Judgment normal.           Past History  Past Medical History:   Diagnosis Date    Anxiety     Asthma     Peterson esophagus     Peterson's esophagus     Bowel obstruction (HCC)     Cervical radiculopathy     Chest pain     Last assessed - 7/31/17    Chronic GERD     Last assessed - 3/11/16    COPD (chronic obstructive pulmonary disease) (HCC)     Hep C w/o coma, chronic (HCC)     treated    Hepatic disease     Cirrhosis    " Hernia     History of neck problems     Hypercholesterolemia     Hypertension     Kidney disease     Kidney stones     Paresthesia of upper extremity     SOB (shortness of breath) on exertion     Wears glasses      Social History     Socioeconomic History    Marital status: Single     Spouse name: None    Number of children: 4    Years of education: None    Highest education level: None   Occupational History    Occupation: Permanent Disability      Comment: [employer]-not looking for work    Tobacco Use    Smoking status: Every Day     Current packs/day: 0.50     Average packs/day: 0.5 packs/day for 48.0 years (24.0 ttl pk-yrs)     Types: Cigarettes     Start date: 1977    Smokeless tobacco: Current    Tobacco comments:     since 12 yrs old, Smokes less than 1 pack per day  per [employer]    Vaping Use    Vaping status: Never Used   Substance and Sexual Activity    Alcohol use: Not Currently    Drug use: Yes     Types: Methamphetamines     Comment: last use 5 days ago    Sexual activity: Yes   Other Topics Concern    None   Social History Narrative    On permanent disability    Unemployed, not looking for work          Social Drivers of Health     Financial Resource Strain: Not on file   Food Insecurity: Not on file   Transportation Needs: Not on file   Physical Activity: Not on file   Stress: Not on file   Social Connections: Not on file   Intimate Partner Violence: Not on file   Housing Stability: Not on file     Social History     Tobacco Use   Smoking Status Every Day    Current packs/day: 0.50    Average packs/day: 0.5 packs/day for 48.0 years (24.0 ttl pk-yrs)    Types: Cigarettes    Start date: 1977   Smokeless Tobacco Current   Tobacco Comments    since 12 yrs old, Smokes less than 1 pack per day  per [employer]      Family History   Problem Relation Age of Onset    Hyperlipidemia Mother     Cancer Mother         Malignant Neoplasm     Heart attack Father     Lung cancer Maternal Grandmother     Lung cancer  Maternal Grandfather     Heart attack Maternal Grandfather     No Known Problems Paternal Grandmother     No Known Problems Paternal Grandfather     Other Other         Cardiac Disorder    Stroke Other        The following portions of the patient's history were reviewed and updated as appropriate: allergies, current medications, past medical history, past social history, past surgical history and problem list.    Results  Recent Results (from the past hour)   POCT urine dip    Collection Time: 12/31/24  9:32 AM   Result Value Ref Range    LEUKOCYTE ESTERASE,UA -     NITRITE,UA -     SL AMB POCT UROBILINOGEN 0.2     POCT URINE PROTEIN -      PH,UA 6.0     BLOOD,UA -     SPECIFIC GRAVITY,UA 1.030     KETONES,UA -     BILIRUBIN,UA -     GLUCOSE, UA -      COLOR,UA yellow     CLARITY,UA clear    POCT Measure PVR    Collection Time: 12/31/24  9:36 AM   Result Value Ref Range    POST-VOID RESIDUAL VOLUME, ML POC 14 mL   ]  Lab Results   Component Value Date    PSA 0.263 11/25/2024     Lab Results   Component Value Date    GLUCOSE 105 05/21/2016    CALCIUM 9.9 11/25/2024     12/04/2015    K 3.6 11/25/2024    CO2 27 11/25/2024     11/25/2024    BUN 18 11/25/2024    CREATININE 1.06 11/25/2024     Lab Results   Component Value Date    WBC 5.47 11/25/2024    HGB 14.2 11/25/2024    HCT 44.3 11/25/2024    MCV 95 11/25/2024     11/25/2024       JOS Freeman

## 2025-01-06 ENCOUNTER — TELEPHONE (OUTPATIENT)
Dept: INTERNAL MEDICINE CLINIC | Facility: CLINIC | Age: 60
End: 2025-01-06

## 2025-01-06 ENCOUNTER — OFFICE VISIT (OUTPATIENT)
Dept: PODIATRY | Facility: CLINIC | Age: 60
End: 2025-01-06
Payer: MEDICARE

## 2025-01-06 VITALS — HEIGHT: 73 IN | WEIGHT: 216 LBS | RESPIRATION RATE: 18 BRPM | BODY MASS INDEX: 28.63 KG/M2

## 2025-01-06 DIAGNOSIS — L03.115 CELLULITIS OF RIGHT FOOT: Primary | ICD-10-CM

## 2025-01-06 PROCEDURE — 99213 OFFICE O/P EST LOW 20 MIN: CPT | Performed by: PODIATRIST

## 2025-01-06 RX ORDER — SULFAMETHOXAZOLE AND TRIMETHOPRIM 800; 160 MG/1; MG/1
1 TABLET ORAL EVERY 12 HOURS SCHEDULED
Qty: 20 TABLET | Refills: 0 | Status: SHIPPED | OUTPATIENT
Start: 2025-01-06 | End: 2025-01-16

## 2025-01-06 RX ORDER — MUPIROCIN 20 MG/G
OINTMENT TOPICAL 2 TIMES DAILY
Qty: 22 G | Refills: 0 | Status: SHIPPED | OUTPATIENT
Start: 2025-01-06

## 2025-01-06 NOTE — PROGRESS NOTES
Patient presents for assessment of right foot.  Patient was diagnosed with cellulitis at his last visit.  Culture was positive for methicillin resistant Staph aureus with 1+ growth.  He was placed on both Bactroban ointment and doxycycline 100 mg twice daily.  There is improvement but patient still notes discoloration right foot and mild discomfort.    On exam, there is an area of induration present dorsum of right foot.  There is no drainage nor is during open area.  The discoloration is erythematous.    Reviewed culture results with patient.  Recommended continuing with antibiotics.  Switched from doxycycline to Bactrim DS twice daily for 10 days.  Bactroban also prescribed for twice daily application.  He will be rescheduled in 2 weeks.

## 2025-01-06 NOTE — TELEPHONE ENCOUNTER
----- Message from Thao CEE sent at 1/3/2025  9:18 AM EST -----  Regarding: FW: appointment Ok to B  Please schedule per mary  ----- Message -----  From: Mary Morris PA-C  Sent: 12/27/2024   1:44 PM EST  To: Thao Carlisle MA  Subject: appointment Ok to B                              Can you call this patients wife?girlfriend and have him seen at clinic next available for chronic otorrhea x months.  Needs clearance prior to a surgery    Thanks  V

## 2025-01-07 ENCOUNTER — TELEPHONE (OUTPATIENT)
Dept: FAMILY MEDICINE CLINIC | Facility: CLINIC | Age: 60
End: 2025-01-07

## 2025-01-07 NOTE — TELEPHONE ENCOUNTER
When pt calls back we need to know why he needs to see ENT. We can give him dr barahona number unless he already has ENT doctor Marciano # [phone]

## 2025-01-20 ENCOUNTER — OFFICE VISIT (OUTPATIENT)
Dept: PODIATRY | Facility: CLINIC | Age: 60
End: 2025-01-20
Payer: MEDICARE

## 2025-01-20 VITALS — BODY MASS INDEX: 28.36 KG/M2 | RESPIRATION RATE: 18 BRPM | HEIGHT: 73 IN | WEIGHT: 214 LBS

## 2025-01-20 DIAGNOSIS — L03.115 CELLULITIS OF RIGHT FOOT: Primary | ICD-10-CM

## 2025-01-20 PROCEDURE — 99212 OFFICE O/P EST SF 10 MIN: CPT | Performed by: PODIATRIST

## 2025-01-20 NOTE — PROGRESS NOTES
Patient presents for assessment of right foot.  He relates no pain at this time.  Cellulitis has resolved.  There is a continued presence of a small dominic as if there was an insect bite on the dorsum of the right foot but there is no drainage or pain with palpation.  Reappoint as needed

## 2025-01-22 ENCOUNTER — TELEPHONE (OUTPATIENT)
Dept: FAMILY MEDICINE CLINIC | Facility: CLINIC | Age: 60
End: 2025-01-22

## 2025-01-22 DIAGNOSIS — E78.5 DYSLIPIDEMIA: ICD-10-CM

## 2025-01-22 DIAGNOSIS — J44.9 CHRONIC OBSTRUCTIVE PULMONARY DISEASE, UNSPECIFIED COPD TYPE (HCC): ICD-10-CM

## 2025-01-22 DIAGNOSIS — I10 PRIMARY HYPERTENSION: ICD-10-CM

## 2025-01-22 DIAGNOSIS — K21.9 CHRONIC GERD: ICD-10-CM

## 2025-01-22 DIAGNOSIS — M54.2 NECK PAIN: ICD-10-CM

## 2025-01-22 RX ORDER — ATORVASTATIN CALCIUM 20 MG/1
20 TABLET, FILM COATED ORAL DAILY
Qty: 30 TABLET | Refills: 2 | Status: SHIPPED | OUTPATIENT
Start: 2025-01-22

## 2025-01-22 RX ORDER — CELECOXIB 200 MG/1
200 CAPSULE ORAL 2 TIMES DAILY
Qty: 60 CAPSULE | Refills: 2 | Status: SHIPPED | OUTPATIENT
Start: 2025-01-22

## 2025-01-22 RX ORDER — ALBUTEROL SULFATE 90 UG/1
2 INHALANT RESPIRATORY (INHALATION) EVERY 4 HOURS PRN
Qty: 18 G | Refills: 2 | Status: SHIPPED | OUTPATIENT
Start: 2025-01-22

## 2025-01-22 RX ORDER — AMLODIPINE BESYLATE 10 MG/1
10 TABLET ORAL DAILY
Qty: 30 TABLET | Refills: 3 | Status: SHIPPED | OUTPATIENT
Start: 2025-01-22

## 2025-01-22 RX ORDER — OMEPRAZOLE 40 MG/1
40 CAPSULE, DELAYED RELEASE ORAL
Qty: 30 CAPSULE | Refills: 2 | Status: SHIPPED | OUTPATIENT
Start: 2025-01-22 | End: 2025-07-21

## 2025-01-22 NOTE — TELEPHONE ENCOUNTER
Pt called to get refills but he did not know the names of the medications. Informed pt that I would need the names to request the refills. Told pt to call back with the names.

## 2025-01-27 ENCOUNTER — TELEPHONE (OUTPATIENT)
Dept: FAMILY MEDICINE CLINIC | Facility: CLINIC | Age: 60
End: 2025-01-27

## 2025-01-27 DIAGNOSIS — H92.09 EARACHE: Primary | ICD-10-CM

## 2025-01-27 NOTE — TELEPHONE ENCOUNTER
Patient is asking for a doctor referral to see ENT.  He had tubes put in a few years ago, by [facility] specialist.  Does not want to go back there.  Can you give him a referral to see one of [facility]?  His ears feel clogged from these tubes.

## 2025-02-15 ENCOUNTER — HOSPITAL ENCOUNTER (EMERGENCY)
Facility: HOSPITAL | Age: 60
Discharge: HOME | End: 2025-02-15
Attending: EMERGENCY MEDICINE
Payer: MEDICARE

## 2025-02-15 ENCOUNTER — APPOINTMENT (EMERGENCY)
Dept: CT IMAGING | Facility: HOSPITAL | Age: 60
End: 2025-02-15
Payer: MEDICARE

## 2025-02-15 VITALS
SYSTOLIC BLOOD PRESSURE: 187 MMHG | HEART RATE: 76 BPM | WEIGHT: 220 LBS | BODY MASS INDEX: 29.03 KG/M2 | RESPIRATION RATE: 16 BRPM | TEMPERATURE: 97.3 F | DIASTOLIC BLOOD PRESSURE: 106 MMHG | OXYGEN SATURATION: 97 %

## 2025-02-15 DIAGNOSIS — H92.12 EAR DISCHARGE OF LEFT EAR: Primary | ICD-10-CM

## 2025-02-15 PROCEDURE — 99283 EMERGENCY DEPT VISIT LOW MDM: CPT

## 2025-02-15 PROCEDURE — 70480 CT ORBIT/EAR/FOSSA W/O DYE: CPT

## 2025-02-15 PROCEDURE — 99284 EMERGENCY DEPT VISIT MOD MDM: CPT | Performed by: EMERGENCY MEDICINE

## 2025-02-15 RX ORDER — CIPROFLOXACIN AND DEXAMETHASONE 3; 1 MG/ML; MG/ML
4 SUSPENSION/ DROPS AURICULAR (OTIC) 2 TIMES DAILY
Qty: 7.5 ML | Refills: 0 | Status: SHIPPED | OUTPATIENT
Start: 2025-02-15 | End: 2025-03-01

## 2025-02-15 NOTE — ED PROVIDER NOTES
"Time reflects when diagnosis was documented in both MDM as applicable and the Disposition within this note       Time User Action Codes Description Comment    2/15/2025  3:47 PM EzeRajat Add [H92.12] Ear discharge of left ear           ED Disposition       ED Disposition   AMA    Condition   --    Date/Time   Sat Feb 15, 2025  3:48 PM    Comment   Date: 2/15/2025  Patient: Daniel Wagner  Admitted: 2/15/2025 11:40 AM  Attending Provider: Venice Cabrera MD    Daniel Wagner or his authorized caregiver has made the decision for the patient to leave the emergency department against the advice of h is attending physician. He or his authorized caregiver has been informed and understands the inherent risks, including death, worsening of symptoms, delayed treatment or lack of treatment.  He or his authorized caregiver has decided to accept the res ponsibility for this decision. Daniel Wagner and all necessary parties have been advised that he may return for further evaluation or treatment. His condition at time of discharge was stable.  Daniel Wagner had current vital signs as follows:  BP (!)  187/106 (BP Location: Right arm)   Pulse 76   Temp (!) 97.3 °F (36.3 °C) (Oral)   Resp 16   Wt 99.8 kg (220 lb)                Assessment & Plan       Medical Decision Making  Patient seen and examined.  There is a tube in the left TM, TM is opaque.  There is seropurulent drainage in the ear canal.  Right TM and ear canal are normal.  There is no mastoid tenderness or erythema.  Appropriate imaging ordered.    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 No osseous erosion to suggest underlying cholesteatoma.\" And recommends follow up with MRI and ENT. Results discussed with patient, he expresses understanding.  Case discussed with ENT Dr. Hickman.  While waiting for reply from ENT patient decided he needed " to leave because his fevers are getting worse with snow wants to be safe driving home.  Patient says he would like to get the recommendations from ENT at home.  I discussed with the patient the risks of leaving before ENT replies including delayed care, worsening of his condition, and pain.  The patient states acceptance of these risks and chose to leave AMA.    ENT recommends 14 days of Ciprodex, prescription ordered for outpatient.  Patient called and updated on recommendations from ENT, he is agreeable to this plan.  All questions answered.    Amount and/or Complexity of Data Reviewed  Radiology: ordered.    Risk  Prescription drug management.             Medications - No data to display    ED Risk Strat Scores                                                History of Present Illness       Chief Complaint   Patient presents with    Earache     Tubes placed in the ear 4 years ago. Pain and drainage from left ear. No fevers or chills.        Past Medical History:   Diagnosis Date    Anxiety     Asthma     Peterson esophagus     Peterson's esophagus     Bowel obstruction (HCC)     Cervical radiculopathy     Chest pain     Last assessed - 7/31/17    Chronic GERD     Last assessed - 3/11/16    COPD (chronic obstructive pulmonary disease) (HCC)     Hep C w/o coma, chronic (HCC)     treated    Hepatic disease     Cirrhosis    Hernia     History of neck problems     Hypercholesterolemia     Hypertension     Kidney disease     Kidney stones     Paresthesia of upper extremity     SOB (shortness of breath) on exertion     Wears glasses       Past Surgical History:   Procedure Laterality Date    APPENDECTOMY      ESOPHAGOGASTRODUODENOSCOPY      HERNIA REPAIR      Resolved     [place] ESOPHAGOGASTRODUODENOSCOPY TRANSORAL DIAGNOSTIC N/A 11/30/2017    Procedure: ESOPHAGOGASTRODUODENOSCOPY (EGD);  Surgeon: Jazmyne Lynn DO;  Location: [facility];  Service: Gastroenterology    TONSILLECTOMY      WISDOM TOOTH EXTRACTION        Family  History   Problem Relation Age of Onset    Hyperlipidemia Mother     Cancer Mother         Malignant Neoplasm     Heart attack Father     Lung cancer Maternal Grandmother     Lung cancer Maternal Grandfather     Heart attack Maternal Grandfather     No Known Problems Paternal Grandmother     No Known Problems Paternal Grandfather     Other Other         Cardiac Disorder    Stroke Other       Social History     Tobacco Use    Smoking status: Every Day     Current packs/day: 0.50     Average packs/day: 0.5 packs/day for 48.1 years (24.1 ttl pk-yrs)     Types: Cigarettes     Start date: 1977    Smokeless tobacco: Current    Tobacco comments:     since 12 yrs old, Smokes less than 1 pack per day  per [employer]    Vaping Use    Vaping status: Never Used   Substance Use Topics    Alcohol use: Not Currently    Drug use: Yes     Types: Methamphetamines     Comment: last use 5 days ago      E-Cigarette/Vaping    E-Cigarette Use Never User       E-Cigarette/Vaping Substances    Nicotine No     THC No     CBD No     Flavoring No     Other No     Unknown No       I have reviewed and agree with the history as documented.     60-year-old man presenting with left ear drainage x 1 year.  He states he got tubes put in 4 years ago by ENT and has had intermittent drainage since that time, it has been constant for the last 1 year.  He tried to get in with ENT and call them at the end of December.  They recommended he go to the ER for CT of the facial bones to rule out cholesteatoma.  He presents today because his wife was finally able to convince him to come.  He notes he has had shakes and chills in the evening for the last 3 days, this resolves when he goes to bed.  He has not checked his temperature.  He does not have chills throughout the day.  He has COPD.  He endorses occasional shortness of breath alleviated by his COPD rescue at occasions.  He denies chest pain, headaches, lightheadedness, dizziness, nausea, vomiting,  diarrhea.      History provided by:  Patient  Earache  Associated symptoms: ear discharge    Associated symptoms: no abdominal pain, no congestion, no cough, no diarrhea, no fever, no headaches, no rash, no rhinorrhea, no sore throat and no vomiting        Review of Systems   Constitutional:  Negative for chills, fatigue and fever.   HENT:  Positive for ear discharge. Negative for congestion, ear pain, postnasal drip, rhinorrhea, sinus pain and sore throat.    Eyes:  Negative for pain and visual disturbance.   Respiratory:  Negative for cough, chest tightness and shortness of breath.    Cardiovascular:  Negative for chest pain and palpitations.   Gastrointestinal:  Negative for abdominal pain, constipation, diarrhea, nausea and vomiting.   Genitourinary:  Negative for difficulty urinating, dysuria and hematuria.   Musculoskeletal:  Negative for back pain.   Skin:  Negative for color change and rash.   Neurological:  Negative for dizziness, seizures, syncope, weakness, light-headedness, numbness and headaches.   All other systems reviewed and are negative.          Objective       ED Triage Vitals [02/15/25 1125]   Temperature Pulse Blood Pressure Respirations SpO2 Patient Position - Orthostatic VS   (!) 97.3 °F (36.3 °C) 76 (!) 187/106 16 97 % --      Temp Source Heart Rate Source BP Location FiO2 (%) Pain Score    Oral Monitor Right arm -- --      Vitals      Date and Time Temp Pulse SpO2 Resp BP Pain Score FACES Pain Rating User   02/15/25 1125 97.3 °F (36.3 °C) 76 97 % 16 187/106 -- -- LD            Physical Exam  Constitutional:       General: He is not in acute distress.     Appearance: He is not diaphoretic.   HENT:      Head: Normocephalic and atraumatic.      Right Ear: Tympanic membrane normal.      Left Ear: Drainage present. A middle ear effusion is present. No mastoid tenderness. A PE tube is present. No hemotympanum. Tympanic membrane is scarred. Tympanic membrane is not injected, erythematous,  retracted or bulging.      Nose: No congestion or rhinorrhea.      Mouth/Throat:      Mouth: Mucous membranes are moist.      Pharynx: No oropharyngeal exudate.   Eyes:      General: No scleral icterus.  Cardiovascular:      Rate and Rhythm: Normal rate and regular rhythm.      Heart sounds: Normal heart sounds. No murmur heard.     No friction rub. No gallop.   Pulmonary:      Effort: No respiratory distress.      Breath sounds: Normal breath sounds. No wheezing, rhonchi or rales.   Abdominal:      General: Abdomen is flat. There is no distension.      Palpations: Abdomen is soft.      Tenderness: There is no abdominal tenderness.   Lymphadenopathy:      Cervical: No cervical adenopathy.   Skin:     General: Skin is warm and dry.      Capillary Refill: Capillary refill takes less than 2 seconds.   Neurological:      General: No focal deficit present.      Mental Status: He is alert and oriented to person, place, and time.         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 No osseous erosion to suggest underlying cholesteatoma. MRI brain IAC protocol with and without    contrast to include non-epi planar DWI sequence (i.e. Haste or Propeller DWI) would be helpful to exclude cholesteatoma. Recommend follow-up with ENT.      Additional chronic/incidental findings as detailed above.      The study was marked in EPIC for immediate notification.            Workstation performed: [id number]             Procedures    ED Medication and Procedure Management   Prior to Admission Medications   Prescriptions Last Dose Informant Patient Reported? Taking?   albuterol (Ventolin HFA) 90 mcg/act inhaler   No No   Sig: Inhale 2 puffs every 4 (four) hours as needed  for wheezing or shortness of breath   amLODIPine (NORVASC) 10 mg tablet   No No   Sig: Take 1 tablet (10 mg total) by mouth daily   atorvastatin (LIPITOR) 20 mg tablet   No No   Sig: Take 1 tablet (20 mg total) by mouth daily   budesonide-formoterol (SYMBICORT) 160-4.5 mcg/act inhaler  Self No No   Sig: Inhale 2 puffs 2 (two) times a day Rinse mouth after use.   celecoxib (CeleBREX) 200 mg capsule   No No   Sig: Take 1 capsule (200 mg total) by mouth 2 (two) times a day   mupirocin (BACTROBAN) 2 % ointment  Self No No   Sig: Apply topically 2 (two) times a day   mupirocin (BACTROBAN) 2 % ointment   No No   Sig: Apply topically 2 (two) times a day   omeprazole (PriLOSEC) 40 MG capsule   No No   Sig: Take 1 capsule (40 mg total) by mouth daily before breakfast   tamsulosin (FLOMAX) 0.4 mg  Self No No   Sig: Take 1 capsule (0.4 mg total) by mouth daily with dinner      Facility-Administered Medications: None     Discharge Medication List as of 2/15/2025  3:48 PM        CONTINUE these medications which have NOT CHANGED    Details   albuterol (Ventolin HFA) 90 mcg/act inhaler Inhale 2 puffs every 4 (four) hours as needed for wheezing or shortness of breath, Starting Wed 1/22/2025, Normal      amLODIPine (NORVASC) 10 mg tablet Take 1 tablet (10 mg total) by mouth daily, Starting Wed 1/22/2025, Normal      atorvastatin (LIPITOR) 20 mg tablet Take 1 tablet (20 mg total) by mouth daily, Starting Wed 1/22/2025, Normal      budesonide-formoterol (SYMBICORT) 160-4.5 mcg/act inhaler Inhale 2 puffs 2 (two) times a day Rinse mouth after use., Starting Thu 10/10/2024, Normal      celecoxib (CeleBREX) 200 mg capsule Take 1 capsule (200 mg total) by mouth 2 (two) times a day, Starting Wed 1/22/2025, Normal      !! mupirocin (BACTROBAN) 2 % ointment Apply topically 2 (two) times a day, Starting Mon 12/23/2024, Normal      !! mupirocin (BACTROBAN) 2 % ointment Apply topically 2 (two) times a day, Starting Mon 1/6/2025, Normal       omeprazole (PriLOSEC) 40 MG capsule Take 1 capsule (40 mg total) by mouth daily before breakfast, Starting Wed 1/22/2025, Until Mon 7/21/2025, Normal      tamsulosin (FLOMAX) 0.4 mg Take 1 capsule (0.4 mg total) by mouth daily with dinner, Starting Thu 12/12/2024, Normal       !! - Potential duplicate medications found. Please discuss with provider.          ED SEPSIS DOCUMENTATION   Time reflects when diagnosis was documented in both MDM as applicable and the Disposition within this note       Time User Action Codes Description Comment    2/15/2025  3:47 PM Rajat Loo Add [H92.12] Ear discharge of left ear                  Rajat Loo MD  02/15/25 2600

## 2025-02-16 NOTE — ED ATTENDING ATTESTATION
2/15/2025  I, Venice Cabrera MD, saw and evaluated the patient. I have discussed the patient with the resident/non-physician practitioner and agree with the resident's/non-physician practitioner's findings, Plan of Care, and MDM as documented in the resident's/non-physician practitioner's note, except where noted. All available labs and Radiology studies were reviewed.  I was present for key portions of any procedure(s) performed by the resident/non-physician practitioner and I was immediately available to provide assistance.       At this point I agree with the current assessment done in the Emergency Department.  I have conducted an independent evaluation of this patient a history and physical is as follows:    60-year-old male with chronic drainage from the left ear presenting to the ER for CAT scan recommended by ENT.  Has been unable to see ENT.  No fevers.  No pain.    Agree with CT.      ED Course         Critical Care Time  Procedures

## 2025-02-18 ENCOUNTER — TELEPHONE (OUTPATIENT)
Age: 60
End: 2025-02-18

## 2025-02-25 ENCOUNTER — HOSPITAL ENCOUNTER (EMERGENCY)
Facility: HOSPITAL | Age: 60
Discharge: HOME | End: 2025-02-25
Attending: EMERGENCY MEDICINE
Payer: MEDICARE

## 2025-02-25 VITALS
DIASTOLIC BLOOD PRESSURE: 74 MMHG | OXYGEN SATURATION: 95 % | TEMPERATURE: 97.6 F | HEART RATE: 79 BPM | SYSTOLIC BLOOD PRESSURE: 147 MMHG | RESPIRATION RATE: 16 BRPM

## 2025-02-25 DIAGNOSIS — I10 HYPERTENSION: Primary | ICD-10-CM

## 2025-02-25 DIAGNOSIS — H66.90 OTITIS MEDIA: ICD-10-CM

## 2025-02-25 PROCEDURE — 93005 ELECTROCARDIOGRAM TRACING: CPT

## 2025-02-25 PROCEDURE — 99284 EMERGENCY DEPT VISIT MOD MDM: CPT

## 2025-02-25 PROCEDURE — 99284 EMERGENCY DEPT VISIT MOD MDM: CPT | Performed by: EMERGENCY MEDICINE

## 2025-02-25 NOTE — ED PROVIDER NOTES
Time reflects when diagnosis was documented in both MDM as applicable and the Disposition within this note       Time User Action Codes Description Comment    2/25/2025  2:21 PM Darwin, Shahzad SULLIVAN Add [I10] Hypertension     2/25/2025  2:28 PM Darwin, Shahzad SULLIVAN Add [H66.90] Otitis media           ED Disposition       ED Disposition   Discharge    Condition   Stable    Date/Time   Tue Feb 25, 2025  2:28 PM    Comment   Daniel Bernard discharge to home/self care.                   Assessment & Plan       Medical Decision Making  60-year-old male with history of hypertension coming in due to elevated blood pressures at home.  Patient states he has a history of of hypertension on amlodipine but states has been dealing with a lot of stress recently with a friend in ICU and has been missing dose of his home blood pressure medications.  Home blood pressures have been around 170-190 systolic for which she came to the emergency department.  Patient also states that he has been dealing with a chronic left ear infection which she has tympanostomy tubes in place with ear drainage.  Otherwise denies any systemic symptoms of infection.  Patient has been feeling more tired and mildly lightheaded but this has been going on for weeks.  Patient also states that he feels like his vision has been getting worse but this has been getting worse over the last year and has not changed acutely.    On physical exam, patient is in no acute distress, blood pressure urgently elevated around 170 systolic but after evaluation recheck blood pressure was 147/74.  Left ear does have purulent drainage from tympanostomy tube.  Otherwise neurologic exam is unremarkable.  Visual acuity is 20/60 bilaterally.  Current clinical impression is asymptomatic hypertension which patient should strive to take his home medication appropriately and follow-up with his family doctor.  Patient also has a previously scheduled appointment with ENT next week, will give oral  antibiotics for treatment of otitis media and have patient follow-up.    Risk  Prescription drug management.        ED Course as of 02/26/25 1534   Tue Feb 25, 2025   1404 Htn 170-190 yesterday, missing doses of amlodipine recently, tiredness for 2 weeks, lightheaded. Friend in ICU very sick, symptoms started around then.    Recently treated for ear infection but out of drops and thinks he still has an infection. ENT in ~1 weeks, no fevers.       1409 Acuity 20/60 B/L   1419 Blood Pressure: 147/74   1419 Temperature: 97.6 °F (36.4 °C)   1419 Pulse: 79   1419 Respirations: 16   1419 SpO2: 95 %       Medications - No data to display    ED Risk Strat Scores                                                History of Present Illness       Chief Complaint   Patient presents with    Hypertension     Pt reports dizziness and headache and known htn       Past Medical History:   Diagnosis Date    Anxiety     Asthma     Peterson esophagus     Peterson's esophagus     Bowel obstruction (HCC)     Cervical radiculopathy     Chest pain     Last assessed - 7/31/17    Chronic GERD     Last assessed - 3/11/16    COPD (chronic obstructive pulmonary disease) (HCC)     Hep C w/o coma, chronic (HCC)     treated    Hepatic disease     Cirrhosis    Hernia     History of neck problems     Hypercholesterolemia     Hypertension     Kidney disease     Kidney stones     Paresthesia of upper extremity     SOB (shortness of breath) on exertion     Wears glasses       Past Surgical History:   Procedure Laterality Date    APPENDECTOMY      ESOPHAGOGASTRODUODENOSCOPY      HERNIA REPAIR      Resolved     FL ESOPHAGOGASTRODUODENOSCOPY TRANSORAL DIAGNOSTIC N/A 11/30/2017    Procedure: ESOPHAGOGASTRODUODENOSCOPY (EGD);  Surgeon: Jazmyne Lynn DO;  Location: [facility];  Service: Gastroenterology    TONSILLECTOMY      WISDOM TOOTH EXTRACTION        Family History   Problem Relation Age of Onset    Hyperlipidemia Mother     Cancer Mother         Malignant  Neoplasm     Heart attack Father     Lung cancer Maternal Grandmother     Lung cancer Maternal Grandfather     Heart attack Maternal Grandfather     No Known Problems Paternal Grandmother     No Known Problems Paternal Grandfather     Other Other         Cardiac Disorder    Stroke Other       Social History     Tobacco Use    Smoking status: Every Day     Current packs/day: 0.50     Average packs/day: 0.5 packs/day for 48.2 years (24.1 ttl pk-yrs)     Types: Cigarettes     Start date: 1977    Smokeless tobacco: Current    Tobacco comments:     since 12 yrs old, Smokes less than 1 pack per day  per [employer]    Vaping Use    Vaping status: Never Used   Substance Use Topics    Alcohol use: Not Currently    Drug use: Yes     Types: Methamphetamines     Comment: last use 5 days ago      E-Cigarette/Vaping    E-Cigarette Use Never User       E-Cigarette/Vaping Substances    Nicotine No     THC No     CBD No     Flavoring No     Other No     Unknown No       I have reviewed and agree with the history as documented.     60-year-old male with history of hypertension coming in due to elevated blood pressures at home.  Patient states he has a history of of hypertension on amlodipine but states has been dealing with a lot of stress recently with a friend in ICU and has been missing dose of his home blood pressure medications.  Home blood pressures have been around 170-190 systolic for which she came to the emergency department.  Patient also states that he has been dealing with a chronic left ear infection which she has tympanostomy tubes in place with ear drainage.  Otherwise denies any systemic symptoms of infection.  Patient has been feeling more tired and mildly lightheaded but this has been going on for weeks.  Patient also states that he feels like his vision has been getting worse but this has been getting worse over the last year and has not changed acutely.        Review of Systems   Constitutional:  Positive for  fatigue. Negative for chills and fever.   HENT:  Positive for ear discharge. Negative for sore throat.    Eyes:  Negative for pain.   Respiratory:  Negative for cough and shortness of breath.    Cardiovascular:  Negative for chest pain and palpitations.   Gastrointestinal:  Negative for abdominal pain, nausea and vomiting.   Genitourinary:  Negative for dysuria and hematuria.   Musculoskeletal:  Negative for arthralgias and back pain.   Skin:  Negative for color change and rash.   Neurological:  Positive for light-headedness. Negative for seizures and syncope.   All other systems reviewed and are negative.          Objective       ED Triage Vitals   Temperature Pulse Blood Pressure Respirations SpO2 Patient Position - Orthostatic VS   02/25/25 1358 02/25/25 1357 02/25/25 1358 02/25/25 1357 02/25/25 1357 --   97.6 °F (36.4 °C) 83 168/88 16 97 %       Temp src Heart Rate Source BP Location FiO2 (%) Pain Score    -- 02/25/25 1357 -- -- 02/25/25 1357     Monitor   4      Vitals      Date and Time Temp Pulse SpO2 Resp BP Pain Score FACES Pain Rating User   02/25/25 1416 -- 79 95 % -- 147/74 -- -- MRR   02/25/25 1358 97.6 °F (36.4 °C) -- -- -- 168/88 -- -- AB   02/25/25 1357 -- 83 97 % 16 -- 4 -- AB            Physical Exam  Vitals and nursing note reviewed.   Constitutional:       General: He is not in acute distress.     Appearance: He is well-developed.   HENT:      Head: Normocephalic and atraumatic.      Right Ear: Tympanic membrane, ear canal and external ear normal.      Left Ear: Ear canal and external ear normal.      Ears:      Comments: Left tympanostomy tube with purulent drainage.  No redness or swelling of ear canal, external ear or mastoid     Nose: Nose normal. No congestion or rhinorrhea.      Mouth/Throat:      Mouth: Mucous membranes are moist.      Pharynx: Oropharynx is clear.   Eyes:      Extraocular Movements: Extraocular movements intact.      Conjunctiva/sclera: Conjunctivae normal.      Pupils:  Pupils are equal, round, and reactive to light.   Cardiovascular:      Rate and Rhythm: Normal rate and regular rhythm.      Pulses: Normal pulses.      Heart sounds: Normal heart sounds. No murmur heard.  Pulmonary:      Effort: Pulmonary effort is normal. No respiratory distress.      Breath sounds: Normal breath sounds. No wheezing, rhonchi or rales.   Chest:      Chest wall: No tenderness.   Abdominal:      General: Abdomen is flat. Bowel sounds are normal. There is no distension.      Palpations: Abdomen is soft.      Tenderness: There is no abdominal tenderness. There is no right CVA tenderness or left CVA tenderness.   Musculoskeletal:         General: No deformity or signs of injury. Normal range of motion.      Cervical back: Normal range of motion and neck supple. No rigidity or tenderness.   Skin:     General: Skin is warm and dry.      Findings: No bruising, lesion or rash.   Neurological:      General: No focal deficit present.      Mental Status: He is alert and oriented to person, place, and time.      Cranial Nerves: No cranial nerve deficit.      Sensory: No sensory deficit.         Results Reviewed       None            No orders to display       ECG 12 Lead Documentation Only    Date/Time: 2/25/2025 2:20 PM    Performed by: Shahzad Granados MD  Authorized by: Shahzad Granados MD    Patient location:  ED  Previous ECG:     Previous ECG:  Compared to current    Similarity:  No change  Interpretation:     Interpretation: non-specific    Rate:     ECG rate:  84    ECG rate assessment: normal    Rhythm:     Rhythm: sinus rhythm    Ectopy:     Ectopy: none    QRS:     QRS axis:  Normal    QRS intervals:  Normal  Conduction:     Conduction: normal    ST segments:     ST segments:  Abnormal    Elevation:  III  T waves:     T waves: normal        ED Medication and Procedure Management   Prior to Admission Medications   Prescriptions Last Dose Informant Patient Reported? Taking?   albuterol (Ventolin HFA) 90  mcg/act inhaler   No No   Sig: Inhale 2 puffs every 4 (four) hours as needed for wheezing or shortness of breath   amLODIPine (NORVASC) 10 mg tablet   No No   Sig: Take 1 tablet (10 mg total) by mouth daily   atorvastatin (LIPITOR) 20 mg tablet   No No   Sig: Take 1 tablet (20 mg total) by mouth daily   budesonide-formoterol (SYMBICORT) 160-4.5 mcg/act inhaler  Self No No   Sig: Inhale 2 puffs 2 (two) times a day Rinse mouth after use.   celecoxib (CeleBREX) 200 mg capsule   No No   Sig: Take 1 capsule (200 mg total) by mouth 2 (two) times a day   ciprofloxacin-dexamethasone (CIPRODEX) otic suspension   No No   Sig: Administer 4 drops into the left ear 2 (two) times a day for 14 days   mupirocin (BACTROBAN) 2 % ointment  Self No No   Sig: Apply topically 2 (two) times a day   mupirocin (BACTROBAN) 2 % ointment   No No   Sig: Apply topically 2 (two) times a day   omeprazole (PriLOSEC) 40 MG capsule   No No   Sig: Take 1 capsule (40 mg total) by mouth daily before breakfast   tamsulosin (FLOMAX) 0.4 mg  Self No No   Sig: Take 1 capsule (0.4 mg total) by mouth daily with dinner      Facility-Administered Medications: None     Discharge Medication List as of 2/25/2025  2:30 PM        START taking these medications    Details   amoxicillin-clavulanate (AUGMENTIN) 875-125 mg per tablet Take 1 tablet by mouth every 12 (twelve) hours for 7 days, Starting Tue 2/25/2025, Until Tue 3/4/2025, Normal           CONTINUE these medications which have NOT CHANGED    Details   albuterol (Ventolin HFA) 90 mcg/act inhaler Inhale 2 puffs every 4 (four) hours as needed for wheezing or shortness of breath, Starting Wed 1/22/2025, Normal      amLODIPine (NORVASC) 10 mg tablet Take 1 tablet (10 mg total) by mouth daily, Starting Wed 1/22/2025, Normal      atorvastatin (LIPITOR) 20 mg tablet Take 1 tablet (20 mg total) by mouth daily, Starting Wed 1/22/2025, Normal      budesonide-formoterol (SYMBICORT) 160-4.5 mcg/act inhaler Inhale 2 puffs  2 (two) times a day Rinse mouth after use., Starting Thu 10/10/2024, Normal      celecoxib (CeleBREX) 200 mg capsule Take 1 capsule (200 mg total) by mouth 2 (two) times a day, Starting Wed 1/22/2025, Normal      ciprofloxacin-dexamethasone (CIPRODEX) otic suspension Administer 4 drops into the left ear 2 (two) times a day for 14 days, Starting Sat 2/15/2025, Until Sat 3/1/2025, Normal      !! mupirocin (BACTROBAN) 2 % ointment Apply topically 2 (two) times a day, Starting Mon 12/23/2024, Normal      !! mupirocin (BACTROBAN) 2 % ointment Apply topically 2 (two) times a day, Starting Mon 1/6/2025, Normal      omeprazole (PriLOSEC) 40 MG capsule Take 1 capsule (40 mg total) by mouth daily before breakfast, Starting Wed 1/22/2025, Until Mon 7/21/2025, Normal      tamsulosin (FLOMAX) 0.4 mg Take 1 capsule (0.4 mg total) by mouth daily with dinner, Starting Thu 12/12/2024, Normal       !! - Potential duplicate medications found. Please discuss with provider.        No discharge procedures on file.  ED SEPSIS DOCUMENTATION   Time reflects when diagnosis was documented in both MDM as applicable and the Disposition within this note       Time User Action Codes Description Comment    2/25/2025  2:21 PM Shahzad Granados [I10] Hypertension     2/25/2025  2:28 PM Shahzad Granados [H66.90] Otitis media                  Shahzad Granados MD  02/26/25 1535

## 2025-02-25 NOTE — ED ATTENDING ATTESTATION
2/25/2025  IAmy MD, saw and evaluated the patient. I have discussed the patient with the resident/non-physician practitioner and agree with the resident's/non-physician practitioner's findings, Plan of Care, and MDM as documented in the resident's/non-physician practitioner's note, except where noted. All available labs and Radiology studies were reviewed.  I was present for key portions of any procedure(s) performed by the resident/non-physician practitioner and I was immediately available to provide assistance.       At this point I agree with the current assessment done in the Emergency Department.  I have conducted an independent evaluation of this patient a history and physical is as follows:    This is a 60-year-old male patient presenting to the ED today for complaint of elevated blood pressure.  Patient was visiting his friend in the ICU upstairs, and show to feel like his blood pressure was elevating.  States her blood pressure was up into the 180s.  He also felt dizzy, and had some diaphoresis, nursing in the ICU spoke to him, and advised him to come to the ED for further evaluation.  Upon arrival to the ED his blood pressure is improved, he is no longer diaphoretic, and does not have any dizziness.  He denies any chest pain, shortness of breath, nausea or vomiting abdominal pain, diarrhea, constipation, focal weakness, numbness or tingling, or any other significantly associated symptoms.  Upon arrival to the ED his blood pressure is now normal, he does not have any symptoms anymore.  Patient was offered evaluation, but agreed only to the EKG and would like to go home.  Encouraged that we can evaluate him, if he changes his mind, to come back to the ED if his symptoms return or worsen.  His differential diagnosis includes: Arrhythmia versus electrolyte abnormality versus stress response versus vasovagal versus other.    ED Course         Critical Care Time  Procedures

## 2025-02-25 NOTE — DISCHARGE INSTRUCTIONS
Please ensure you are taking medications for management of your high blood pressure.    Please take antibiotics as prescribed and follow-up with ENT on your previously scheduled appointment on 30/6/25.    Please follow-up with your family doctor for send and management of your Hypertension.

## 2025-02-26 ENCOUNTER — VBI (OUTPATIENT)
Dept: FAMILY MEDICINE CLINIC | Facility: CLINIC | Age: 60
End: 2025-02-26

## 2025-02-26 NOTE — TELEPHONE ENCOUNTER
02/26/25 9:56 AM    Patient contacted post ED visit, [facility] department spoke with patient/caregiver and outreach was successful.    Thank you.  Viridiana Mehta MA  [facility]

## 2025-03-02 LAB
ATRIAL RATE: 84 BPM
P AXIS: 36 DEGREES
PR INTERVAL: 120 MS
QRS AXIS: 82 DEGREES
QRSD INTERVAL: 84 MS
QT INTERVAL: 368 MS
QTC INTERVAL: 434 MS
T WAVE AXIS: 76 DEGREES
VENTRICULAR RATE: 84 BPM

## 2025-03-02 PROCEDURE — 93010 ELECTROCARDIOGRAM REPORT: CPT | Performed by: INTERNAL MEDICINE

## 2025-03-05 ENCOUNTER — OFFICE VISIT (OUTPATIENT)
Dept: FAMILY MEDICINE CLINIC | Facility: CLINIC | Age: 60
End: 2025-03-05
Payer: MEDICARE

## 2025-03-05 VITALS
DIASTOLIC BLOOD PRESSURE: 82 MMHG | WEIGHT: 217 LBS | RESPIRATION RATE: 16 BRPM | HEART RATE: 64 BPM | SYSTOLIC BLOOD PRESSURE: 160 MMHG | BODY MASS INDEX: 28.76 KG/M2 | HEIGHT: 73 IN | OXYGEN SATURATION: 98 %

## 2025-03-05 DIAGNOSIS — H92.12 EAR DISCHARGE OF LEFT EAR: ICD-10-CM

## 2025-03-05 DIAGNOSIS — J43.9 PULMONARY EMPHYSEMA, UNSPECIFIED EMPHYSEMA TYPE (HCC): ICD-10-CM

## 2025-03-05 DIAGNOSIS — Z12.5 PROSTATE CANCER SCREENING: ICD-10-CM

## 2025-03-05 DIAGNOSIS — I10 PRIMARY HYPERTENSION: Primary | ICD-10-CM

## 2025-03-05 DIAGNOSIS — K21.9 CHRONIC GERD: ICD-10-CM

## 2025-03-05 DIAGNOSIS — R73.01 IFG (IMPAIRED FASTING GLUCOSE): ICD-10-CM

## 2025-03-05 DIAGNOSIS — E78.5 DYSLIPIDEMIA: ICD-10-CM

## 2025-03-05 PROCEDURE — 99214 OFFICE O/P EST MOD 30 MIN: CPT | Performed by: FAMILY MEDICINE

## 2025-03-05 RX ORDER — LOSARTAN POTASSIUM 50 MG/1
50 TABLET ORAL DAILY
Qty: 30 TABLET | Refills: 3 | Status: SHIPPED | OUTPATIENT
Start: 2025-03-05

## 2025-03-05 NOTE — PROGRESS NOTES
Name: Daniel Wagner      : 1965      MRN: [id number]  Encounter Provider: Richard Diaz MD  Encounter Date: 3/5/2025   Encounter department: [facility]  :  Assessment & Plan  Primary hypertension  Patient seen in ER on  for elevated blood pressure. Blood pressure still high. Will add losartan 50 mg daily. Continue amlodipine 10 mg daily. Pt advised to follow a low Na diet and to exercise on a regular basis. Will recheck in 3 weeks.     Orders:  •  losartan (COZAAR) 50 mg tablet; Take 1 tablet (50 mg total) by mouth daily  •  Basic metabolic panel; Future    Ear discharge of left ear  Patient has had discharge for past 1 year but got worse recently. Patient was seen in ER on 2/15 and CT showed left ear and mastoid effusion. Patient was given oral antibiotics and sees ENT tomorrow.          Dyslipidemia  Continue atorvastatin 20 mg daily at bedtime. Advised pt to follow a low cholesterol diet and to exercise on a regular basis.       Pulmonary emphysema, unspecified emphysema type (HCC)  Breathing ok. Continue Symbicort 160-4.5 2 puffs twice a day and rescue inhaler as needed.          Chronic GERD  Stable. Continue omeprazole 40 mg daily and GERD diet. Last EGD was in 2017.        Prostate cancer screening         IFG (impaired fasting glucose)  Will check A1C  Orders:  •  Hemoglobin A1C; Future           History of Present Illness   Patient here for follow-up Hypertension, Hyperlipidemia, Ear discharge, COPD, GERD. Patient doing ok. No chest pain. Gets shortness of breath. Has headaches. Blood pressure has been higher recently and was seen in ER on . Blood pressure still high. Takes amlodipine 10 mg daily. Has appointment with ENT tomorrow for ear discharge.     Hypertension  Associated symptoms include neck pain and shortness of breath. Pertinent negatives include no chest pain or headaches.     Review of Systems   Constitutional:  Negative for fatigue and unexpected  "weight change.   Respiratory:  Positive for shortness of breath. Negative for cough.    Cardiovascular:  Negative for chest pain.   Gastrointestinal:  Negative for abdominal pain, constipation, diarrhea and vomiting.   Musculoskeletal:  Positive for back pain and neck pain. Negative for arthralgias.   Neurological:  Negative for dizziness and headaches.   Psychiatric/Behavioral:  Negative for dysphoric mood. The patient is not nervous/anxious.        Objective   /82 (BP Location: Left arm, Patient Position: Sitting, Cuff Size: Large)   Pulse 64   Resp 16   Ht 6' 1\" (1.854 m)   Wt 98.4 kg (217 lb)   SpO2 98%   BMI 28.63 kg/m²      Physical Exam  Vitals and nursing note reviewed.   Constitutional:       Appearance: Normal appearance. He is normal weight.   Neck:      Vascular: No carotid bruit.   Cardiovascular:      Rate and Rhythm: Normal rate and regular rhythm.      Heart sounds: Normal heart sounds. No murmur heard.  Pulmonary:      Effort: Pulmonary effort is normal.      Breath sounds: Normal breath sounds. No wheezing.   Musculoskeletal:      Cervical back: Normal range of motion and neck supple. No muscular tenderness.      Right lower leg: No edema.      Left lower leg: No edema.   Lymphadenopathy:      Cervical: No cervical adenopathy.   Neurological:      Mental Status: He is alert.   Psychiatric:         Mood and Affect: Mood normal.         Behavior: Behavior normal.         Thought Content: Thought content normal.         Judgment: Judgment normal.         "

## 2025-03-05 NOTE — ASSESSMENT & PLAN NOTE
Patient seen in ER on 2/25 for elevated blood pressure. Blood pressure still high. Will add losartan 50 mg daily. Continue amlodipine 10 mg daily. Pt advised to follow a low Na diet and to exercise on a regular basis. Will recheck in 3 weeks.     Orders:  •  losartan (COZAAR) 50 mg tablet; Take 1 tablet (50 mg total) by mouth daily  •  Basic metabolic panel; Future

## 2025-03-05 NOTE — ASSESSMENT & PLAN NOTE
Continue atorvastatin 20 mg daily at bedtime. Advised pt to follow a low cholesterol diet and to exercise on a regular basis.

## 2025-03-05 NOTE — ASSESSMENT & PLAN NOTE
Patient has had discharge for past 1 year but got worse recently. Patient was seen in ER on 2/15 and CT showed left ear and mastoid effusion. Patient was given oral antibiotics and sees ENT tomorrow.

## 2025-03-06 ENCOUNTER — APPOINTMENT (OUTPATIENT)
Dept: LAB | Facility: HOSPITAL | Age: 60
End: 2025-03-06
Payer: MEDICARE

## 2025-03-06 ENCOUNTER — TELEPHONE (OUTPATIENT)
Age: 60
End: 2025-03-06

## 2025-03-06 ENCOUNTER — OFFICE VISIT (OUTPATIENT)
Dept: OTOLARYNGOLOGY | Facility: CLINIC | Age: 60
End: 2025-03-06
Payer: MEDICARE

## 2025-03-06 DIAGNOSIS — J34.89 NASAL SEPTAL PERFORATION: ICD-10-CM

## 2025-03-06 DIAGNOSIS — J34.2 DEVIATED NASAL SEPTUM: ICD-10-CM

## 2025-03-06 DIAGNOSIS — H71.12 GRANULOMA OF TYMPANIC MEMBRANE OF LEFT EAR: ICD-10-CM

## 2025-03-06 DIAGNOSIS — Z96.22 RETAINED MYRINGOTOMY TUBE IN LEFT EAR: ICD-10-CM

## 2025-03-06 DIAGNOSIS — H92.12: ICD-10-CM

## 2025-03-06 DIAGNOSIS — H92.12 EAR DISCHARGE OF LEFT EAR: ICD-10-CM

## 2025-03-06 DIAGNOSIS — H70.12 CHRONIC MASTOIDITIS OF LEFT SIDE: Primary | ICD-10-CM

## 2025-03-06 DIAGNOSIS — H92.12: Primary | ICD-10-CM

## 2025-03-06 PROCEDURE — 99205 OFFICE O/P NEW HI 60 MIN: CPT | Performed by: OTOLARYNGOLOGY

## 2025-03-06 PROCEDURE — 69200 CLEAR OUTER EAR CANAL: CPT | Performed by: OTOLARYNGOLOGY

## 2025-03-06 RX ORDER — OFLOXACIN 3 MG/ML
5 SOLUTION AURICULAR (OTIC) 2 TIMES DAILY
Qty: 3.5 ML | Refills: 0 | Status: SHIPPED | OUTPATIENT
Start: 2025-03-06 | End: 2025-03-13

## 2025-03-06 RX ORDER — SULFAMETHOXAZOLE AND TRIMETHOPRIM 800; 160 MG/1; MG/1
1 TABLET ORAL EVERY 12 HOURS SCHEDULED
Qty: 20 TABLET | Refills: 0 | Status: SHIPPED | OUTPATIENT
Start: 2025-03-06 | End: 2025-03-16

## 2025-03-06 RX ORDER — PREDNISOLONE SODIUM PHOSPHATE 10 MG/ML
SOLUTION/ DROPS OPHTHALMIC
Qty: 3.5 ML | Refills: 0 | Status: SHIPPED | OUTPATIENT
Start: 2025-03-06

## 2025-03-06 NOTE — PROGRESS NOTES
Otolaryngology Head and Neck Surgery History and Physical      Assessment and plan:    1. Ear discharge of left ear  Ambulatory Referral to Otolaryngology    sulfamethoxazole-trimethoprim (BACTRIM DS) 800-160 mg per tablet    Ambulatory Referral to Audiology      2. Nasal septal perforation            1.  Hearing test ordered  2.  Culture of left EAC obtained, F/U on micro results  3.  Prescribed Bactrim for suspected Pseudomonas/staph aureus infection with left mastoid. Stop taking Augmentin  4.  Advised to blow nose very gently, one-sided a time  5.  Left PE tube removed at today's visit.    Chief complaint    Chief Complaint   Patient presents with    Ear Problem     Pt stated had ear tubes inserted about 3 years ago was seen in the er ([facility]) and was told has an ear infection was prescribed antibiotics that are not helping        History of the Present Illness    Independent Historian   N      Relationship  JAS    Daniel Wagner is a 60 y.o. who presents for evaluation of left ear drainage for the past 1 year.  The patient was seen by Dr. Guo in 2018 and underwent left myringotomy tube placement in the office.  He subsequently went to [facility] and on 11/18/2021 underwent left myringotomy tube placement in the office with Dr. Yuri Whittaker.       Review of Systems   Constitutional: Negative.    HENT:  Positive for ear discharge and ear pain.      Per HPI remainder noncontributory to present complaint    Past Medical History:   Diagnosis Date    Anxiety     Asthma     Peterson esophagus     Peterson's esophagus     Bowel obstruction (HCC)     Cervical radiculopathy     Chest pain     Last assessed - 7/31/17    Chronic GERD     Last assessed - 3/11/16    COPD (chronic obstructive pulmonary disease) (HCC)     Hep C w/o coma, chronic (HCC)     treated    Hepatic disease     Cirrhosis    Hernia     History of neck problems     Hypercholesterolemia     Hypertension     Kidney disease     Kidney stones      Paresthesia of upper extremity     SOB (shortness of breath) on exertion     Wears glasses        Past Surgical History:   Procedure Laterality Date    APPENDECTOMY      ESOPHAGOGASTRODUODENOSCOPY      HERNIA REPAIR      Resolved     CO ESOPHAGOGASTRODUODENOSCOPY TRANSORAL DIAGNOSTIC N/A 11/30/2017    Procedure: ESOPHAGOGASTRODUODENOSCOPY (EGD);  Surgeon: Jazmyne Lynn DO;  Location:  GI LAB;  Service: Gastroenterology    TONSILLECTOMY      WISDOM TOOTH EXTRACTION         Social History     Socioeconomic History    Marital status: Single     Spouse name: Not on file    Number of children: 4    Years of education: Not on file    Highest education level: Not on file   Occupational History    Occupation: Permanent Disability      Comment: [employer]-not looking for work    Tobacco Use    Smoking status: Every Day     Current packs/day: 0.50     Average packs/day: 0.5 packs/day for 48.2 years (24.1 ttl pk-yrs)     Types: Cigarettes     Start date: 1977    Smokeless tobacco: Current    Tobacco comments:     since 12 yrs old, Smokes less than 1 pack per day  per [employer]    Vaping Use    Vaping status: Never Used   Substance and Sexual Activity    Alcohol use: Not Currently    Drug use: Yes     Types: Methamphetamines     Comment: last use 5 days ago    Sexual activity: Yes   Other Topics Concern    Not on file   Social History Narrative    On permanent disability    Unemployed, not looking for work          Social Drivers of Health     Financial Resource Strain: Not on file   Food Insecurity: Not on file   Transportation Needs: Not on file   Physical Activity: Not on file   Stress: Not on file   Social Connections: Not on file   Intimate Partner Violence: Not on file   Housing Stability: Not on file       Family History   Problem Relation Age of Onset    Hyperlipidemia Mother     Cancer Mother         Malignant Neoplasm     Heart attack Father     Lung cancer Maternal Grandmother     Lung cancer Maternal Grandfather      Heart attack Maternal Grandfather     No Known Problems Paternal Grandmother     No Known Problems Paternal Grandfather     Other Other         Cardiac Disorder    Stroke Other          There were no vitals taken for this visit.      Current Outpatient Medications:     sulfamethoxazole-trimethoprim (BACTRIM DS) 800-160 mg per tablet, Take 1 tablet by mouth every 12 (twelve) hours for 10 days, Disp: 20 tablet, Rfl: 0    albuterol (Ventolin HFA) 90 mcg/act inhaler, Inhale 2 puffs every 4 (four) hours as needed for wheezing or shortness of breath, Disp: 18 g, Rfl: 2    amLODIPine (NORVASC) 10 mg tablet, Take 1 tablet (10 mg total) by mouth daily, Disp: 30 tablet, Rfl: 3    atorvastatin (LIPITOR) 20 mg tablet, Take 1 tablet (20 mg total) by mouth daily, Disp: 30 tablet, Rfl: 2    budesonide-formoterol (SYMBICORT) 160-4.5 mcg/act inhaler, Inhale 2 puffs 2 (two) times a day Rinse mouth after use., Disp: 10.2 g, Rfl: 3    celecoxib (CeleBREX) 200 mg capsule, Take 1 capsule (200 mg total) by mouth 2 (two) times a day, Disp: 60 capsule, Rfl: 2    losartan (COZAAR) 50 mg tablet, Take 1 tablet (50 mg total) by mouth daily, Disp: 30 tablet, Rfl: 3    omeprazole (PriLOSEC) 40 MG capsule, Take 1 capsule (40 mg total) by mouth daily before breakfast, Disp: 30 capsule, Rfl: 2    tamsulosin (FLOMAX) 0.4 mg, Take 1 capsule (0.4 mg total) by mouth daily with dinner, Disp: 30 capsule, Rfl: 0  No current facility-administered medications for this visit.    Facility-Administered Medications Ordered in Other Visits:     fentaNYL injection, , Intravenous, PRN, Tamir Chaney CRNA    midazolam (VERSED) injection, , Intravenous, PRN, Tamir Chaney CRNA     Physical Exam  Constitutional:       Appearance: Normal appearance.   HENT:      Head: Normocephalic and atraumatic.      Right Ear: Tympanic membrane, ear canal and external ear normal.      Left Ear: Ear canal and external ear normal.      Ears:      Comments: Left EAC moist in the  "vicinity of TM. Tube visualized with granulation tissue over the posteroinferior quadrant.      Nose:      Comments: Nasal septal perforation approx. 12 x 10 mm with scar mucosa, no active ulceration or granulation tissue.   Neurological:      Mental Status: He is alert.       Procedure: Left PE tube removed using an alligator forceps. The patient tolerated the procedure well.     Pertinent Notes / Tests / Data reviewed: CT temporal bone from 2/15/25 and CT facial bones from 7/12/14 reviewed.     Data with independent Interpretation    Disclosure: Voice to text software was used in the preparation of this document and could have resulted in translational errors.      Occasional wrong word or \"sound a like\" substitutions may have occurred due to the inherent limitations of voice recognition software.  Read the chart carefully and recognize, using context, where substitutions have occurred.  "

## 2025-03-06 NOTE — TELEPHONE ENCOUNTER
"PT called in following-up on script for x2 different ear drops since he hasn't heard back yet. PT reports that his ear is bleeding since the tube was removed and Dr. Prasad was supposed to prescribe ear drops.     Called [facility] [place] office clinical backline . On hold approx 5 min and line disconnected. Epic Chat message sent to  ENT provider on call, Dr. Farmer, but has a \"busy\" status. Attempted to send Epic Chat message to Dr. Prasad, but he also had a \"busy\" status.     Updated PT and asked if he felt comfortable waiting for scripts for ear drops tomorrow. PT immediately frustrated and firmly stated, \"I want this handled by tomorrow morning. If I have to go back into the office, then someone will end up in the hospital and someone will end up in [facility].\" Clarified threatening statement with PT and PT repeated and confirmed. Advised PT that we do not tolerate threats. PT then ended call.         "

## 2025-03-06 NOTE — TELEPHONE ENCOUNTER
PT reports that he was told during today's office visit that he would also be prescribed x2 ear drops. PT at pharmacy now, and they only have a script for pills.     Please send script ASAP to [facility] - [address]

## 2025-03-06 NOTE — TELEPHONE ENCOUNTER
"Received reply from Dr. Farmer: \"I would have to defer to keene.  I would rx something he put in his note, but if its not written I  don't feel comfortable writing an rx for a patient I haven't seen.  I am not sure what keene would have ordered\"  "

## 2025-03-08 LAB
BACTERIA EAR AEROBE CULT: ABNORMAL
GRAM STN SPEC: ABNORMAL
GRAM STN SPEC: ABNORMAL

## 2025-03-17 ENCOUNTER — TELEPHONE (OUTPATIENT)
Dept: GASTROENTEROLOGY | Facility: CLINIC | Age: 60
End: 2025-03-17

## 2025-03-17 NOTE — TELEPHONE ENCOUNTER
Pt is due for an EGD/Colonoscopy with Dr Drummond. Orders created.  Called and spoke to pt whom informed will call us back to schedule as mom is having health issues.  Recall letter mailed as reminder.

## 2025-03-24 ENCOUNTER — OFFICE VISIT (OUTPATIENT)
Dept: FAMILY MEDICINE CLINIC | Facility: CLINIC | Age: 60
End: 2025-03-24
Payer: MEDICARE

## 2025-03-24 VITALS
WEIGHT: 218 LBS | TEMPERATURE: 96.6 F | BODY MASS INDEX: 28.89 KG/M2 | HEIGHT: 73 IN | HEART RATE: 78 BPM | RESPIRATION RATE: 18 BRPM | SYSTOLIC BLOOD PRESSURE: 142 MMHG | DIASTOLIC BLOOD PRESSURE: 82 MMHG | OXYGEN SATURATION: 95 %

## 2025-03-24 DIAGNOSIS — H66.92 CHRONIC OTITIS MEDIA OF LEFT EAR: Primary | ICD-10-CM

## 2025-03-24 DIAGNOSIS — L92.9 GRANULATION TISSUE: ICD-10-CM

## 2025-03-24 PROCEDURE — 99214 OFFICE O/P EST MOD 30 MIN: CPT | Performed by: FAMILY MEDICINE

## 2025-03-24 RX ORDER — OFLOXACIN 3 MG/ML
5 SOLUTION AURICULAR (OTIC) 2 TIMES DAILY
Qty: 5 ML | Refills: 0 | Status: SHIPPED | OUTPATIENT
Start: 2025-03-24 | End: 2025-04-03

## 2025-03-24 NOTE — PROGRESS NOTES
Name: Daniel Wagner      : 1965     MRN: [id number]  Encounter Provider: Johana Flores MD  Encounter Date: 3/24/2025  Encounter department: [facility] MEDICINE    Assessment & Plan  Chronic otitis media of left ear  He likely has chronic otitis would recommend Ocuflox and Augmentin is a recent ear fluid culture suggestive of cough gram-positive cocci in pairs  Orders:    ofloxacin (FLOXIN) 0.3 % otic solution; Administer 5 drops into the left ear 2 (two) times a day for 10 days    amoxicillin-clavulanate (AUGMENTIN) 875-125 mg per tablet; Take 1 tablet by mouth every 12 (twelve) hours for 10 days    Granulation tissue  Recommend follow-up with dermatology  Orders:    Ambulatory Referral to Dermatology; Future                   Read package inserts for all medications before starting a new medications, call me if you have any questions.    Patient was given opportunity to ask questions and all questions were answered.    Subjective:     Daniel Wagner is a 60 y.o. male.    States his left ear tube was recently removed and he is experiencing left ear pain again he will also be taking a flight to [place] to help his mother who is undergoing surgery, does report sore throat    States he had a burn on the hand and now he has a bump there which is not going away for months        Past Medical History:   Diagnosis Date    Anxiety     Asthma     Peterson esophagus     Peterson's esophagus     Bowel obstruction (HCC)     Cervical radiculopathy     Chest pain     Last assessed - 17    Chronic GERD     Last assessed - 3/11/16    COPD (chronic obstructive pulmonary disease) (HCC)     Foot ulcer (HCC)     Hep C w/o coma, chronic (HCC)     treated    Hepatic disease     Cirrhosis    Hernia     History of neck problems     Hypercholesterolemia     Hypertension     Kidney disease     Kidney stones     Paresthesia of upper extremity     SOB (shortness of breath) on exertion     Wears glasses         Family History   Problem Relation Age of Onset    Hyperlipidemia Mother     Cancer Mother         Malignant Neoplasm     Heart attack Father     Lung cancer Maternal Grandmother     Lung cancer Maternal Grandfather     Heart attack Maternal Grandfather     No Known Problems Paternal Grandmother     No Known Problems Paternal Grandfather     Other Other         Cardiac Disorder    Stroke Other        Past Surgical History:   Procedure Laterality Date    APPENDECTOMY      ESOPHAGOGASTRODUODENOSCOPY      HERNIA REPAIR      Resolved     ID ESOPHAGOGASTRODUODENOSCOPY TRANSORAL DIAGNOSTIC N/A 11/30/2017    Procedure: ESOPHAGOGASTRODUODENOSCOPY (EGD);  Surgeon: Jazmyne Lynn DO;  Location: [facility];  Service: Gastroenterology    TONSILLECTOMY      WISDOM TOOTH EXTRACTION          reports that he has been smoking cigarettes. He started smoking about 48 years ago. He has a 24.1 pack-year smoking history. He uses smokeless tobacco. He reports that he does not currently use alcohol. He reports current drug use. Drug: Methamphetamines.      Current Outpatient Medications:     albuterol (Ventolin HFA) 90 mcg/act inhaler, Inhale 2 puffs every 4 (four) hours as needed for wheezing or shortness of breath, Disp: 18 g, Rfl: 2    amLODIPine (NORVASC) 10 mg tablet, Take 1 tablet (10 mg total) by mouth daily, Disp: 30 tablet, Rfl: 3    amoxicillin-clavulanate (AUGMENTIN) 875-125 mg per tablet, Take 1 tablet by mouth every 12 (twelve) hours for 10 days, Disp: 20 tablet, Rfl: 0    atorvastatin (LIPITOR) 20 mg tablet, Take 1 tablet (20 mg total) by mouth daily, Disp: 30 tablet, Rfl: 2    budesonide-formoterol (SYMBICORT) 160-4.5 mcg/act inhaler, Inhale 2 puffs 2 (two) times a day Rinse mouth after use., Disp: 10.2 g, Rfl: 3    celecoxib (CeleBREX) 200 mg capsule, Take 1 capsule (200 mg total) by mouth 2 (two) times a day, Disp: 60 capsule, Rfl: 2    losartan (COZAAR) 50 mg tablet, Take 1 tablet (50 mg total) by mouth daily, Disp:  30 tablet, Rfl: 3    ofloxacin (FLOXIN) 0.3 % otic solution, Administer 5 drops into the left ear 2 (two) times a day for 10 days, Disp: 5 mL, Rfl: 0    omeprazole (PriLOSEC) 40 MG capsule, Take 1 capsule (40 mg total) by mouth daily before breakfast, Disp: 30 capsule, Rfl: 2    prednisoLONE sodium phosphate (INFLAMASE FORTE) 1 % ophthalmic solution, Applied to left ear twice daily in combination with Floxin eardrops, Disp: 3.5 mL, Rfl: 0    tamsulosin (FLOMAX) 0.4 mg, Take 1 capsule (0.4 mg total) by mouth daily with dinner, Disp: 30 capsule, Rfl: 0  No current facility-administered medications for this visit.    Facility-Administered Medications Ordered in Other Visits:     fentaNYL injection, , Intravenous, PRN, Tamir Chaney, CRNA    midazolam (VERSED) injection, , Intravenous, PRN, Tamir Chaney, CRNA    The following portions of the patient's history were reviewed and updated as appropriate: allergies, current medications, past family history, past medical history, past social history, past surgical history and problem list.    Review of Systems   Constitutional:  Negative for chills and fever.   HENT:  Positive for ear pain and sore throat. Negative for congestion and rhinorrhea.    Eyes:  Negative for discharge, redness and itching.   Respiratory:  Negative for chest tightness, shortness of breath and wheezing.    Cardiovascular:  Negative for chest pain and palpitations.   Gastrointestinal:  Negative for abdominal pain, constipation and diarrhea.   Genitourinary:  Negative for dysuria.   Skin:  Positive for rash. Negative for pallor.   Neurological:  Negative for dizziness, weakness, numbness and headaches.         PHQ-2/9 Depression Screening    Little interest or pleasure in doing things: 0 - not at all  Feeling down, depressed, or hopeless: 0 - not at all  PHQ-2 Score: 0  PHQ-2 Interpretation: Negative depression screen             Objective:    /82 (BP Location: Right arm, Patient Position:  "Sitting, Cuff Size: Large)   Pulse 78   Temp (!) 96.6 °F (35.9 °C) (Tympanic)   Resp 18   Ht 6' 1\" (1.854 m)   Wt 98.9 kg (218 lb)   SpO2 95%   BMI 28.76 kg/m²      Physical Exam  Vitals and nursing note reviewed.   Constitutional:       Appearance: Normal appearance.   HENT:      Right Ear: External ear normal. A middle ear effusion is present. No mastoid tenderness. Tympanic membrane is scarred.      Left Ear: External ear normal. A middle ear effusion is present. No mastoid tenderness. Tympanic membrane is scarred.   Eyes:      General:         Right eye: No discharge.         Left eye: No discharge.      Conjunctiva/sclera: Conjunctivae normal.   Cardiovascular:      Rate and Rhythm: Normal rate and regular rhythm.      Heart sounds: No murmur heard.  Pulmonary:      Effort: Pulmonary effort is normal.      Breath sounds: Normal breath sounds. No wheezing.   Abdominal:      General: There is no distension.      Palpations: Abdomen is soft.      Tenderness: There is no abdominal tenderness.   Musculoskeletal:      Right lower leg: No edema.      Left lower leg: No edema.   Skin:     Findings: Lesion (Well-circumscribed raised granulation tissue noted around the site of burn on the dorsum of the hand) present. No rash.   Neurological:      Mental Status: He is alert. Mental status is at baseline.   Psychiatric:         Mood and Affect: Mood normal.         Thought Content: Thought content normal.           Recent Results (from the past 52 weeks)   ECG 12 lead    Collection Time: 08/15/24 11:41 AM   Result Value Ref Range    Ventricular Rate 76 BPM    Atrial Rate 76 BPM    WA Interval 118 ms    QRSD Interval 86 ms    QT Interval 376 ms    QTC Interval 423 ms    P Axis 56 degrees    QRS Axis 86 degrees    T Wave Axis 77 degrees   CBC and differential    Collection Time: 08/15/24 12:44 PM   Result Value Ref Range    WBC 3.66 (L) 4.31 - 10.16 Thousand/uL    RBC 4.50 3.88 - 5.62 Million/uL    Hemoglobin 13.9 " "12.0 - 17.0 g/dL    Hematocrit 43.2 36.5 - 49.3 %    MCV 96 82 - 98 fL    MCH 30.9 26.8 - 34.3 pg    MCHC 32.2 31.4 - 37.4 g/dL    RDW 13.5 11.6 - 15.1 %    MPV 9.7 8.9 - 12.7 fL    Platelets 141 (L) 149 - 390 Thousands/uL    nRBC 0 /100 WBCs    Segmented % 57 43 - 75 %    Immature Grans % 0 0 - 2 %    Lymphocytes % 28 14 - 44 %    Monocytes % 11 4 - 12 %    Eosinophils Relative 3 0 - 6 %    Basophils Relative 1 0 - 1 %    Absolute Neutrophils 2.08 1.85 - 7.62 Thousands/µL    Absolute Immature Grans 0.01 0.00 - 0.20 Thousand/uL    Absolute Lymphocytes 1.03 0.60 - 4.47 Thousands/µL    Absolute Monocytes 0.41 0.17 - 1.22 Thousand/µL    Eosinophils Absolute 0.10 0.00 - 0.61 Thousand/µL    Basophils Absolute 0.03 0.00 - 0.10 Thousands/µL   Comprehensive metabolic panel    Collection Time: 08/15/24 12:44 PM   Result Value Ref Range    Sodium 138 135 - 147 mmol/L    Potassium 3.9 3.5 - 5.3 mmol/L    Chloride 107 96 - 108 mmol/L    CO2 25 21 - 32 mmol/L    ANION GAP 6 4 - 13 mmol/L    BUN 15 5 - 25 mg/dL    Creatinine 0.95 0.60 - 1.30 mg/dL    Glucose 126 65 - 140 mg/dL    Calcium 9.0 8.4 - 10.2 mg/dL    AST 17 13 - 39 U/L    ALT 19 7 - 52 U/L    Alkaline Phosphatase 67 34 - 104 U/L    Total Protein 6.8 6.4 - 8.4 g/dL    Albumin 4.1 3.5 - 5.0 g/dL    Total Bilirubin 0.37 0.20 - 1.00 mg/dL    eGFR 86 ml/min/1.73sq m   Lipase    Collection Time: 08/15/24 12:44 PM   Result Value Ref Range    Lipase 15 11 - 82 u/L   HS Troponin 0hr (reflex protocol)    Collection Time: 08/15/24 12:44 PM   Result Value Ref Range    hs TnI 0hr 6 \"Refer to ACS Flowchart\"- see link ng/L   B-Type Natriuretic Peptide(BNP)    Collection Time: 08/15/24 12:44 PM   Result Value Ref Range    BNP 41 0 - 100 pg/mL   HS Troponin I 2hr    Collection Time: 08/15/24  3:01 PM   Result Value Ref Range    hs TnI 2hr 5 \"Refer to ACS Flowchart\"- see link ng/L    Delta 2hr hsTnI -1 <20 ng/L   Lipid panel    Collection Time: 11/25/24 10:18 AM   Result Value Ref Range "    Cholesterol 175 See Comment mg/dL    Triglycerides 78 See Comment mg/dL    HDL, Direct 47 >=40 mg/dL    LDL Calculated 112 (H) 0 - 100 mg/dL    Non-HDL-Chol (CHOL-HDL) 128 mg/dl   PSA, Total Screen    Collection Time: 11/25/24 10:18 AM   Result Value Ref Range    PSA 0.263 0.000 - 4.000 ng/mL   CBC and differential    Collection Time: 11/25/24 10:18 AM   Result Value Ref Range    WBC 5.47 4.31 - 10.16 Thousand/uL    RBC 4.66 3.88 - 5.62 Million/uL    Hemoglobin 14.2 12.0 - 17.0 g/dL    Hematocrit 44.3 36.5 - 49.3 %    MCV 95 82 - 98 fL    MCH 30.5 26.8 - 34.3 pg    MCHC 32.1 31.4 - 37.4 g/dL    RDW 13.4 11.6 - 15.1 %    MPV 9.6 8.9 - 12.7 fL    Platelets 184 149 - 390 Thousands/uL    nRBC 0 /100 WBCs    Segmented % 67 43 - 75 %    Immature Grans % 0 0 - 2 %    Lymphocytes % 20 14 - 44 %    Monocytes % 10 4 - 12 %    Eosinophils Relative 2 0 - 6 %    Basophils Relative 1 0 - 1 %    Absolute Neutrophils 3.73 1.85 - 7.62 Thousands/µL    Absolute Immature Grans 0.02 0.00 - 0.20 Thousand/uL    Absolute Lymphocytes 1.08 0.60 - 4.47 Thousands/µL    Absolute Monocytes 0.53 0.17 - 1.22 Thousand/µL    Eosinophils Absolute 0.08 0.00 - 0.61 Thousand/µL    Basophils Absolute 0.03 0.00 - 0.10 Thousands/µL   Comprehensive metabolic panel    Collection Time: 11/25/24 10:18 AM   Result Value Ref Range    Sodium 139 135 - 147 mmol/L    Potassium 3.6 3.5 - 5.3 mmol/L    Chloride 104 96 - 108 mmol/L    CO2 27 21 - 32 mmol/L    ANION GAP 8 4 - 13 mmol/L    BUN 18 5 - 25 mg/dL    Creatinine 1.06 0.60 - 1.30 mg/dL    Glucose 126 65 - 140 mg/dL    Calcium 9.9 8.4 - 10.2 mg/dL    AST 19 13 - 39 U/L    ALT 32 7 - 52 U/L    Alkaline Phosphatase 72 34 - 104 U/L    Total Protein 7.6 6.4 - 8.4 g/dL    Albumin 4.4 3.5 - 5.0 g/dL    Total Bilirubin 0.46 0.20 - 1.00 mg/dL    eGFR 75 ml/min/1.73sq m   Protime-INR    Collection Time: 11/25/24 10:18 AM   Result Value Ref Range    Protime 14.4 12.3 - 15.0 seconds    INR 1.08 0.85 - 1.19   AFP tumor  marker    Collection Time: 11/25/24 10:18 AM   Result Value Ref Range    AFP TUMOR MARKER 2.24 0.00 - 9.00 ng/mL   Chronic Hepatitis Panel    Collection Time: 11/25/24 10:18 AM   Result Value Ref Range    Hepatitis B Surface Ag Non-reactive Non-Reactive    Hepatitis C Ab Reactive (A) Non-Reactive    Hep B C IgM Non-reactive Non-Reactive    Hep B Core Total Ab Reactive (A) Non-Reactive   Hepatitis B surface antibody    Collection Time: 11/25/24 10:18 AM   Result Value Ref Range    Hep B S Ab 51.40 3-500 mIU/mL mIU/mL   Hepatitis A antibody, total    Collection Time: 11/25/24 10:18 AM   Result Value Ref Range    Hep A Total Ab Reactive (A) Non-Reactive   Hepatitis C RNA, Quantitative PCR    Collection Time: 11/25/24 10:18 AM    Specimen: Blood   Result Value Ref Range    HCV TARGET Not Detected Not Detected   TIBC Panel (incl. Iron, TIBC, % Iron Saturation)    Collection Time: 11/25/24 10:18 AM   Result Value Ref Range    Iron Saturation 26 15 - 50 %    TIBC 291 250 - 450 ug/dL    Iron 76 50 - 212 ug/dL    UIBC 215 155 - 355 ug/dL   Ferritin    Collection Time: 11/25/24 10:18 AM   Result Value Ref Range    Ferritin 80 24 - 336 ng/mL   Type and screen    Collection Time: 11/25/24 10:18 AM   Result Value Ref Range    ABO Grouping A     Rh Factor Positive     Antibody Screen Negative     Specimen Expiration Date [id number]    ABORh Recheck - Contact Blood Bank Prior to Collection    Collection Time: 12/12/24  9:30 AM   Result Value Ref Range    ABO Grouping A     Rh Factor Positive    Wound culture and Gram stain    Collection Time: 12/23/24  6:53 PM    Specimen: Wound   Result Value Ref Range    Wound Culture (A)      1+ Growth of Methicillin Resistant Staphylococcus aureus    Gram Stain Result No Polys or Bacteria seen        Susceptibility    Methicillin Resistant Staphylococcus aureus - ROSIO     Cefazolin ($) <=8.00 Resistant ug/ml     Clindamycin ($) <=0.25 Susceptible ug/ml     Erythromycin ($$$$) >4.00 Resistant  "ug/ml     Gentamicin ($$) <=4 Susceptible ug/ml     Oxacillin >2.00 Resistant ug/ml     Penicillin >2.000 Resistant ug/ml     Tetracycline <=4 Susceptible ug/ml     Trimethoprim + Sulfamethoxazole ($$$) <=0.5/9.5 Susceptible ug/ml     Vancomycin ($) 1.00 Susceptible ug/ml   POCT urine dip    Collection Time: 12/31/24  9:32 AM   Result Value Ref Range    LEUKOCYTE ESTERASE,UA -     NITRITE,UA -     SL AMB POCT UROBILINOGEN 0.2     POCT URINE PROTEIN -      PH,UA 6.0     BLOOD,UA -     SPECIFIC GRAVITY,UA 1.030     KETONES,UA -     BILIRUBIN,UA -     GLUCOSE, UA -      COLOR,UA yellow     CLARITY,UA clear    POCT Measure PVR    Collection Time: 12/31/24  9:36 AM   Result Value Ref Range    POST-VOID RESIDUAL VOLUME, ML POC 14 mL   ECG 12 lead    Collection Time: 02/25/25  2:03 PM   Result Value Ref Range    Ventricular Rate 84 BPM    Atrial Rate 84 BPM    MO Interval 120 ms    QRSD Interval 84 ms    QT Interval 368 ms    QTC Interval 434 ms    P Axis 36 degrees    QRS Axis 82 degrees    T Wave Axis 76 degrees   Ear culture and Gram stain    Collection Time: 03/06/25  7:43 PM    Specimen: Ear   Result Value Ref Range    Ear Culture 4+ Growth of     Gram Stain Result No polys seen (A)     Gram Stain Result Rare Gram positive cocci in pairs (A)        Laboratory Results: I have personally reviewed the pertinent laboratory results/reports         Disclaimer: Portions of the record may have been created with voice recognition software. Occasional wrong word or \"sound a like\" substitutions may have occurred due to the inherent limitations of voice recognition software. Read the chart carefully and recognize, using context, where substitutions have occurred. I have used the Epic copy/forward function to compose this note. I have reviewed my current note to ensure it reflects the current patient status, exam, assessment and plan.  "

## 2025-03-27 ENCOUNTER — OFFICE VISIT (OUTPATIENT)
Dept: FAMILY MEDICINE CLINIC | Facility: CLINIC | Age: 60
End: 2025-03-27
Payer: MEDICARE

## 2025-03-27 VITALS
SYSTOLIC BLOOD PRESSURE: 160 MMHG | OXYGEN SATURATION: 99 % | HEIGHT: 73 IN | DIASTOLIC BLOOD PRESSURE: 90 MMHG | WEIGHT: 219 LBS | HEART RATE: 78 BPM | RESPIRATION RATE: 16 BRPM | BODY MASS INDEX: 29.03 KG/M2

## 2025-03-27 DIAGNOSIS — I10 PRIMARY HYPERTENSION: Primary | ICD-10-CM

## 2025-03-27 DIAGNOSIS — K21.9 CHRONIC GERD: ICD-10-CM

## 2025-03-27 DIAGNOSIS — E78.5 DYSLIPIDEMIA: ICD-10-CM

## 2025-03-27 DIAGNOSIS — J43.9 PULMONARY EMPHYSEMA, UNSPECIFIED EMPHYSEMA TYPE (HCC): ICD-10-CM

## 2025-03-27 DIAGNOSIS — F17.200 TOBACCO USE DISORDER: ICD-10-CM

## 2025-03-27 PROBLEM — Z00.00 ENCOUNTER FOR ANNUAL PHYSICAL EXAM: Status: ACTIVE | Noted: 2025-03-27

## 2025-03-27 PROCEDURE — 99214 OFFICE O/P EST MOD 30 MIN: CPT | Performed by: FAMILY MEDICINE

## 2025-03-27 RX ORDER — LOSARTAN POTASSIUM 100 MG/1
100 TABLET ORAL DAILY
Qty: 90 TABLET | Refills: 2 | Status: SHIPPED | OUTPATIENT
Start: 2025-03-27

## 2025-03-27 RX ORDER — PREDNISOLONE ACETATE 10 MG/ML
SUSPENSION/ DROPS OPHTHALMIC
COMMUNITY
Start: 2025-03-06

## 2025-03-27 NOTE — ASSESSMENT & PLAN NOTE
in November 2024. Continue atorvastatin 20 mg daily at bedtime. Advised pt to follow a low cholesterol diet and to exercise on a regular basis.

## 2025-03-27 NOTE — PROGRESS NOTES
Name: Daniel Wagner      : 1965      MRN: [id number]  Encounter Provider: Richard Diaz MD  Encounter Date: 3/27/2025   Encounter department: [facility] MEDICINE  :  Assessment & Plan  Primary hypertension  Blood pressure high. Increase losartan to 100 mg daily and continue amlodipine 10 mg daily. Pt advised to follow a low Na diet and to exercise on a regular basis.    Orders:  •  losartan (COZAAR) 100 MG tablet; Take 1 tablet (100 mg total) by mouth daily    Dyslipidemia   in 2024. Continue atorvastatin 20 mg daily at bedtime. Advised pt to follow a low cholesterol diet and to exercise on a regular basis.       Pulmonary emphysema, unspecified emphysema type (HCC)  Patient gets shortness of breath. Continue Symbicort 160-4.5 2 puffs twice a day and rescue inhaler as needed. Will refer to Pulmonary for evaluation and management. Patient advised to quit smoking.   Orders:  •  Ambulatory Referral to Pulmonology; Future    Chronic GERD  No recent symptoms. Continue omeprazole 40 mg daily and GERD diet. Last EGD was in .        Tobacco use disorder  Patient smokes 1/2 packs per day. Discussed quitting with patient.   Orders:  •  CT lung screening program; Future           History of Present Illness   Patient here for follow-up Hypertension, Hyperlipidemia, COPD, GERD, Tobacco use. Patient doing ok. Gets shortness of breath at times. Blood pressure has been high. Still smokes 1/2 packs per day. Saw ENT for left ear pain and had tube pulled. Was doing better but hurts again 1 week ago. Has low grade fever.       Review of Systems   Constitutional:  Negative for chills, fatigue, fever and unexpected weight change.   HENT:  Positive for congestion and ear pain. Negative for postnasal drip, rhinorrhea, sinus pressure, sinus pain, sore throat and trouble swallowing.    Respiratory:  Positive for shortness of breath. Negative for cough, chest tightness and wheezing.   "  Cardiovascular:  Negative for chest pain.   Gastrointestinal:  Negative for abdominal pain, constipation, diarrhea, nausea and vomiting.   Musculoskeletal:  Positive for back pain and neck pain. Negative for arthralgias.   Skin:  Negative for rash.   Neurological:  Negative for dizziness and headaches.   Psychiatric/Behavioral:  Negative for dysphoric mood. The patient is not nervous/anxious.        Objective   /90 (BP Location: Left arm, Patient Position: Sitting, Cuff Size: Standard)   Pulse 78   Resp 16   Ht 6' 1\" (1.854 m)   Wt 99.3 kg (219 lb)   SpO2 99%   BMI 28.89 kg/m²      Physical Exam  Vitals and nursing note reviewed.   Constitutional:       General: He is not in acute distress.     Appearance: Normal appearance. He is normal weight. He is not ill-appearing.   HENT:      Right Ear: Tympanic membrane, ear canal and external ear normal.      Left Ear: Tympanic membrane, ear canal and external ear normal.      Nose: No congestion or rhinorrhea.      Mouth/Throat:      Mouth: Mucous membranes are moist.      Pharynx: Oropharynx is clear. No posterior oropharyngeal erythema.   Neck:      Vascular: No carotid bruit.   Cardiovascular:      Rate and Rhythm: Normal rate and regular rhythm.      Heart sounds: Normal heart sounds. No murmur heard.  Pulmonary:      Effort: Pulmonary effort is normal.      Breath sounds: Normal breath sounds. No wheezing or rhonchi.   Musculoskeletal:      Cervical back: Normal range of motion and neck supple. No muscular tenderness.      Right lower leg: No edema.      Left lower leg: No edema.   Lymphadenopathy:      Cervical: No cervical adenopathy.   Neurological:      Mental Status: He is alert.   Psychiatric:         Mood and Affect: Mood normal.         Behavior: Behavior normal.         Thought Content: Thought content normal.         Judgment: Judgment normal.         "

## 2025-03-27 NOTE — ASSESSMENT & PLAN NOTE
Blood pressure high. Increase losartan to 100 mg daily and continue amlodipine 10 mg daily. Pt advised to follow a low Na diet and to exercise on a regular basis.    Orders:  •  losartan (COZAAR) 100 MG tablet; Take 1 tablet (100 mg total) by mouth daily

## 2025-03-27 NOTE — ASSESSMENT & PLAN NOTE
Patient smokes 1/2 packs per day. Discussed quitting with patient.   Orders:  •  CT lung screening program; Future

## 2025-03-27 NOTE — ASSESSMENT & PLAN NOTE
Patient gets shortness of breath. Continue Symbicort 160-4.5 2 puffs twice a day and rescue inhaler as needed. Will refer to Pulmonary for evaluation and management. Patient advised to quit smoking.   Orders:  •  Ambulatory Referral to Pulmonology; Future

## 2025-04-21 ENCOUNTER — TELEPHONE (OUTPATIENT)
Age: 60
End: 2025-04-21

## 2025-04-21 DIAGNOSIS — J44.9 CHRONIC OBSTRUCTIVE PULMONARY DISEASE, UNSPECIFIED COPD TYPE (HCC): ICD-10-CM

## 2025-04-21 DIAGNOSIS — E78.5 DYSLIPIDEMIA: ICD-10-CM

## 2025-04-21 DIAGNOSIS — I10 PRIMARY HYPERTENSION: ICD-10-CM

## 2025-04-21 DIAGNOSIS — M54.2 NECK PAIN: ICD-10-CM

## 2025-04-21 DIAGNOSIS — K21.9 CHRONIC GERD: ICD-10-CM

## 2025-04-21 NOTE — TELEPHONE ENCOUNTER
Pt called to reschedule his FU appt that he had to cancel on 04/03/25.  He said that at his appt on 03/06 Dr. Prasad took out ear tube and now its starting to clog up again.  Provided number to call Audiology to have hearing test done. Please call pt to get his FU appt rescheduled.

## 2025-04-22 RX ORDER — ATORVASTATIN CALCIUM 20 MG/1
20 TABLET, FILM COATED ORAL DAILY
Qty: 30 TABLET | Refills: 5 | Status: SHIPPED | OUTPATIENT
Start: 2025-04-22

## 2025-04-22 RX ORDER — CELECOXIB 200 MG/1
200 CAPSULE ORAL 2 TIMES DAILY
Qty: 60 CAPSULE | Refills: 5 | Status: SHIPPED | OUTPATIENT
Start: 2025-04-22

## 2025-04-22 RX ORDER — OMEPRAZOLE 40 MG/1
40 CAPSULE, DELAYED RELEASE ORAL
Qty: 30 CAPSULE | Refills: 5 | Status: SHIPPED | OUTPATIENT
Start: 2025-04-22 | End: 2025-10-19

## 2025-04-22 RX ORDER — BUDESONIDE AND FORMOTEROL FUMARATE DIHYDRATE 160; 4.5 UG/1; UG/1
2 AEROSOL RESPIRATORY (INHALATION) 2 TIMES DAILY
Qty: 10.2 G | Refills: 5 | Status: SHIPPED | OUTPATIENT
Start: 2025-04-22

## 2025-04-22 RX ORDER — LOSARTAN POTASSIUM 100 MG/1
100 TABLET ORAL DAILY
Qty: 90 TABLET | Refills: 1 | Status: SHIPPED | OUTPATIENT
Start: 2025-04-22

## 2025-04-23 ENCOUNTER — TELEPHONE (OUTPATIENT)
Dept: FAMILY MEDICINE CLINIC | Facility: CLINIC | Age: 60
End: 2025-04-23

## 2025-04-23 NOTE — TELEPHONE ENCOUNTER
Fax from [facility] pharmacy requesting PA on Budesonide-Formoterol Fumarate 160-4.5mcg/Act Aerosol   Key:  [id number]

## 2025-04-24 NOTE — TELEPHONE ENCOUNTER
PA for    budesonide-formoterol (SYMBICORT) 160-4.5 mcg/act inhaler   SUBMITTED to [employer]    via    [x]CMM-KEY: [id number]  []Surescripts-Case ID #   []Availity-Auth ID # NDC #   []Faxed to plan   []Other website   []Phone call Case ID #     [x]PA sent as URGENT    All office notes, labs and other pertaining documents and studies sent. Clinical questions answered. Awaiting determination from insurance company.     Turnaround time for your insurance to make a decision on your Prior Authorization can take 7-21 business days.

## 2025-05-01 ENCOUNTER — OFFICE VISIT (OUTPATIENT)
Dept: FAMILY MEDICINE CLINIC | Facility: CLINIC | Age: 60
End: 2025-05-01
Payer: MEDICARE

## 2025-05-01 VITALS
HEART RATE: 80 BPM | BODY MASS INDEX: 28.49 KG/M2 | RESPIRATION RATE: 16 BRPM | WEIGHT: 215 LBS | SYSTOLIC BLOOD PRESSURE: 140 MMHG | HEIGHT: 73 IN | DIASTOLIC BLOOD PRESSURE: 88 MMHG | OXYGEN SATURATION: 98 %

## 2025-05-01 DIAGNOSIS — E78.5 DYSLIPIDEMIA: ICD-10-CM

## 2025-05-01 DIAGNOSIS — K21.9 CHRONIC GERD: ICD-10-CM

## 2025-05-01 DIAGNOSIS — J43.9 PULMONARY EMPHYSEMA, UNSPECIFIED EMPHYSEMA TYPE (HCC): ICD-10-CM

## 2025-05-01 DIAGNOSIS — F41.9 ANXIETY: Primary | ICD-10-CM

## 2025-05-01 DIAGNOSIS — I10 PRIMARY HYPERTENSION: ICD-10-CM

## 2025-05-01 DIAGNOSIS — R76.8 HEPATITIS C ANTIBODY DETECTED: ICD-10-CM

## 2025-05-01 PROCEDURE — 99214 OFFICE O/P EST MOD 30 MIN: CPT | Performed by: FAMILY MEDICINE

## 2025-05-01 RX ORDER — HYDROCHLOROTHIAZIDE 12.5 MG/1
12.5 TABLET ORAL DAILY
Qty: 30 TABLET | Refills: 3 | Status: SHIPPED | OUTPATIENT
Start: 2025-05-01

## 2025-05-01 RX ORDER — ESCITALOPRAM OXALATE 5 MG/1
5 TABLET ORAL DAILY
Qty: 30 TABLET | Refills: 3 | Status: SHIPPED | OUTPATIENT
Start: 2025-05-01

## 2025-05-01 NOTE — ASSESSMENT & PLAN NOTE
Blood pressure stilla little high. Will add HCTZ 12.5 mg daily. Continue losartan 100 mg daily and amlodipine 10 mg daily. Pt advised to follow a low Na diet and to exercise on a regular basis.    Orders:  •  hydroCHLOROthiazide 12.5 mg tablet; Take 1 tablet (12.5 mg total) by mouth daily

## 2025-05-01 NOTE — PROGRESS NOTES
Name: Daniel Wagner      : 1965      MRN: [id number]  Encounter Provider: Richard Diaz MD  Encounter Date: 2025   Encounter department: [facility]  :  Assessment & Plan  Anxiety  Patient has had increased symptoms for past 6 weeks due to family issues. Will start medication.   Orders:  •  escitalopram (LEXAPRO) 5 mg tablet; Take 1 tablet (5 mg total) by mouth daily    Primary hypertension  Blood pressure stilla little high. Will add HCTZ 12.5 mg daily. Continue losartan 100 mg daily and amlodipine 10 mg daily. Pt advised to follow a low Na diet and to exercise on a regular basis.    Orders:  •  hydroCHLOROthiazide 12.5 mg tablet; Take 1 tablet (12.5 mg total) by mouth daily    Dyslipidemia  LDL was 112 in 2024. Continue atorvastatin 20 mg daily at bedtime. Advised pt to follow a low cholesterol diet and to exercise on a regular basis.       Pulmonary emphysema, unspecified emphysema type (HCC)  No change. Continue Symbicort 160-4.5 2 puffs twice a day and rescue inhaler as needed. Patient was referred to Pulmonary for evaluation and management. Patient advised to quit smoking.        Chronic GERD  Stable. Continue omeprazole 40 mg daily and GERD diet. Last EGD was in .          Hepatitis C antibody detected  Need to check quantitative level and treat if needed. Patient sees Gastroenterology Dr Drummond.   Orders:  •  Hepatitis C RNA, Quantitative PCR, [facility]; Future          Depression Screening and Follow-up Plan: Patient was screened for depression during today's encounter. They screened negative with a PHQ-2 score of 2.        History of Present Illness   Patient here for Anxiety and follow-up Hypertension, Hyperlipidemia, COPD, GERD, Hep C. Patient has had increased anxiety for past 6 weeks due to family issues. Has had decreased sleep and gets heart racing and shortness of breath. No recent GERD. Gets headaches at times. Was on medication in the past for  "anxiety.     Anxiety  Symptoms include nervous/anxious behavior and shortness of breath. Patient reports no chest pain or dizziness.         Review of Systems   Constitutional:  Negative for fatigue and unexpected weight change.   Respiratory:  Positive for shortness of breath. Negative for cough.    Cardiovascular:  Negative for chest pain.   Gastrointestinal:  Negative for abdominal pain, constipation, diarrhea and vomiting.   Musculoskeletal:  Positive for back pain and neck pain. Negative for arthralgias.   Neurological:  Negative for dizziness and headaches.   Psychiatric/Behavioral:  Positive for sleep disturbance. Negative for dysphoric mood. The patient is nervous/anxious.        Objective   /88 (BP Location: Left arm, Patient Position: Sitting, Cuff Size: Standard)   Pulse 80   Resp 16   Ht 6' 1\" (1.854 m)   Wt 97.5 kg (215 lb)   SpO2 98%   BMI 28.37 kg/m²      Physical Exam  Vitals and nursing note reviewed.   Constitutional:       Appearance: Normal appearance. He is normal weight.   Neck:      Vascular: No carotid bruit.   Cardiovascular:      Rate and Rhythm: Normal rate and regular rhythm.      Heart sounds: Normal heart sounds. No murmur heard.  Pulmonary:      Effort: Pulmonary effort is normal.      Breath sounds: Normal breath sounds. No wheezing.   Musculoskeletal:      Cervical back: Normal range of motion and neck supple. No muscular tenderness.      Right lower leg: No edema.      Left lower leg: No edema.   Lymphadenopathy:      Cervical: No cervical adenopathy.   Neurological:      Mental Status: He is alert.   Psychiatric:         Mood and Affect: Mood normal.         Behavior: Behavior normal.         Thought Content: Thought content normal.         Judgment: Judgment normal.         "

## 2025-05-01 NOTE — ASSESSMENT & PLAN NOTE
No change. Continue Symbicort 160-4.5 2 puffs twice a day and rescue inhaler as needed. Patient was referred to Pulmonary for evaluation and management. Patient advised to quit smoking.

## 2025-05-01 NOTE — ASSESSMENT & PLAN NOTE
Patient has had increased symptoms for past 6 weeks due to family issues. Will start medication.   Orders:  •  escitalopram (LEXAPRO) 5 mg tablet; Take 1 tablet (5 mg total) by mouth daily

## 2025-05-01 NOTE — ASSESSMENT & PLAN NOTE
LDL was 112 in November 2024. Continue atorvastatin 20 mg daily at bedtime. Advised pt to follow a low cholesterol diet and to exercise on a regular basis.

## 2025-05-01 NOTE — ASSESSMENT & PLAN NOTE
Need to check quantitative level and treat if needed. Patient sees Gastroenterology Dr Drummond.   Orders:  •  Hepatitis C RNA, [facility] PCR, [facility]; Future

## 2025-05-02 ENCOUNTER — TELEPHONE (OUTPATIENT)
Age: 60
End: 2025-05-02

## 2025-05-02 NOTE — TELEPHONE ENCOUNTER
PA for budesonide formoterol 90 mcg SUBMITTED to [employer]     via    [x]CMM-KEY: [id number]  []Surescripts-Case ID # \  []Availity-Auth ID # NDC #   []Faxed to plan   []Other website   []Phone call Case ID #     []PA sent as URGENT    All office notes, labs and other pertaining documents and studies sent. Clinical questions answered. Awaiting determination from insurance company.     Turnaround time for your insurance to make a decision on your Prior Authorization can take 7-21 business days.

## 2025-05-05 NOTE — TELEPHONE ENCOUNTER
PA for budesonide formoterol 90 mcg SUBMITTED to [employer]      via     [x]CMM-KEY: [id number]  []Surescripts-Case ID # \  []Availity-Auth ID # NDC #   []Faxed to plan   []Other website   []Phone call Case ID #      []PA sent as URGENT     All office notes, labs and other pertaining documents and studies sent. Clinical questions answered. Awaiting determination from insurance company.      Turnaround time for your insurance to make a decision on your Prior Authorization can take 7-21 business days.                       Deleted by: Sully Cope MA at 5/5/2025 12:51 PM

## 2025-05-07 DIAGNOSIS — J44.9 CHRONIC OBSTRUCTIVE PULMONARY DISEASE, UNSPECIFIED COPD TYPE (HCC): ICD-10-CM

## 2025-05-07 RX ORDER — BUDESONIDE AND FORMOTEROL FUMARATE DIHYDRATE 160; 4.5 UG/1; UG/1
2 AEROSOL RESPIRATORY (INHALATION) 2 TIMES DAILY
Qty: 10.2 G | Refills: 5 | Status: SHIPPED | OUTPATIENT
Start: 2025-05-07

## 2025-05-07 NOTE — TELEPHONE ENCOUNTER
Brand Symbicort is covered under patients plan , budesonide formoterol is not covered, please resend script as brand symbicort

## 2025-05-07 NOTE — TELEPHONE ENCOUNTER
PA for budesonide formoterol 90 mcg  CANCELLED due to     [x]Brand name is covered on formulary  []Medication already on Formulary  []Brand Name Preferred  []Patient no longer covered by insurance  []Therapy Changed/Medication Discontinued    Patient advised by     []My Chart Message  []Phone call    Scanned into Media  yes

## 2025-05-12 ENCOUNTER — HOSPITAL ENCOUNTER (EMERGENCY)
Facility: HOSPITAL | Age: 60
Discharge: HOME | End: 2025-05-12
Attending: EMERGENCY MEDICINE
Payer: MEDICARE

## 2025-05-12 ENCOUNTER — OFFICE VISIT (OUTPATIENT)
Dept: SURGERY | Facility: CLINIC | Age: 60
End: 2025-05-12
Payer: MEDICARE

## 2025-05-12 VITALS
OXYGEN SATURATION: 97 % | DIASTOLIC BLOOD PRESSURE: 80 MMHG | HEART RATE: 81 BPM | WEIGHT: 211 LBS | SYSTOLIC BLOOD PRESSURE: 146 MMHG | BODY MASS INDEX: 27.96 KG/M2 | TEMPERATURE: 97.5 F | HEIGHT: 73 IN

## 2025-05-12 VITALS
SYSTOLIC BLOOD PRESSURE: 110 MMHG | DIASTOLIC BLOOD PRESSURE: 59 MMHG | TEMPERATURE: 98.3 F | HEART RATE: 80 BPM | RESPIRATION RATE: 18 BRPM | OXYGEN SATURATION: 96 %

## 2025-05-12 DIAGNOSIS — L73.9 FOLLICULITIS: ICD-10-CM

## 2025-05-12 DIAGNOSIS — R07.9 CHEST PAIN, UNSPECIFIED TYPE: Primary | ICD-10-CM

## 2025-05-12 DIAGNOSIS — I47.10 SVT (SUPRAVENTRICULAR TACHYCARDIA) (HCC): ICD-10-CM

## 2025-05-12 DIAGNOSIS — K43.2 INCISIONAL HERNIA: Primary | ICD-10-CM

## 2025-05-12 LAB
2HR DELTA HS TROPONIN: 15 NG/L
ALBUMIN SERPL BCG-MCNC: 4.3 G/DL (ref 3.5–5)
ALP SERPL-CCNC: 58 U/L (ref 34–104)
ALT SERPL W P-5'-P-CCNC: 18 U/L (ref 7–52)
ANION GAP SERPL CALCULATED.3IONS-SCNC: 8 MMOL/L (ref 4–13)
AST SERPL W P-5'-P-CCNC: 19 U/L (ref 13–39)
BASOPHILS # BLD AUTO: 0.04 THOUSANDS/ÂΜL (ref 0–0.1)
BASOPHILS NFR BLD AUTO: 1 % (ref 0–1)
BILIRUB SERPL-MCNC: 0.58 MG/DL (ref 0.2–1)
BUN SERPL-MCNC: 15 MG/DL (ref 5–25)
CALCIUM SERPL-MCNC: 9.1 MG/DL (ref 8.4–10.2)
CARDIAC TROPONIN I PNL SERPL HS: 23 NG/L (ref ?–50)
CARDIAC TROPONIN I PNL SERPL HS: 8 NG/L (ref ?–50)
CHLORIDE SERPL-SCNC: 111 MMOL/L (ref 96–108)
CO2 SERPL-SCNC: 24 MMOL/L (ref 21–32)
CREAT SERPL-MCNC: 1.19 MG/DL (ref 0.6–1.3)
EOSINOPHIL # BLD AUTO: 0.05 THOUSAND/ÂΜL (ref 0–0.61)
EOSINOPHIL NFR BLD AUTO: 1 % (ref 0–6)
ERYTHROCYTE [DISTWIDTH] IN BLOOD BY AUTOMATED COUNT: 13.7 % (ref 11.6–15.1)
GFR SERPL CREATININE-BSD FRML MDRD: 65 ML/MIN/1.73SQ M
GLUCOSE SERPL-MCNC: 119 MG/DL (ref 65–140)
HCT VFR BLD AUTO: 41.2 % (ref 36.5–49.3)
HGB BLD-MCNC: 13.5 G/DL (ref 12–17)
IMM GRANULOCYTES # BLD AUTO: 0.02 THOUSAND/UL (ref 0–0.2)
IMM GRANULOCYTES NFR BLD AUTO: 1 % (ref 0–2)
LYMPHOCYTES # BLD AUTO: 1.2 THOUSANDS/ÂΜL (ref 0.6–4.47)
LYMPHOCYTES NFR BLD AUTO: 28 % (ref 14–44)
MCH RBC QN AUTO: 30.9 PG (ref 26.8–34.3)
MCHC RBC AUTO-ENTMCNC: 32.8 G/DL (ref 31.4–37.4)
MCV RBC AUTO: 94 FL (ref 82–98)
MONOCYTES # BLD AUTO: 0.35 THOUSAND/ÂΜL (ref 0.17–1.22)
MONOCYTES NFR BLD AUTO: 8 % (ref 4–12)
NEUTROPHILS # BLD AUTO: 2.62 THOUSANDS/ÂΜL (ref 1.85–7.62)
NEUTS SEG NFR BLD AUTO: 61 % (ref 43–75)
NRBC BLD AUTO-RTO: 0 /100 WBCS
PLATELET # BLD AUTO: 153 THOUSANDS/UL (ref 149–390)
PMV BLD AUTO: 9.6 FL (ref 8.9–12.7)
POTASSIUM SERPL-SCNC: 3.3 MMOL/L (ref 3.5–5.3)
PROT SERPL-MCNC: 6.9 G/DL (ref 6.4–8.4)
RBC # BLD AUTO: 4.37 MILLION/UL (ref 3.88–5.62)
SODIUM SERPL-SCNC: 143 MMOL/L (ref 135–147)
WBC # BLD AUTO: 4.28 THOUSAND/UL (ref 4.31–10.16)

## 2025-05-12 PROCEDURE — 85025 COMPLETE CBC W/AUTO DIFF WBC: CPT

## 2025-05-12 PROCEDURE — 99285 EMERGENCY DEPT VISIT HI MDM: CPT | Performed by: EMERGENCY MEDICINE

## 2025-05-12 PROCEDURE — 84484 ASSAY OF TROPONIN QUANT: CPT

## 2025-05-12 PROCEDURE — 80053 COMPREHEN METABOLIC PANEL: CPT

## 2025-05-12 PROCEDURE — 93005 ELECTROCARDIOGRAM TRACING: CPT

## 2025-05-12 PROCEDURE — 99285 EMERGENCY DEPT VISIT HI MDM: CPT

## 2025-05-12 PROCEDURE — 99214 OFFICE O/P EST MOD 30 MIN: CPT | Performed by: SURGERY

## 2025-05-12 PROCEDURE — 36415 COLL VENOUS BLD VENIPUNCTURE: CPT

## 2025-05-12 RX ORDER — DOXYCYCLINE 100 MG/1
100 CAPSULE ORAL EVERY 12 HOURS SCHEDULED
Qty: 14 CAPSULE | Refills: 0 | Status: SHIPPED | OUTPATIENT
Start: 2025-05-12 | End: 2025-05-19

## 2025-05-12 NOTE — DISCHARGE INSTRUCTIONS
Patient Education     Chest Pain, Adult ED   General Information   You came to the Emergency Department (ED) for chest pain. The doctor feels that the risk of a serious cause for your chest pain is low. Many things can cause chest pain. Some are serious things like heart disease or lung disease. Less serious things like stomach problems can also cause chest pain.  The doctors may not be able to find all serious causes of chest pain the first time they see you. It is important that you follow up with your doctor. You may be waiting on some test results. The staff will notify you if there are concerning results.  What care is needed at home?   Call your regular doctor to let them know you were in the ED. Make a follow-up appointment if you were told to.  Follow your regular doctor’s orders to keep your blood pressure, cholesterol, and high blood sugar (diabetes) under control.  If you smoke, try to quit. Your doctor or nurse can help.  Keep a healthy weight. If you are too heavy, lose weight.  Eat a healthy diet, as discussed with your doctor or nurse.  When do I need to get emergency help?   Call for an ambulance if:   You have signs of a heart attack, which may include:  Severe chest pain, pressure, or discomfort with:  Breathing trouble; sweating; upset stomach; or cold, clammy skin.  Pain in your arms, back, or jaw.  Worse pain with activity like walking up stairs.  Fast or irregular heartbeat.  Feeling dizzy, faint, or weak.  When do I need to call the doctor?   You have a fever of 100.4°F (38°C) or higher or chills.  You cough up yellow or green mucus.  You are more tired than normal or more trouble breathing with activity.  You have new or worsening symptoms.  Last Reviewed Date   2020-06-28  Consumer Information Use and Disclaimer   This generalized information is a limited summary of diagnosis, treatment, and/or medication information. It is not meant to be comprehensive and should be used as a tool to help  the user understand and/or assess potential diagnostic and treatment options. It does NOT include all information about conditions, treatments, medications, side effects, or risks that may apply to a specific patient. It is not intended to be medical advice or a substitute for the medical advice, diagnosis, or treatment of a health care provider based on the health care provider's examination and assessment of a patient’s specific and unique circumstances. Patients must speak with a health care provider for complete information about their health, medical questions, and treatment options, including any risks or benefits regarding use of medications. This information does not endorse any treatments or medications as safe, effective, or approved for treating a specific patient. [employer] Inc[employer] and its affiliates disclaim any warranty or liability relating to this information or the use thereof. The use of this information is governed by the Terms of Use, available at https://www.[employer].com/en/know/clinical-effectiveness-terms   Copyright   Copyright © 2024 [employer], Inc[employer] and its affiliates and/or licensors. All rights reserved.

## 2025-05-12 NOTE — PROGRESS NOTES
Name: Daniel Wagner      : 1965      MRN: [id number]  Encounter Provider: Ralph Matos MD  Encounter Date: 2025   Encounter department: [facility] GENERAL SURGERY [place]  :  Assessment & Plan  Incisional hernia    Orders:    Case request operating room: REPAIR HERNIA INCISIONAL 8 cm; Standing    Folliculitis         I am rescheduling him for open repair of incisional hernia.  This will be on July 3, 2025.  He will need bowel prep prior to surgery.  I am also sending an antibiotic prescription for folliculitis.  He already had a signed consent in the chart which is valid till October.  We discussed about the surgery again and possible complications.  He verbalized understanding.    History of Present Illness   60-year-old male patient who was scheduled for open repair of incisional hernia in 2024 came here to reschedule his surgery.  The surgery was canceled due to presence of a foot infection at that time.  He is completely recovered from that.  He says that he has no pain in the hernia right now.  No nausea or vomiting.  Patient is on new blood pressure medications.  He also has a complaint of a painful swelling of her his left wrist.  No drainage.  There is some redness.  He says he is putting some antibiotic lotion on it.      Daniel Wagner is a 60 y.o. male who presents   History obtained from: patient    Review of Systems   Constitutional: Negative.    HENT: Negative.     Eyes: Negative.    Respiratory: Negative.     Cardiovascular: Negative.    Gastrointestinal: Negative.    Endocrine: Negative.    Genitourinary: Negative.    Musculoskeletal: Negative.    Skin: Negative.    Allergic/Immunologic: Negative.    Neurological: Negative.    Hematological: Negative.    Psychiatric/Behavioral: Negative.       Medical History Reviewed by provider this encounter:  Tobacco  Allergies  Meds  Problems  Med Hx  Surg Hx  Fam Hx     .  Past Medical History   Past Medical History:   Diagnosis  Date    Anxiety     Asthma     Peterson esophagus     Peterson's esophagus     Bowel obstruction (HCC)     Cervical radiculopathy     Chest pain     Last assessed - 7/31/17    Chronic GERD     Last assessed - 3/11/16    COPD (chronic obstructive pulmonary disease) (HCC)     Foot ulcer (HCC)     Hep C w/o coma, chronic (HCC)     treated    Hepatic disease     Cirrhosis    Hernia     History of neck problems     Hypercholesterolemia     Hypertension     Kidney disease     Kidney stones     Paresthesia of upper extremity     SOB (shortness of breath) on exertion     Wears glasses      Past Surgical History:   Procedure Laterality Date    APPENDECTOMY      ESOPHAGOGASTRODUODENOSCOPY      HERNIA REPAIR      Resolved     [place] ESOPHAGOGASTRODUODENOSCOPY TRANSORAL DIAGNOSTIC N/A 11/30/2017    Procedure: ESOPHAGOGASTRODUODENOSCOPY (EGD);  Surgeon: Jazmyne Lynn DO;  Location: [facility];  Service: Gastroenterology    TONSILLECTOMY      WISDOM TOOTH EXTRACTION       Family History   Problem Relation Age of Onset    Hyperlipidemia Mother     Cancer Mother         Malignant Neoplasm     Heart attack Father     Lung cancer Maternal Grandmother     Lung cancer Maternal Grandfather     Heart attack Maternal Grandfather     No Known Problems Paternal Grandmother     No Known Problems Paternal Grandfather     Other Other         Cardiac Disorder    Stroke Other       reports that he has been smoking cigarettes. He started smoking about 48 years ago. He has a 24.2 pack-year smoking history. He uses smokeless tobacco. He reports that he does not currently use alcohol. He reports current drug use. Drug: Methamphetamines.  Current Outpatient Medications   Medication Instructions    albuterol (Ventolin HFA) 90 mcg/act inhaler 2 puffs, Inhalation, Every 4 hours PRN    amLODIPine (NORVASC) 10 mg, Oral, Daily    atorvastatin (LIPITOR) 20 mg, Oral, Daily    budesonide-formoterol (SYMBICORT) 160-4.5 mcg/act inhaler 2 puffs, Inhalation, 2 times  daily, Rinse mouth after use.    celecoxib (CELEBREX) 200 mg, Oral, 2 times daily    escitalopram (LEXAPRO) 5 mg, Oral, Daily    hydroCHLOROthiazide 12.5 mg, Oral, Daily    losartan (COZAAR) 100 mg, Oral, Daily    omeprazole (PRILOSEC) 40 mg, Oral, Daily before breakfast    prednisoLONE acetate (PRED FORTE) 1 % ophthalmic suspension APPLY TO THE LEFT EAR TWO TIMES A DAY IN COMBINATION WITH EARDROPS    Symbicort 160-4.5 MCG/ACT inhaler 2 puffs, Inhalation, 2 times daily, Rinse mouth after use.    tamsulosin (FLOMAX) 0.4 mg, Oral, Daily with dinner     Allergies   Allergen Reactions    Morphine Swelling      Current Outpatient Medications on File Prior to Visit   Medication Sig Dispense Refill    albuterol (Ventolin HFA) 90 mcg/act inhaler Inhale 2 puffs every 4 (four) hours as needed for wheezing or shortness of breath 18 g 2    amLODIPine (NORVASC) 10 mg tablet Take 1 tablet (10 mg total) by mouth daily 30 tablet 3    atorvastatin (LIPITOR) 20 mg tablet Take 1 tablet (20 mg total) by mouth daily 30 tablet 5    budesonide-formoterol (SYMBICORT) 160-4.5 mcg/act inhaler Inhale 2 puffs 2 (two) times a day Rinse mouth after use. 10.2 g 5    celecoxib (CeleBREX) 200 mg capsule Take 1 capsule (200 mg total) by mouth 2 (two) times a day 60 capsule 5    escitalopram (LEXAPRO) 5 mg tablet Take 1 tablet (5 mg total) by mouth daily 30 tablet 3    hydroCHLOROthiazide 12.5 mg tablet Take 1 tablet (12.5 mg total) by mouth daily 30 tablet 3    losartan (COZAAR) 100 MG tablet Take 1 tablet (100 mg total) by mouth daily 90 tablet 1    omeprazole (PriLOSEC) 40 MG capsule Take 1 capsule (40 mg total) by mouth daily before breakfast 30 capsule 5    Symbicort 160-4.5 MCG/ACT inhaler Inhale 2 puffs 2 (two) times a day Rinse mouth after use. 10.2 g 5    tamsulosin (FLOMAX) 0.4 mg Take 1 capsule (0.4 mg total) by mouth daily with dinner 30 capsule 0    prednisoLONE acetate (PRED FORTE) 1 % ophthalmic suspension APPLY TO THE LEFT EAR TWO  "TIMES A DAY IN COMBINATION WITH EARDROPS (Patient not taking: Reported on 5/12/2025)       Current Facility-Administered Medications on File Prior to Visit   Medication Dose Route Frequency Provider Last Rate Last Admin    fentaNYL injection   Intravenous PRN Tamir Chaney CRNA        midazolam (VERSED) injection   Intravenous PRN Tamir Chaney CRNA          Social History     Tobacco Use    Smoking status: Every Day     Current packs/day: 0.50     Average packs/day: 0.5 packs/day for 48.4 years (24.2 ttl pk-yrs)     Types: Cigarettes     Start date: 1977    Smokeless tobacco: Current    Tobacco comments:     since 12 yrs old, Smokes less than 1 pack per day  per [employer]    Vaping Use    Vaping status: Never Used   Substance and Sexual Activity    Alcohol use: Not Currently    Drug use: Yes     Types: Methamphetamines     Comment: last use 5 days ago    Sexual activity: Yes        Objective   /80 (BP Location: Left arm, Patient Position: Sitting, Cuff Size: Standard)   Pulse 81   Temp 97.5 °F (36.4 °C) (Tympanic)   Ht 6' 1\" (1.854 m)   Wt 95.7 kg (211 lb)   SpO2 97%   BMI 27.84 kg/m²      Physical Exam  Vitals reviewed.   Constitutional:       Appearance: Normal appearance.   HENT:      Head: Normocephalic and atraumatic.      Mouth/Throat:      Mouth: Mucous membranes are moist.   Eyes:      Pupils: Pupils are equal, round, and reactive to light.   Cardiovascular:      Rate and Rhythm: Normal rate and regular rhythm.   Pulmonary:      Effort: Pulmonary effort is normal.      Breath sounds: Normal breath sounds.   Abdominal:      General: Abdomen is flat. Bowel sounds are normal.      Palpations: Abdomen is soft.      Hernia: A hernia is present.          Comments: Small incarcerated umbilical hernia.  There is a full midline exploratory laparotomy incision.  There are multiple areas of weakness throughout the incision.   Genitourinary:     Penis: Normal.    Musculoskeletal:         General: " Normal range of motion.      Cervical back: Normal range of motion.   Skin:     General: Skin is warm.      Comments: Area of folliculitis over the left wrist   Neurological:      General: No focal deficit present.      Mental Status: He is alert.   Psychiatric:         Mood and Affect: Mood normal.

## 2025-05-12 NOTE — ED PROVIDER NOTES
Time reflects when diagnosis was documented in both MDM as applicable and the Disposition within this note       Time User Action Codes Description Comment    5/12/2025  6:12 PM Aram Ferrera [R07.9] Chest pain, unspecified type     5/12/2025  6:12 PM Aram Ferrera [I47.10] SVT (supraventricular tachycardia) ([facility])           ED Disposition       ED Disposition   AMA    Condition   --    Date/Time   Mon May 12, 2025  6:12 PM    Comment   Date: 5/12/2025  Patient: Daniel Wagner  Admitted: 5/12/2025  2:58 PM  Attending Provider: Aram Ferrera MD    Daniel Wagner or his authorized caregiver has made the decision for the patient to leave the emergency department against the advice of  his attending physician. He or his authorized caregiver has been informed and understands the inherent risks, including death, permanent disability, heart failure.  He or his authorized caregiver has decided to accept the responsibility for this deci corrie. Daniel Wagner and all necessary parties have been advised that he may return for further evaluation or treatment. His condition at time of discharge was stable.  Daniel Wagner had current vital signs as follows:  /59   Pulse 80   Temp 9 8.3 °F (36.8 °C) (Oral)   Resp 18                Assessment & Plan       Medical Decision Making  60-year-old male, presents from home by EMS for episode of chest pain, shortness of breath, and lightheadedness.  Differential diagnosis includes arrhythmia, ACS, dehydration among other diagnoses.  Patient reports history of elevated heart rate, has not been taking his medication for several days.  EMS reports patient in SVT, converted to sinus rhythm spontaneously.  Patient noted to be in normal sinus rhythm on cardiac monitor.  EKG and labs ordered, will continue to monitor in ED and reevaluate.    I have reviewed test results and diagnosis with patient.  Recommend keeping in hospital for further monitoring and observation, patient  declines, signed out AGAINST MEDICAL ADVICE.  Follow-up plan reviewed.  Precautions for acute return for re-evaluation are reviewed.  Opportunity to ask questions was provided.  Patient verbalizes understanding.      Amount and/or Complexity of Data Reviewed  Independent Historian: EMS  External Data Reviewed: ECG.     Details: Previous EKG reviewed compared to current  Labs: ordered. Decision-making details documented in ED Course.  ECG/medicine tests: ordered and independent interpretation performed. Decision-making details documented in ED Course.        ED Course as of 05/12/25 1824   Mon May 12, 2025   1645 Patient comfortable, normal sinus rhythm on cardiac monitor, will continue to monitor and obtain 2-hour repeat troponin.   1822 Patient noted to have elevation in 2-hour troponin.  Patient has been asymptomatic since arrival to ED, normal sinus rhythm on cardiac monitor.  Recommend keeping hospital overnight for further monitoring due to elevated troponin.  Elevated troponin likely due to SVT but patient does have risk factors for acute cardiac disease.  Patient states he cannot stay in hospital, aware of risks including possible myocardial infarction, death, permanent disability.  Patient states he will restart his medications which she does have available, and follow-up with his doctors.  Ambulatory referral for cardiology follow-up order placed.  Patient aware that he may return to emergency department at any time.       Medications - No data to display    ED Risk Strat Scores   HEART Risk Score      Flowsheet Row Most Recent Value   Heart Score Risk Calculator    History 1 Filed at: 05/12/2025 1813   ECG 0 Filed at: 05/12/2025 1813   Age 1 Filed at: 05/12/2025 1813   Risk Factors 1 Filed at: 05/12/2025 1813   Troponin 1 Filed at: 05/12/2025 1813   HEART Score 4 Filed at: 05/12/2025 1813          HEART Risk Score      Flowsheet Row Most Recent Value   Heart Score Risk Calculator    History 1 Filed at:  05/12/2025 1813   ECG 0 Filed at: 05/12/2025 1813   Age 1 Filed at: 05/12/2025 1813   Risk Factors 1 Filed at: 05/12/2025 1813   Troponin 1 Filed at: 05/12/2025 1813   HEART Score 4 Filed at: 05/12/2025 1813                      No data recorded                            History of Present Illness       Chief Complaint   Patient presents with    Chest Pain     Pt arrives via EMS with sudden onset CP and SOB, on EMS arrival pt in SVT with rate in 170s, pt spontaneously converted en route. Currently denies CP/SOB. Pt states recent changes to meds 2 weeks ago, states has not been taking all medications the past 3 days       Past Medical History:   Diagnosis Date    Anxiety     Asthma     Peterson esophagus     Peterson's esophagus     Bowel obstruction (HCC)     Cervical radiculopathy     Chest pain     Last assessed - 7/31/17    Chronic GERD     Last assessed - 3/11/16    COPD (chronic obstructive pulmonary disease) (HCC)     Foot ulcer (HCC)     Hep C w/o coma, chronic (HCC)     treated    Hepatic disease     Cirrhosis    Hernia     History of neck problems     Hypercholesterolemia     Hypertension     Kidney disease     Kidney stones     Paresthesia of upper extremity     SOB (shortness of breath) on exertion     Wears glasses       Past Surgical History:   Procedure Laterality Date    APPENDECTOMY      ESOPHAGOGASTRODUODENOSCOPY      HERNIA REPAIR      Resolved     [place] ESOPHAGOGASTRODUODENOSCOPY TRANSORAL DIAGNOSTIC N/A 11/30/2017    Procedure: ESOPHAGOGASTRODUODENOSCOPY (EGD);  Surgeon: Jazmyne Lynn DO;  Location: [facility];  Service: Gastroenterology    TONSILLECTOMY      WISDOM TOOTH EXTRACTION        Family History   Problem Relation Age of Onset    Hyperlipidemia Mother     Cancer Mother         Malignant Neoplasm     Heart attack Father     Lung cancer Maternal Grandmother     Lung cancer Maternal Grandfather     Heart attack Maternal Grandfather     No Known Problems Paternal Grandmother     No Known  Problems Paternal Grandfather     Other Other         Cardiac Disorder    Stroke Other       Social History     Tobacco Use    Smoking status: Every Day     Current packs/day: 0.50     Average packs/day: 0.5 packs/day for 48.4 years (24.2 ttl pk-yrs)     Types: Cigarettes     Start date: 1977    Smokeless tobacco: Current    Tobacco comments:     since 12 yrs old, Smokes less than 1 pack per day  per [employer]    Vaping Use    Vaping status: Never Used   Substance Use Topics    Alcohol use: Not Currently    Drug use: Yes     Types: Methamphetamines     Comment: last use 5 days ago      E-Cigarette/Vaping    E-Cigarette Use Never User       E-Cigarette/Vaping Substances    Nicotine No     THC No     CBD No     Flavoring No     Other No     Unknown No       I have reviewed and agree with the history as documented.     60-year-old male, presents from home by EMS for sudden onset of chest pain, shortness of breath, and lightheadedness.  Patient states he was doing work outside his house, developed sudden onset of symptoms.  Patient reports he has a history of rapid heart rates, has not been taking his medication for the past few days.  EMS reports patient in SVT, converted to sinus rhythm spontaneously.  Patient has no current complaints.  States he was feeling well prior to episode.      History provided by:  Patient and EMS personnel   used: No    Chest Pain  Associated symptoms: palpitations and shortness of breath    Associated symptoms: no fever        Review of Systems   Constitutional:  Negative for fever.   Respiratory:  Positive for shortness of breath.    Cardiovascular:  Positive for chest pain and palpitations.   Neurological:  Positive for light-headedness.           Objective       ED Triage Vitals [05/12/25 1507]   Temperature Pulse Blood Pressure Respirations SpO2 Patient Position - Orthostatic VS   98.3 °F (36.8 °C) 87 135/65 18 98 % --      Temp Source Heart Rate Source BP Location  FiO2 (%) Pain Score    Oral Monitor Left arm -- --      Vitals      Date and Time Temp Pulse SpO2 Resp BP Pain Score FACES Pain Rating User   05/12/25 1630 -- 80 96 % -- 110/59 -- --    05/12/25 1600 -- 80 93 % -- 125/66 -- --    05/12/25 1507 98.3 °F (36.8 °C) 87 98 % 18 135/65 -- --             Physical Exam  Vitals and nursing note reviewed.   Constitutional:       General: He is not in acute distress.  HENT:      Head: Normocephalic and atraumatic.   Cardiovascular:      Rate and Rhythm: Normal rate and regular rhythm.   Pulmonary:      Effort: Pulmonary effort is normal.      Breath sounds: Normal breath sounds.   Abdominal:      Comments: Soft, nondistended  Ventral hernia, soft, easily reducible   Musculoskeletal:         General: Normal range of motion.   Skin:     General: Skin is warm and dry.   Neurological:      General: No focal deficit present.      Mental Status: He is alert and oriented to person, place, and time.         Results Reviewed       Procedure Component Value Units Date/Time    HS Troponin I 2hr [[id number]]  (Normal) Collected: 05/12/25 1728    Lab Status: Final result Specimen: Blood from Arm, Left Updated: 05/12/25 1803     hs TnI 2hr 23 ng/L      Delta 2hr hsTnI 15 ng/L     HS Troponin 0hr (reflex protocol) [[id number]]  (Normal) Collected: 05/12/25 1531    Lab Status: Final result Specimen: Blood from Arm, Left Updated: 05/12/25 1604     hs TnI 0hr 8 ng/L     HS Troponin I 4hr [[id number]]     Lab Status: No result Specimen: Blood     Comprehensive metabolic panel [[id number]]  (Abnormal) Collected: 05/12/25 1531    Lab Status: Final result Specimen: Blood from Arm, Left Updated: 05/12/25 1556     Sodium 143 mmol/L      Potassium 3.3 mmol/L      Chloride 111 mmol/L      CO2 24 mmol/L      ANION GAP 8 mmol/L      BUN 15 mg/dL      Creatinine 1.19 mg/dL      Glucose 119 mg/dL      Calcium 9.1 mg/dL      AST 19 U/L      ALT 18 U/L      Alkaline Phosphatase 58 U/L      Total Protein 6.9  g/dL      Albumin 4.3 g/dL      Total Bilirubin 0.58 mg/dL      eGFR 65 ml/min/1.73sq m     Narrative:      National Kid[employer] Disease Foundation guidelines for Chronic Kidney Disease (CKD):     Stage 1 with normal or high GFR (GFR > 90 mL/min/1.73 square meters)    Stage 2 Mild CKD (GFR = 60-89 mL/min/1.73 square meters)    Stage 3A Moderate CKD (GFR = 45-59 mL/min/1.73 square meters)    Stage 3B Moderate CKD (GFR = 30-44 mL/min/1.73 square meters)    Stage 4 Severe CKD (GFR = 15-29 mL/min/1.73 square meters)    Stage 5 End Stage CKD (GFR <15 mL/min/1.73 square meters)  Note: GFR calculation is accurate only with a steady state creatinine    CBC and differential [[id number]]  (Abnormal) Collected: 05/12/25 1531    Lab Status: Final result Specimen: Blood from Arm, Left Updated: 05/12/25 1541     WBC 4.28 Thousand/uL      RBC 4.37 Million/uL      Hemoglobin 13.5 g/dL      Hematocrit 41.2 %      MCV 94 fL      MCH 30.9 pg      MCHC 32.8 g/dL      RDW 13.7 %      MPV 9.6 fL      Platelets 153 Thousands/uL      nRBC 0 /100 WBCs      Segmented % 61 %      Immature Grans % 1 %      Lymphocytes % 28 %      Monocytes % 8 %      Eosinophils Relative 1 %      Basophils Relative 1 %      Absolute Neutrophils 2.62 Thousands/µL      Absolute Immature Grans 0.02 Thousand/uL      Absolute Lymphocytes 1.20 Thousands/µL      Absolute Monocytes 0.35 Thousand/µL      Eosinophils Absolute 0.05 Thousand/µL      Basophils Absolute 0.04 Thousands/µL             No orders to display       ECG 12 Lead Documentation Only    Date/Time: 5/12/2025 4:02 PM    Performed by: Aram Ferrera MD  Authorized by: Aram Ferrera MD    ECG reviewed by me, the ED Provider: yes    Patient location:  ED  Previous ECG:     Previous ECG:  Compared to current    Similarity:  No change  Interpretation:     Interpretation: normal    Rate:     ECG rate:  61    ECG rate assessment: normal    Rhythm:     Rhythm: sinus rhythm    Ectopy:     Ectopy: none    QRS:      QRS axis:  Normal    QRS intervals:  Normal  Conduction:     Conduction: normal    ST segments:     ST segments:  Normal      ED Medication and Procedure Management   Prior to Admission Medications   Prescriptions Last Dose Informant Patient Reported? Taking?   Symbicort 160-4.5 MCG/ACT inhaler   No No   Sig: Inhale 2 puffs 2 (two) times a day Rinse mouth after use.   albuterol (Ventolin HFA) 90 mcg/act inhaler   No No   Sig: Inhale 2 puffs every 4 (four) hours as needed for wheezing or shortness of breath   amLODIPine (NORVASC) 10 mg tablet   No No   Sig: Take 1 tablet (10 mg total) by mouth daily   atorvastatin (LIPITOR) 20 mg tablet   No No   Sig: Take 1 tablet (20 mg total) by mouth daily   budesonide-formoterol (SYMBICORT) 160-4.5 mcg/act inhaler   No No   Sig: Inhale 2 puffs 2 (two) times a day Rinse mouth after use.   celecoxib (CeleBREX) 200 mg capsule   No No   Sig: Take 1 capsule (200 mg total) by mouth 2 (two) times a day   doxycycline hyclate (VIBRAMYCIN) 100 mg capsule   No No   Sig: Take 1 capsule (100 mg total) by mouth every 12 (twelve) hours for 7 days   escitalopram (LEXAPRO) 5 mg tablet   No No   Sig: Take 1 tablet (5 mg total) by mouth daily   hydroCHLOROthiazide 12.5 mg tablet   No No   Sig: Take 1 tablet (12.5 mg total) by mouth daily   losartan (COZAAR) 100 MG tablet   No No   Sig: Take 1 tablet (100 mg total) by mouth daily   omeprazole (PriLOSEC) 40 MG capsule   No No   Sig: Take 1 capsule (40 mg total) by mouth daily before breakfast   prednisoLONE acetate (PRED FORTE) 1 % ophthalmic suspension   Yes No   Sig: APPLY TO THE LEFT EAR TWO TIMES A DAY IN COMBINATION WITH EARDROPS   Patient not taking: Reported on 5/12/2025   tamsulosin (FLOMAX) 0.4 mg  Self No No   Sig: Take 1 capsule (0.4 mg total) by mouth daily with dinner      Facility-Administered Medications: None     Patient's Medications   Discharge Prescriptions    No medications on file       ED SEPSIS DOCUMENTATION   Time reflects  when diagnosis was documented in both MDM as applicable and the Disposition within this note       Time User Action Codes Description Comment    5/12/2025  6:12 PM Aram Ferrera [R07.9] Chest pain, unspecified type     5/12/2025  6:12 PM Aram Ferrera [I47.10] SVT (supraventricular tachycardia) (HCC)                  Aram Ferrera MD  05/12/25 1824

## 2025-05-14 LAB
ATRIAL RATE: 88 BPM
P AXIS: 56 DEGREES
PR INTERVAL: 142 MS
QRS AXIS: 75 DEGREES
QRSD INTERVAL: 92 MS
QT INTERVAL: 370 MS
QTC INTERVAL: 447 MS
T WAVE AXIS: 69 DEGREES
VENTRICULAR RATE: 88 BPM

## 2025-05-14 PROCEDURE — 93010 ELECTROCARDIOGRAM REPORT: CPT | Performed by: INTERNAL MEDICINE

## 2025-05-15 ENCOUNTER — OFFICE VISIT (OUTPATIENT)
Dept: OTOLARYNGOLOGY | Facility: CLINIC | Age: 60
End: 2025-05-15

## 2025-05-15 VITALS — WEIGHT: 211 LBS | BODY MASS INDEX: 27.96 KG/M2 | HEIGHT: 73 IN

## 2025-05-15 DIAGNOSIS — J34.89 NASAL SEPTAL PERFORATION: ICD-10-CM

## 2025-05-15 DIAGNOSIS — L29.9 EAR ITCHING: Primary | ICD-10-CM

## 2025-05-15 DIAGNOSIS — J30.9 ALLERGIC RHINITIS, UNSPECIFIED SEASONALITY, UNSPECIFIED TRIGGER: ICD-10-CM

## 2025-05-15 DIAGNOSIS — T16.2XXA ACUTE FOREIGN BODY OF LEFT EAR CANAL, INITIAL ENCOUNTER: ICD-10-CM

## 2025-05-15 RX ORDER — PREDNISOLONE ACETATE 10 MG/ML
SUSPENSION/ DROPS OPHTHALMIC
Qty: 5 ML | Refills: 1 | Status: SHIPPED | OUTPATIENT
Start: 2025-05-15

## 2025-05-15 NOTE — PROGRESS NOTES
Consultation - Otolaryngology - Head and Neck Surgery  Daniel Wagner 60 y.o. male MRN: [id number]  Encounter: [id number]        Assessment/Plan:  1. Ear itching  prednisoLONE acetate (PRED FORTE) 1 % ophthalmic suspension      2. Acute foreign body of left ear canal, initial encounter        3. Nasal septal perforation        4. Allergic rhinitis, unspecified seasonality, unspecified trigger            Foreign body (cotton) was removed from the left auditory canal and patient's ear blockage was resolved following procedure. Tympanic membranes were intact bilaterally so dry ear precautions are no longer required. Prescription for Pred Forte drops were provided to use for ear itching.    He has chronic septal perforation on examination. He will continue with use of OTC oral antihistamine as needed for allergy symptoms.    History of Present Illness   Physician Requesting Consult: Richard Diaz MD   Reason for Consult / Principal Problem: Left Ear Blockage  HPI: Daniel Wagner is a 60 y.o. year old male who presents for re-evaluation. He had left PET removed from the left ear at his last visit. He used drops and cotton in the left ear, and it has recently felt blocked. He has chronic history of ear itching. He has chronic septal perforation. He takes OTC oral antihistamine as needed for allergies.    Review of systems:  10 Point ROS was performed and negative except as above or otherwise noted in the medical record.    Historical Information   Past Medical History:   Diagnosis Date    Anxiety     Asthma     Peterson esophagus     Peterson's esophagus     Bowel obstruction (HCC)     Cervical radiculopathy     Chest pain     Last assessed - 7/31/17    Chronic GERD     Last assessed - 3/11/16    COPD (chronic obstructive pulmonary disease) (HCC)     Foot ulcer (HCC)     Hep C w/o coma, chronic (HCC)     treated    Hepatic disease     Cirrhosis    Hernia     History of neck problems     Hypercholesterolemia     Hypertension      Kidney disease     Kidney stones     Paresthesia of upper extremity     SOB (shortness of breath) on exertion     Wears glasses      Past Surgical History:   Procedure Laterality Date    APPENDECTOMY      ESOPHAGOGASTRODUODENOSCOPY      HERNIA REPAIR      Resolved     [place] ESOPHAGOGASTRODUODENOSCOPY TRANSORAL DIAGNOSTIC N/A 11/30/2017    Procedure: ESOPHAGOGASTRODUODENOSCOPY (EGD);  Surgeon: Jazmyne Lynn DO;  Location: [facility];  Service: Gastroenterology    TONSILLECTOMY      WISDOM TOOTH EXTRACTION       Social History   Social History     Substance and Sexual Activity   Alcohol Use Not Currently     Social History     Substance and Sexual Activity   Drug Use Yes    Types: Methamphetamines    Comment: last use 5 days ago     Tobacco Use History[1]    Current Outpatient Medications on File Prior to Visit   Medication Sig    albuterol (Ventolin HFA) 90 mcg/act inhaler Inhale 2 puffs every 4 (four) hours as needed for wheezing or shortness of breath    amLODIPine (NORVASC) 10 mg tablet Take 1 tablet (10 mg total) by mouth daily    atorvastatin (LIPITOR) 20 mg tablet Take 1 tablet (20 mg total) by mouth daily    budesonide-formoterol (SYMBICORT) 160-4.5 mcg/act inhaler Inhale 2 puffs 2 (two) times a day Rinse mouth after use.    celecoxib (CeleBREX) 200 mg capsule Take 1 capsule (200 mg total) by mouth 2 (two) times a day    doxycycline hyclate (VIBRAMYCIN) 100 mg capsule Take 1 capsule (100 mg total) by mouth every 12 (twelve) hours for 7 days    escitalopram (LEXAPRO) 5 mg tablet Take 1 tablet (5 mg total) by mouth daily    hydroCHLOROthiazide 12.5 mg tablet Take 1 tablet (12.5 mg total) by mouth daily    losartan (COZAAR) 100 MG tablet Take 1 tablet (100 mg total) by mouth daily    omeprazole (PriLOSEC) 40 MG capsule Take 1 capsule (40 mg total) by mouth daily before breakfast    Symbicort 160-4.5 MCG/ACT inhaler Inhale 2 puffs 2 (two) times a day Rinse mouth after use.    tamsulosin (FLOMAX) 0.4 mg Take 1  capsule (0.4 mg total) by mouth daily with dinner    [DISCONTINUED] prednisoLONE acetate (PRED FORTE) 1 % ophthalmic suspension APPLY TO THE LEFT EAR TWO TIMES A DAY IN COMBINATION WITH EARDROPS (Patient not taking: Reported on 5/1/2025)     Current Facility-Administered Medications on File Prior to Visit   Medication    fentaNYL injection    midazolam (VERSED) injection       Allergies[2]    There were no vitals filed for this visit.    Physical Exam   Constitutional: Oriented to person, place, and time. Well-developed and well-nourished, no apparent distress, non-toxic appearance. Cooperative, able to hear and answer questions without difficulty.    Voice: Voice quality is normal.  Head: Normocephalic, atraumatic. No scars, masses or lesions.  Face: Symmetric, no edema. Facial nerve function is symmetric/fully intact bilaterally.  Ears: External ears are normal bilaterally. Auditory canals are dry bilaterally. Left auditory canal is obstructed with two large pieces of cotton. Tympanic membranes are intact bilaterally  Nose: External nose is normal. Septum is perforated. Turbinates are normal.  Oral cavity: Dentition intact. Mucosa moist, lips without lesions or masses. Tongue mobile, floor of mouth soft and flat. Hard palate without masses or lesions.   Oropharynx: Uvula is midline. Soft palate without masses or lesions. Tonsils unremarkable. Posterior pharynx is clear without masses or lesions present.  Neck: Trachea is midline. No masses or lesions present. No palpable adenopathy. Thyroid is without tenderness or palpable nodules. Parotid and submandibular glands are normal.    Procedure: Foreign Body Removal    Indication: Foreign Body    Site: Left Ear    Procedural Analgesia: none required     Procedure: Utilizing an otoscope, two large pieces of cotton were removed from the left auditory canal by use of alligator forceps.    The procedure was performed with success. Re-examination of the ears afterwards  showed absence of cerumen on both sides, intact tympanic membranes.     Tolerance of the procedure by the patient: satisfactory    Complications: none       [1]   Social History  Tobacco Use   Smoking Status Every Day    Current packs/day: 0.50    Average packs/day: 0.5 packs/day for 48.4 years (24.2 ttl pk-yrs)    Types: Cigarettes    Start date: 1977   Smokeless Tobacco Current   Tobacco Comments    since 12 yrs old, Smokes less than 1 pack per day  per [employer]    [2]   Allergies  Allergen Reactions    Morphine Swelling

## 2025-05-22 ENCOUNTER — CONSULT (OUTPATIENT)
Dept: CARDIOLOGY CLINIC | Facility: CLINIC | Age: 60
End: 2025-05-22
Attending: EMERGENCY MEDICINE
Payer: MEDICARE

## 2025-05-22 VITALS
BODY MASS INDEX: 28.1 KG/M2 | HEART RATE: 72 BPM | SYSTOLIC BLOOD PRESSURE: 142 MMHG | DIASTOLIC BLOOD PRESSURE: 80 MMHG | WEIGHT: 213 LBS

## 2025-05-22 DIAGNOSIS — E78.5 DYSLIPIDEMIA: ICD-10-CM

## 2025-05-22 DIAGNOSIS — I10 PRIMARY HYPERTENSION: Primary | ICD-10-CM

## 2025-05-22 DIAGNOSIS — F41.9 ANXIETY: ICD-10-CM

## 2025-05-22 DIAGNOSIS — J43.9 PULMONARY EMPHYSEMA, UNSPECIFIED EMPHYSEMA TYPE (HCC): ICD-10-CM

## 2025-05-22 DIAGNOSIS — I47.10 SVT (SUPRAVENTRICULAR TACHYCARDIA) (HCC): ICD-10-CM

## 2025-05-22 DIAGNOSIS — R07.2 PRECORDIAL PAIN: ICD-10-CM

## 2025-05-22 PROCEDURE — 99244 OFF/OP CNSLTJ NEW/EST MOD 40: CPT

## 2025-05-22 RX ORDER — DILTIAZEM HYDROCHLORIDE 240 MG/1
240 CAPSULE, EXTENDED RELEASE ORAL DAILY
Qty: 90 CAPSULE | Refills: 3 | Status: SHIPPED | OUTPATIENT
Start: 2025-05-22

## 2025-05-22 NOTE — PROGRESS NOTES
Cardiology Consultation   MD Eddie Warren MD, FACC  Aram Salazar DO, ALONSO SANON FACP, FASNC Ather Mansoor, MD Rujul Patel, DO, [facility]  Shahzad Azevedo DO, [facility] FASNC  -------------------------------------------------------------------  [facility]  [address] [place] [address]  Phone: [phone]  Fax: [phone]  05/22/25  Daniel Wagner  YOB: 1965   MRN: [id number]      Referring Physician: Aram Ferrera MD  [address] [place] [id number]     HPI:  I am seeing this patient in cardiology consultation for: Chest pain, palpitations, SVT   Daniel Wagner is a 60 y.o. male with:   HTN  COPD  Tobacco use - 1 PPD  GERD  Umbilical hernia  HLD  Cirrhosis    Tobacco: 1 PPD  Alcohol: none  Drugs: meth occasionally    Family History: Multiple family members with coronary artery disease    He presents for evaluation with cardiology.  He was in the emergency department last week with chest pain with symptoms that occurred rather suddenly.  When EMS picked him up he was in a SVT, this is described in their notes but there is no strips available scanned into the chart.  He was spoken to about this with the ER physician and they were planning to give adenosine however when talking about this is when he converted back to normal.  His troponin was negative but did trend from 8-23 with a delta of 15.  He ultimately signed out AGAINST MEDICAL ADVICE but presents today for follow-up of the symptoms    He states when he had the chest pain this was in the center of his chest and he felt like he was going to die.  States that this was the most extreme pain he has ever had in his chest.    Review of Systems   Constitutional:  Negative for chills and fever.   HENT:  Negative for facial swelling and sore throat.    Eyes:  Negative for visual disturbance.   Respiratory:  Negative for cough, chest tightness, shortness of breath and wheezing.     Cardiovascular:  Negative for chest pain, palpitations and leg swelling.   Gastrointestinal:  Negative for abdominal pain, blood in stool, constipation, diarrhea, nausea and vomiting.   Endocrine: Negative for cold intolerance and heat intolerance.   Genitourinary:  Negative for decreased urine volume, difficulty urinating, dysuria and hematuria.   Musculoskeletal:  Negative for arthralgias, back pain and myalgias.   Skin:  Negative for rash.   Neurological:  Negative for dizziness, syncope, weakness and numbness.   Psychiatric/Behavioral:  Negative for agitation, behavioral problems and confusion. The patient is not nervous/anxious.         OBJECTIVE  Vitals:    05/22/25 0957   BP: 142/80   Pulse: 72       Physical Exam   Constitutional: awake, alert and oriented, in no acute distress, no obvious deformities  Head: Normocephalic, without obvious abnormality, atraumatic  Eyes: conjunctivae clear and moist. Sclera anicteric. No xanthelasmas. Pupils equal bilaterally. Extraocular motions are full.  Ear nose mouth and throat: ears are symmetrical bilaterally, hearing appears to be equal bilaterally, no nasal discharge or epistaxis, oropharynx is clear with moist mucous membranes  Neck: Trachea is midline, neck is supple, no thyromegaly or significant lymphadenopathy, there is full range of motion.  Lungs: clear to auscultation bilaterally, no wheezes, no rales, no rhonchi, no accessory muscle use, breathing is nonlabored  Heart: regular rate and rhythm, S1, S2 normal, no murmur, no click, no rub and no gallop, no lower extremity edema  Abdomen: soft, non-tender; bowel sounds normal; no masses, no organomegaly  Psychiatric: Patient is oriented to time, place, person, mood/affect is negative for depression, anxiety, agitation, appears to have appropriate insight  Skin: Skin is warm, dry, intact. No obvious rashes or lesions on exposed extremities. Nail beds are pink with no cyanosis or clubbing.    EKG:  No results  found for this visit on 05/22/25.     The 10-year ASCVD risk score (Stephanie MAE, et al., 2019) is: 18.4%    Values used to calculate the score:      Age: 60 years      Clincally relevant sex: Male      Is Non- : No      Diabetic: No      Tobacco smoker: Yes      Systolic Blood Pressure: 142 mmHg      Is BP treated: Yes      HDL Cholesterol: 47 mg/dL      Total Cholesterol: 175 mg/dL    IMPRESSION:  Chest pain  Supraventricular tachycardia suspect AV garrett reentry    HTN  COPD  Tobacco use - 1 PPD  GERD  Umbilical hernia  HLD  Cirrhosis    DISCUSSION/RECOMMENDATIONS:  He presents for evaluation of cardiology of chest pain and supraventricular tachycardia  He states when he had his episode when he went to the emergency department, he was in a SVT, troponins were negative, but had severe chest pain  He is a smoker, hypertensive with COPD and dyslipidemia, definitely has risk factors to develop underlying coronary disease.  I reviewed his CT of the abdomen and pelvis personally and he has some evidence of aorta and iliac artery calcifications  Will investigate further regarding the chest pain with a nuclear stress test.  He has many orthopedic issues and severe neck pain, he states he will not be able to walk on the treadmill.  Will do pharmacologic nuclear stress  Will also check echocardiogram given his history of hypertension and now with arrhythmia to rule out underlying structural heart disease  Stop amlodipine  Replaced amlodipine with diltiazem 240 mg  Continue with Lipitor 20 mg HCTZ 12.5 mg and losartan 100 mg    Shahzad Azevedo DO, [facility], CARISSA GUPTA  --------------------------------------------------------------------------------  TREADMILL STRESS  No results found for this or any previous visit.     ----------------------------------------------------------------------------------------------  NUCLEAR STRESS TEST: No results found for this or any previous visit.    No results found for  this or any previous visit.      --------------------------------------------------------------------------------  CATH:  No results found for this or any previous visit.    --------------------------------------------------------------------------------  ECHO:   No results found for this or any previous visit.    No results found for this or any previous visit.    --------------------------------------------------------------------------------  HOLTER  No results found for this or any previous visit.    --------------------------------------------------------------------------------  CAROTIDS  No results found for this or any previous visit.       Diagnoses and all orders for this visit:    Primary hypertension    Pulmonary emphysema, unspecified emphysema type (HCC)    Anxiety    Dyslipidemia    SVT (supraventricular tachycardia) ([facility])  -     diltiazem (TIAZAC) 240 MG 24 hr capsule; Take 1 capsule (240 mg total) by mouth daily    Other orders  -     Ambulatory Referral to Cardiology       ======================================================    Past Medical History[1]  Past Surgical History[2]      Medications  Current Medications[3]     Allergies[4]    Social History     Socioeconomic History    Marital status: Single     Spouse name: Not on file    Number of children: 4    Years of education: Not on file    Highest education level: Not on file   Occupational History    Occupation: Permanent Disability      Comment: [employer]-not looking for work    Tobacco Use    Smoking status: Every Day     Current packs/day: 0.50     Average packs/day: 0.5 packs/day for 48.4 years (24.2 ttl pk-yrs)     Types: Cigarettes     Start date: 1977    Smokeless tobacco: Current    Tobacco comments:     since 12 yrs old, Smokes less than 1 pack per day  per [employer]    Vaping Use    Vaping status: Never Used   Substance and Sexual Activity    Alcohol use: Not Currently    Drug use: Yes     Types: Methamphetamines     Comment: last use 5  "days ago    Sexual activity: Yes   Other Topics Concern    Not on file   Social History Narrative    On permanent disability    Unemployed, not looking for work          Social Drivers of Health     Financial Resource Strain: Not on file   Food Insecurity: Not on file   Transportation Needs: Not on file   Physical Activity: Not on file   Stress: Not on file   Social Connections: Not on file   Intimate Partner Violence: Not on file   Housing Stability: Not on file        Family History[5]    Lab Results   Component Value Date    WBC 4.28 (L) 05/12/2025    HGB 13.5 05/12/2025    HCT 41.2 05/12/2025    MCV 94 05/12/2025     05/12/2025      Lab Results   Component Value Date    SODIUM 143 05/12/2025    K 3.3 (L) 05/12/2025     (H) 05/12/2025    CO2 24 05/12/2025    BUN 15 05/12/2025    CREATININE 1.19 05/12/2025    GLUC 119 05/12/2025    CALCIUM 9.1 05/12/2025      Lab Results   Component Value Date    HGBA1C 5.5 05/26/2015      Lab Results   Component Value Date    CHOL 233 03/26/2015    CHOL 181 01/07/2014     Lab Results   Component Value Date    HDL 47 11/25/2024    HDL 45 11/14/2016    HDL 44 03/26/2015     Lab Results   Component Value Date    LDLCALC 112 (H) 11/25/2024    LDLCALC 116 (H) 11/14/2016    LDLCALC 162 (H) 03/26/2015     Lab Results   Component Value Date    TRIG 78 11/25/2024    TRIG 93 11/14/2016    TRIG 134 03/26/2015     No results found for: \"CHOLHDL\"   Lab Results   Component Value Date    INR 1.08 11/25/2024    INR 1.01 06/27/2023    INR 1.07 03/22/2021    PROTIME 14.4 11/25/2024    PROTIME 13.5 06/27/2023    PROTIME 14.0 03/22/2021          Patient Active Problem List    Diagnosis Date Noted    Anxiety 05/01/2025    Hepatitis C antibody detected 05/01/2025    Tobacco use disorder 03/27/2025    Encounter for annual physical exam 03/27/2025    Ear discharge of left ear 03/05/2025    IFG (impaired fasting glucose) 03/05/2025    Thrombocytopenia (HCC) 09/19/2024    COPD (chronic " "obstructive pulmonary disease) (HCC) 09/19/2024    Ventral hernia without obstruction or gangrene 09/19/2024    Neck pain 09/19/2024    Paraspinal muscle spasm 01/09/2019    Hypertension 01/09/2019    Cervical spondylosis 10/18/2016    Cervical radiculopathy 08/04/2016    Cervical stenosis of spinal canal 08/04/2016    Paresthesia of upper extremity 07/18/2016    Panic disorder 02/02/2016    Cirrhosis of liver (HCC) 12/03/2015    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 Occasional wrong word or \"sound a like\" substitutions may have occurred due to the inherent limitations of voice recognition software. Read the chart carefully and recognize, using context, where substitutions have occurred.    Shahzad Azevedo DO, ALONSO SANON FASNC  5/22/2025 10:35 AM               [1]   Past Medical History:  Diagnosis Date    Anxiety     Asthma     Peterson esophagus     Peterson's esophagus     Bowel obstruction (HCC)     Cervical radiculopathy     Chest pain     Last assessed - 7/31/17    Chronic GERD     Last assessed - 3/11/16    COPD (chronic obstructive pulmonary disease) (HCC)     Foot ulcer (HCC)     Hep C w/o coma, chronic (HCC)     treated    Hepatic disease     Cirrhosis    Hernia     History of neck problems     Hypercholesterolemia     Hypertension     Kidney disease     Kidney stones     Paresthesia of upper extremity     SOB (shortness of breath) on exertion     Wears glasses    [2]   Past Surgical History:  Procedure Laterality Date    APPENDECTOMY      ESOPHAGOGASTRODUODENOSCOPY      HERNIA REPAIR      Resolved     VA ESOPHAGOGASTRODUODENOSCOPY TRANSORAL DIAGNOSTIC N/A 11/30/2017    Procedure: ESOPHAGOGASTRODUODENOSCOPY (EGD);  Surgeon: Jazmyne Lynn DO;  Location: [facility];  Service: Gastroenterology    TONSILLECTOMY      WISDOM TOOTH EXTRACTION     [3] "   Current Outpatient Medications   Medication Sig Dispense Refill    albuterol (Ventolin HFA) 90 mcg/act inhaler Inhale 2 puffs every 4 (four) hours as needed for wheezing or shortness of breath 18 g 2    atorvastatin (LIPITOR) 20 mg tablet Take 1 tablet (20 mg total) by mouth daily 30 tablet 5    budesonide-formoterol (SYMBICORT) 160-4.5 mcg/act inhaler Inhale 2 puffs 2 (two) times a day Rinse mouth after use. 10.2 g 5    celecoxib (CeleBREX) 200 mg capsule Take 1 capsule (200 mg total) by mouth 2 (two) times a day 60 capsule 5    diltiazem (TIAZAC) 240 MG 24 hr capsule Take 1 capsule (240 mg total) by mouth daily 90 capsule 3    escitalopram (LEXAPRO) 5 mg tablet Take 1 tablet (5 mg total) by mouth daily 30 tablet 3    hydroCHLOROthiazide 12.5 mg tablet Take 1 tablet (12.5 mg total) by mouth daily 30 tablet 3    losartan (COZAAR) 100 MG tablet Take 1 tablet (100 mg total) by mouth daily 90 tablet 1    omeprazole (PriLOSEC) 40 MG capsule Take 1 capsule (40 mg total) by mouth daily before breakfast 30 capsule 5    prednisoLONE acetate (PRED FORTE) 1 % ophthalmic suspension 3 drops in ears three times per week as needed for itching. 5 mL 1    Symbicort 160-4.5 MCG/ACT inhaler Inhale 2 puffs 2 (two) times a day Rinse mouth after use. 10.2 g 5    tamsulosin (FLOMAX) 0.4 mg Take 1 capsule (0.4 mg total) by mouth daily with dinner 30 capsule 0     No current facility-administered medications for this visit.     Facility-Administered Medications Ordered in Other Visits   Medication Dose Route Frequency Provider Last Rate Last Admin    fentaNYL injection   Intravenous PRN Tamir Chaney CRNA        midazolam (VERSED) injection   Intravenous PRN Tamir Chaney CRNA       [4]   Allergies  Allergen Reactions    Morphine Swelling   [5]   Family History  Problem Relation Name Age of Onset    Hyperlipidemia Mother Austin     Cancer Mother Austin         Malignant Neoplasm     Heart attack Father      Lung cancer Maternal  Grandmother      Lung cancer Maternal Grandfather      Heart attack Maternal Grandfather      No Known Problems Paternal Grandmother      No Known Problems Paternal Grandfather      Other Other Unk         Cardiac Disorder    Stroke Other Unk

## 2025-05-23 ENCOUNTER — TELEPHONE (OUTPATIENT)
Dept: CARDIOLOGY CLINIC | Facility: CLINIC | Age: 60
End: 2025-05-23

## 2025-05-23 NOTE — TELEPHONE ENCOUNTER
PA for dilTIAZem HCl ER Beads 240MG er capsules   NOT REQUIRED     Reason (screenshot if applicable)          Patient advised by          [] MyChart Message  [] Phone call   []LMOM  []L/M to call office as no active Communication consent on file  []Unable to leave detailed message as VM not approved on Communication consent       Pharmacy advised by    []Fax  []Phone call

## 2025-05-23 NOTE — TELEPHONE ENCOUNTER
PA for diltiazem 240 mg SUBMITTED to [employer]    via    [x]CMM-KEY: [id number]   []Surescripts-Case ID #   []Availity-Auth ID # NDC #  []Faxed to plan   []Other website   []Phone call Case ID #     []PA sent as URGENT    All office notes, labs and other pertaining documents and studies sent. Clinical questions answered. Awaiting determination from insurance company.     Turnaround time for your insurance to make a decision on your Prior Authorization can take 7-21 business days.               dilt

## 2025-05-23 NOTE — TELEPHONE ENCOUNTER
Fax received from [facility] requesting prior auth for Diltiazem ER 240mg capsules.    KEY: [id number]    Scanned into chart.

## 2025-05-24 ENCOUNTER — HOSPITAL ENCOUNTER (EMERGENCY)
Facility: HOSPITAL | Age: 60
Discharge: HOME | End: 2025-05-24
Attending: EMERGENCY MEDICINE | Admitting: EMERGENCY MEDICINE
Payer: MEDICARE

## 2025-05-24 ENCOUNTER — APPOINTMENT (EMERGENCY)
Dept: CT IMAGING | Facility: HOSPITAL | Age: 60
End: 2025-05-24
Payer: MEDICARE

## 2025-05-24 VITALS
HEART RATE: 75 BPM | DIASTOLIC BLOOD PRESSURE: 74 MMHG | RESPIRATION RATE: 18 BRPM | SYSTOLIC BLOOD PRESSURE: 156 MMHG | OXYGEN SATURATION: 92 % | TEMPERATURE: 98.2 F

## 2025-05-24 DIAGNOSIS — M54.50 ACUTE EXACERBATION OF CHRONIC LOW BACK PAIN: Primary | ICD-10-CM

## 2025-05-24 DIAGNOSIS — G89.29 ACUTE EXACERBATION OF CHRONIC LOW BACK PAIN: Primary | ICD-10-CM

## 2025-05-24 DIAGNOSIS — M54.16 LUMBAR RADICULOPATHY: ICD-10-CM

## 2025-05-24 DIAGNOSIS — M51.26 LUMBAR DISC HERNIATION: ICD-10-CM

## 2025-05-24 LAB
ALBUMIN SERPL BCG-MCNC: 4.5 G/DL (ref 3.5–5)
ALP SERPL-CCNC: 61 U/L (ref 34–104)
ALT SERPL W P-5'-P-CCNC: 28 U/L (ref 7–52)
ANION GAP SERPL CALCULATED.3IONS-SCNC: 7 MMOL/L (ref 4–13)
AST SERPL W P-5'-P-CCNC: 23 U/L (ref 13–39)
BACTERIA UR QL AUTO: ABNORMAL /HPF
BASOPHILS # BLD AUTO: 0.04 THOUSANDS/ÂΜL (ref 0–0.1)
BASOPHILS NFR BLD AUTO: 1 % (ref 0–1)
BILIRUB SERPL-MCNC: 0.6 MG/DL (ref 0.2–1)
BILIRUB UR QL STRIP: NEGATIVE
BUN SERPL-MCNC: 19 MG/DL (ref 5–25)
CALCIUM SERPL-MCNC: 9.7 MG/DL (ref 8.4–10.2)
CHLORIDE SERPL-SCNC: 107 MMOL/L (ref 96–108)
CLARITY UR: CLEAR
CO2 SERPL-SCNC: 26 MMOL/L (ref 21–32)
COLOR UR: YELLOW
CREAT SERPL-MCNC: 1 MG/DL (ref 0.6–1.3)
EOSINOPHIL # BLD AUTO: 0.06 THOUSAND/ÂΜL (ref 0–0.61)
EOSINOPHIL NFR BLD AUTO: 1 % (ref 0–6)
ERYTHROCYTE [DISTWIDTH] IN BLOOD BY AUTOMATED COUNT: 13.7 % (ref 11.6–15.1)
GFR SERPL CREATININE-BSD FRML MDRD: 80 ML/MIN/1.73SQ M
GLUCOSE SERPL-MCNC: 108 MG/DL (ref 65–140)
GLUCOSE UR STRIP-MCNC: ABNORMAL MG/DL
HCT VFR BLD AUTO: 42.2 % (ref 36.5–49.3)
HGB BLD-MCNC: 14.2 G/DL (ref 12–17)
HGB UR QL STRIP.AUTO: NEGATIVE
IMM GRANULOCYTES # BLD AUTO: 0.02 THOUSAND/UL (ref 0–0.2)
IMM GRANULOCYTES NFR BLD AUTO: 0 % (ref 0–2)
KETONES UR STRIP-MCNC: NEGATIVE MG/DL
LEUKOCYTE ESTERASE UR QL STRIP: NEGATIVE
LYMPHOCYTES # BLD AUTO: 1.31 THOUSANDS/ÂΜL (ref 0.6–4.47)
LYMPHOCYTES NFR BLD AUTO: 23 % (ref 14–44)
MAGNESIUM SERPL-MCNC: 2.1 MG/DL (ref 1.9–2.7)
MCH RBC QN AUTO: 31.6 PG (ref 26.8–34.3)
MCHC RBC AUTO-ENTMCNC: 33.6 G/DL (ref 31.4–37.4)
MCV RBC AUTO: 94 FL (ref 82–98)
MONOCYTES # BLD AUTO: 0.44 THOUSAND/ÂΜL (ref 0.17–1.22)
MONOCYTES NFR BLD AUTO: 8 % (ref 4–12)
MUCOUS THREADS UR QL AUTO: ABNORMAL
NEUTROPHILS # BLD AUTO: 3.73 THOUSANDS/ÂΜL (ref 1.85–7.62)
NEUTS SEG NFR BLD AUTO: 67 % (ref 43–75)
NITRITE UR QL STRIP: NEGATIVE
NON-SQ EPI CELLS URNS QL MICRO: ABNORMAL /HPF
NRBC BLD AUTO-RTO: 0 /100 WBCS
PH UR STRIP.AUTO: 7 [PH]
PLATELET # BLD AUTO: 155 THOUSANDS/UL (ref 149–390)
PMV BLD AUTO: 10.5 FL (ref 8.9–12.7)
POTASSIUM SERPL-SCNC: 3.3 MMOL/L (ref 3.5–5.3)
PROT SERPL-MCNC: 7 G/DL (ref 6.4–8.4)
PROT UR STRIP-MCNC: ABNORMAL MG/DL
RBC # BLD AUTO: 4.5 MILLION/UL (ref 3.88–5.62)
RBC #/AREA URNS AUTO: ABNORMAL /HPF
SODIUM SERPL-SCNC: 140 MMOL/L (ref 135–147)
SP GR UR STRIP.AUTO: 1.03 (ref 1–1.03)
UROBILINOGEN UR STRIP-ACNC: 2 MG/DL
WBC # BLD AUTO: 5.6 THOUSAND/UL (ref 4.31–10.16)
WBC #/AREA URNS AUTO: ABNORMAL /HPF

## 2025-05-24 PROCEDURE — 83735 ASSAY OF MAGNESIUM: CPT | Performed by: EMERGENCY MEDICINE

## 2025-05-24 PROCEDURE — 96375 TX/PRO/DX INJ NEW DRUG ADDON: CPT

## 2025-05-24 PROCEDURE — 74177 CT ABD & PELVIS W/CONTRAST: CPT

## 2025-05-24 PROCEDURE — 85025 COMPLETE CBC W/AUTO DIFF WBC: CPT | Performed by: EMERGENCY MEDICINE

## 2025-05-24 PROCEDURE — 99284 EMERGENCY DEPT VISIT MOD MDM: CPT

## 2025-05-24 PROCEDURE — 81001 URINALYSIS AUTO W/SCOPE: CPT | Performed by: EMERGENCY MEDICINE

## 2025-05-24 PROCEDURE — 96374 THER/PROPH/DIAG INJ IV PUSH: CPT

## 2025-05-24 PROCEDURE — 80053 COMPREHEN METABOLIC PANEL: CPT | Performed by: EMERGENCY MEDICINE

## 2025-05-24 PROCEDURE — 93005 ELECTROCARDIOGRAM TRACING: CPT

## 2025-05-24 PROCEDURE — 96361 HYDRATE IV INFUSION ADD-ON: CPT

## 2025-05-24 PROCEDURE — 36415 COLL VENOUS BLD VENIPUNCTURE: CPT | Performed by: EMERGENCY MEDICINE

## 2025-05-24 PROCEDURE — 99284 EMERGENCY DEPT VISIT MOD MDM: CPT | Performed by: EMERGENCY MEDICINE

## 2025-05-24 RX ORDER — METHOCARBAMOL 500 MG/1
500 TABLET, FILM COATED ORAL 2 TIMES DAILY
Qty: 14 TABLET | Refills: 0 | Status: SHIPPED | OUTPATIENT
Start: 2025-05-24

## 2025-05-24 RX ORDER — HYDROMORPHONE HCL/PF 1 MG/ML
0.5 SYRINGE (ML) INJECTION ONCE
Refills: 0 | Status: COMPLETED | OUTPATIENT
Start: 2025-05-24 | End: 2025-05-24

## 2025-05-24 RX ORDER — LIDOCAINE 50 MG/G
1 PATCH TOPICAL ONCE
Status: DISCONTINUED | OUTPATIENT
Start: 2025-05-24 | End: 2025-05-25 | Stop reason: HOSPADM

## 2025-05-24 RX ORDER — METHYLPREDNISOLONE 4 MG/1
TABLET ORAL
Qty: 21 TABLET | Refills: 0 | Status: SHIPPED | OUTPATIENT
Start: 2025-05-24 | End: 2025-06-10

## 2025-05-24 RX ORDER — POTASSIUM CHLORIDE 1500 MG/1
40 TABLET, EXTENDED RELEASE ORAL ONCE
Status: COMPLETED | OUTPATIENT
Start: 2025-05-24 | End: 2025-05-24

## 2025-05-24 RX ORDER — METHOCARBAMOL 500 MG/1
500 TABLET, FILM COATED ORAL ONCE
Status: COMPLETED | OUTPATIENT
Start: 2025-05-24 | End: 2025-05-24

## 2025-05-24 RX ORDER — ACETAMINOPHEN 325 MG/1
650 TABLET ORAL ONCE
Status: DISCONTINUED | OUTPATIENT
Start: 2025-05-24 | End: 2025-05-25 | Stop reason: HOSPADM

## 2025-05-24 RX ORDER — KETOROLAC TROMETHAMINE 30 MG/ML
15 INJECTION, SOLUTION INTRAMUSCULAR; INTRAVENOUS ONCE
Status: COMPLETED | OUTPATIENT
Start: 2025-05-24 | End: 2025-05-24

## 2025-05-24 RX ADMIN — KETOROLAC TROMETHAMINE 15 MG: 30 INJECTION, SOLUTION INTRAMUSCULAR; INTRAVENOUS at 20:54

## 2025-05-24 RX ADMIN — LIDOCAINE 1 PATCH: 700 PATCH TOPICAL at 20:54

## 2025-05-24 RX ADMIN — POTASSIUM CHLORIDE 40 MEQ: 1500 TABLET, EXTENDED RELEASE ORAL at 20:54

## 2025-05-24 RX ADMIN — HYDROMORPHONE HYDROCHLORIDE 0.5 MG: 1 INJECTION, SOLUTION INTRAMUSCULAR; INTRAVENOUS; SUBCUTANEOUS at 18:53

## 2025-05-24 RX ADMIN — IOHEXOL 85 ML: 350 INJECTION, SOLUTION INTRAVENOUS at 19:48

## 2025-05-24 RX ADMIN — METHOCARBAMOL 500 MG: 500 TABLET ORAL at 20:54

## 2025-05-24 RX ADMIN — SODIUM CHLORIDE 500 ML: 0.9 INJECTION, SOLUTION INTRAVENOUS at 18:55

## 2025-05-24 NOTE — ED PROVIDER NOTES
ED Disposition       None          Assessment & Plan       Medical Decision Making  Amount and/or Complexity of Data Reviewed  Labs: ordered.  Radiology: ordered.    Risk  OTC drugs.  Prescription drug management.        ED Course as of 05/24/25 2304   Sat May 24, 2025   1903 Differential diagnosis includes but is not limited to lumbosacral radiculopathy, disc herniation, doubt lumbosacral fracture.  Must also consider possibility of ureterolithiasis +/- UTI +/- urinary retention.  Strong peripheral pulses & absence of acute abdominal pain suggesting away from dissection.  He has had prior abdominal imaging without presence of abdominal aortic aneurysm in the past year.   1921 Blood pressure markedly improved.   1923 Urinalysis negative for leukocyte esterase, nitrites and blood.  He does have mild hypokalemia similar to that on prior testing.  Checking mag level.  Anticipate repletion.   2036 Patient notes that he recently tried to rise and ambulate.  He was unable to and describes tightness sensation throughout the thigh.  Suspect muscle spasm.  He does have mild tenderness without color or temperature change in this region.  Calf remains soft and nontender.    Have ordered ketorolac, lidocaine and methocarbamol at this point.  For reassessment following return of CT report.    If adequately improved for discharge anticipate prescription for steroid taper, muscle relaxer, comprehensive spine referral and PCP follow-up.  He is not currently under the care of pain and spine.  He has history of extensive treatment of both neck and back pain with fractures and surgeries.  Remotely he did receive epidural injections in the neck.       2043 Magnesium level normal.   2101 Radiologist reviewing images.   2121 CT report reviewed with patient.  He continues to note discomfort in the back and right thigh.  He is increasingly able to range the leg in bed.    Reviewed finding of disc herniation/progression since last summer  "at L4/L5.  He notes that he has seen multiple spine specialists in the past who did not feel he would be a surgical candidate.    At this time he would prefer discharge home if able to ambulate.  He initially suggested trying crutches.  Offered this versus walker and he preferred the latter.  He is able to perform all activities on 1 floor of his home.   2141 Ambulated with walker.  Pain minimal at this time.  He continues to appreciate tightness sensation in the thigh and describes region feeling numb.  He does have sensation to the areas.  Lower extremities without any abnormal sensation of persistent strong pulses.  5 out of 5 strength with knee flexion and extension.    We discussed disposition options.  Given progression of disc herniation and paresthesias in the leg and increased difficulty ambulating do feel stay overnight for pain control and PT assessment during the day would be appropriate.  He very much prefers discharge home and is confident he will be able to manage-getting around without falling.  Referral will be placed for comprehensive spine center.  We are unable to give him analgesics to go.  He may take acetaminophen extra strength overnight along with ibuprofen 600 mg after approximately 4 AM up to every 8 hours.     Encouraged return as needed worsening/new concerns.       Medications   sodium chloride 0.9 % bolus 500 mL (has no administration in time range)   HYDROmorphone (DILAUDID) injection 0.5 mg (has no administration in time range)   acetaminophen (TYLENOL) tablet 650 mg (has no administration in time range)       ED Risk Strat Scores                    No data recorded                            History of Present Illness       Chief Complaint   Patient presents with    Back Pain     Pt arrives via EMS c/o R sided back pain that started after \"twisting the wrong way\". Hx of sciatica. Pt writhing in pain       Past Medical History[1]   Past Surgical History[2]   Family History[3] "   Social History[4]   E-Cigarette/Vaping    E-Cigarette Use Never User       E-Cigarette/Vaping Substances    Nicotine No     THC No     CBD No     Flavoring No     Other No     Unknown No       I have reviewed and agree with the history as documented.     Daniel Wagner is a 60-year-old male who presents to the emergency department after experiencing intense right lower back pain.  He had been in his yard working with wood and plastic and pulling on the use when he developed severe sharp pain just above the hip area.  Discomfort radiated down the back of his right leg.  He was able to ambulate approximately 25 feet to his back steps and was uncertain he would be able to go further.  He described numbness sensation in the leg.  Discomfort has markedly calmed.  He notes that change in position exacerbates this.  He does have history of sciatica which regularly affects the left leg.  With ambulation he has had twinges of this towards his right side.  Today he did appreciate difficulty urinating and slow stream when he was able.  He suggests that this may be a result of not drinking a lot of water.  No fevers, chills, nausea or vomiting.    He notes that he was in the hospital a little over a week ago after having had rapid palpitations.  He was concerned he might be having a heart attack.  He was diagnosed with SVT, followed up with cardiologist afterward and advised change in medications.  (Reviewed note.  Amlodipine was to be discontinued and Cardizem to be started.) he has not yet been able to acquire the new medication due to difficulties with insurance and remains on his prior ones.  When pain was maximally intense shortly prior to coming in he did appreciate some rapid palpitations though no chest pain.    He notes that his legs have been somewhat swollen over the last 1-1/2 to 2 months.  This does reduce at night with elevation.  No change in this over the last few days.    He does have chronic abdominal  discomfort and attributes this to presence of 4 hernias.  He notes that he was evaluated for them and considering surgery although notes that the recent dysrhythmia may affect plans.    History of ureterolithiasis.  He has passed 2 stones spontaneously and describes having been found to have additional stones on subsequent ultrasound imaging.        Review of Systems   All other systems reviewed and are negative.          Objective       ED Triage Vitals [05/24/25 1828]   Temperature Pulse Blood Pressure Respirations SpO2 Patient Position - Orthostatic VS   (!) 97 °F (36.1 °C) 80 (!) 171/100 22 98 % Lying      Temp Source Heart Rate Source BP Location FiO2 (%) Pain Score    Axillary Monitor Left arm -- 10 - Worst Possible Pain      Vitals      Date and Time Temp Pulse SpO2 Resp BP Pain Score FACES Pain Rating User   05/24/25 1830 -- 77 99 % 18 163/131 -- --    05/24/25 1828 97 °F (36.1 °C) 80 98 % 22 171/100 10 - Worst Possible Pain --             Physical Exam  Vitals and nursing note reviewed.   Constitutional:       Appearance: Normal appearance.      Comments: Curled slightly to the right on stretcher.  Hesitant to change position.   HENT:      Head: Normocephalic.     Eyes:      Extraocular Movements: Extraocular movements intact.      Conjunctiva/sclera: Conjunctivae normal.       Cardiovascular:      Rate and Rhythm: Normal rate and regular rhythm.      Heart sounds: Normal heart sounds.   Pulmonary:      Effort: Pulmonary effort is normal.      Breath sounds: Normal breath sounds.   Abdominal:      General: There is no distension.      Palpations: Abdomen is soft.      Comments: Normoactive bowel sounds.  Tenderness over the entire lower abdomen, periumbilical and right upper quadrant regions.  Small reducible umbilical hernia appreciated.  Healed midline incision from prior operative intervention.     Musculoskeletal:      Right lower leg: Edema present.      Left lower leg: Edema present.       Comments: Mild edema in the calves and ankles.  +2 PT pulses.  No discoloration or increased warmth.  Calves nontender.  Symmetric intact  leg sensation.     Skin:     General: Skin is warm and dry.     Neurological:      Mental Status: He is alert and oriented to person, place, and time.      Comments: 5 out of 5 strength with bilateral hip flexion, dorsi and plantarflexion.  Right hip flexion elicits notable discomfort.   Psychiatric:         Mood and Affect: Mood normal.         Results Reviewed       Procedure Component Value Units Date/Time    UA w Reflex to Microscopic w Reflex to Culture [[id number]]     Lab Status: No result Specimen: Urine     CBC and differential [[id number]]     Lab Status: No result Specimen: Blood     Comprehensive metabolic panel [[id number]]     Lab Status: No result Specimen: Blood             CT abdomen pelvis with contrast    (Results Pending)       Procedures    ED Medication and Procedure Management   Prior to Admission Medications   Prescriptions Last Dose Informant Patient Reported? Taking?   Symbicort 160-4.5 MCG/ACT inhaler   No No   Sig: Inhale 2 puffs 2 (two) times a day Rinse mouth after use.   albuterol (Ventolin HFA) 90 mcg/act inhaler   No No   Sig: Inhale 2 puffs every 4 (four) hours as needed for wheezing or shortness of breath   atorvastatin (LIPITOR) 20 mg tablet   No No   Sig: Take 1 tablet (20 mg total) by mouth daily   budesonide-formoterol (SYMBICORT) 160-4.5 mcg/act inhaler   No No   Sig: Inhale 2 puffs 2 (two) times a day Rinse mouth after use.   celecoxib (CeleBREX) 200 mg capsule   No No   Sig: Take 1 capsule (200 mg total) by mouth 2 (two) times a day   diltiazem (TIAZAC) 240 MG 24 hr capsule   No No   Sig: Take 1 capsule (240 mg total) by mouth daily   escitalopram (LEXAPRO) 5 mg tablet   No No   Sig: Take 1 tablet (5 mg total) by mouth daily   hydroCHLOROthiazide 12.5 mg tablet   No No   Sig: Take 1 tablet (12.5 mg total) by mouth daily   losartan (COZAAR)  100 MG tablet   No No   Sig: Take 1 tablet (100 mg total) by mouth daily   omeprazole (PriLOSEC) 40 MG capsule   No No   Sig: Take 1 capsule (40 mg total) by mouth daily before breakfast   prednisoLONE acetate (PRED FORTE) 1 % ophthalmic suspension   No No   Sig: 3 drops in ears three times per week as needed for itching.   tamsulosin (FLOMAX) 0.4 mg  Self No No   Sig: Take 1 capsule (0.4 mg total) by mouth daily with dinner      Facility-Administered Medications: None     Patient's Medications   Discharge Prescriptions    No medications on file     No discharge procedures on file.  ED SEPSIS DOCUMENTATION              [1]   Past Medical History:  Diagnosis Date    Anxiety     Asthma     Peterson esophagus     Peterson's esophagus     Bowel obstruction (HCC)     Cervical radiculopathy     Chest pain     Last assessed - 7/31/17    Chronic GERD     Last assessed - 3/11/16    COPD (chronic obstructive pulmonary disease) (HCC)     Foot ulcer (HCC)     Hep C w/o coma, chronic (HCC)     treated    Hepatic disease     Cirrhosis    Hernia     History of neck problems     Hypercholesterolemia     Hypertension     Kidney disease     Kidney stones     Paresthesia of upper extremity     SOB (shortness of breath) on exertion     Wears glasses    [2]   Past Surgical History:  Procedure Laterality Date    APPENDECTOMY      ESOPHAGOGASTRODUODENOSCOPY      HERNIA REPAIR      Resolved     OK ESOPHAGOGASTRODUODENOSCOPY TRANSORAL DIAGNOSTIC N/A 11/30/2017    Procedure: ESOPHAGOGASTRODUODENOSCOPY (EGD);  Surgeon: Jazmyne Lynn DO;  Location: [facility];  Service: Gastroenterology    TONSILLECTOMY      WISDOM TOOTH EXTRACTION     [3]   Family History  Problem Relation Name Age of Onset    Hyperlipidemia Mother Cliff     Cancer Mother Cliff         Malignant Neoplasm     Heart attack Father      Lung cancer Maternal Grandmother      Lung cancer Maternal Grandfather      Heart attack Maternal Grandfather      No Known Problems Paternal  Grandmother      No Known Problems Paternal Grandfather      Other Other Unk         Cardiac Disorder    Stroke Other Unk    [4]   Social History  Tobacco Use    Smoking status: Every Day     Current packs/day: 0.50     Average packs/day: 0.5 packs/day for 48.4 years (24.2 ttl pk-yrs)     Types: Cigarettes     Start date: 1977    Smokeless tobacco: Current    Tobacco comments:     since 12 yrs old, Smokes less than 1 pack per day  per [employer]    Vaping Use    Vaping status: Never Used   Substance Use Topics    Alcohol use: Not Currently    Drug use: Yes     Types: Methamphetamines     Comment: last use 5 days ago        Zara Isabel MD  05/24/25 0303

## 2025-05-25 LAB
ATRIAL RATE: 73 BPM
P AXIS: 43 DEGREES
PR INTERVAL: 130 MS
QRS AXIS: 77 DEGREES
QRSD INTERVAL: 92 MS
QT INTERVAL: 398 MS
QTC INTERVAL: 438 MS
T WAVE AXIS: 51 DEGREES
VENTRICULAR RATE: 73 BPM

## 2025-05-25 PROCEDURE — 93010 ELECTROCARDIOGRAM REPORT: CPT | Performed by: INTERNAL MEDICINE

## 2025-05-25 NOTE — DISCHARGE INSTRUCTIONS
You may take acetaminophen 1 g orally up to every 8 hours to help with discomfort.  You may additionally take ibuprofen 600 mg (starting tonight after 3 AM) up to 8 hours until starting methylprednisolone.    Take methylprednisolone as directed.  You may additionally take methocarbamol 500 mg orally twice daily to help with pain.  Apply lidocaine patches (is obtained over-the-counter) for 12 hours daily.    Referral has been placed to the [facility] spine center.  You can anticipate receiving a phone call from them following the weekend to arrange for follow-up care through physical therapy.  Additionally contact your primary care physician for follow-up.    Return if you have any significant worsening or new concerns.

## 2025-05-27 ENCOUNTER — TELEPHONE (OUTPATIENT)
Dept: PHYSICAL THERAPY | Facility: OTHER | Age: 60
End: 2025-05-27

## 2025-05-27 ENCOUNTER — NURSE TRIAGE (OUTPATIENT)
Dept: PHYSICAL THERAPY | Facility: OTHER | Age: 60
End: 2025-05-27

## 2025-05-27 DIAGNOSIS — M54.41 ACUTE RIGHT-SIDED LOW BACK PAIN WITH RIGHT-SIDED SCIATICA: Primary | ICD-10-CM

## 2025-05-27 NOTE — TELEPHONE ENCOUNTER
Additional Information   Negative: Is this related to a work injury?   Negative: Is this related to an MVA?   Negative: Are you currently recieving homecare services?    Background - Initial Assessment  Clinical complaint: Pain is right low back radiates down right leg to just above the knee. Has numbness in right thigh. Pt states he also has pain on the left side/leg that is chronic. Right side started 5/24/25. NKI Pt did mention he was working in his yard, but had no injury. HX of sciatica, neck pain and back pain. Has HX of broken neck X2. States he has not had any SX's cervical or back. Is not currently following any specialists or doctors for this pain. In the past he was told he is not a surgical candidate. Has dealt with neck and back pain for at least 20 years. Is having trouble walking up the stairs, as he feels his leg will give out. Pain is constant and feels like a shock. Seen in ED 5/24/25  Date of onset: 5/24/25  Frequency of pain: constant  Quality of pain: shock    Protocols used: [facility] Protocol

## 2025-05-27 NOTE — TELEPHONE ENCOUNTER
Additional Information   Negative: Has the patient had unexplained weight loss?   Negative: Does the patient have a fever?   Negative: Is the patient experiencing urine retention?   Negative: Is the patient experiencing acute drop foot or paralysis?   Negative: Has the patient experienced major trauma? (fall from height, high speed collision, direct blow to spine) and is also experiencing nausea, light-headedness, or loss of consciousness?   Negative: Is the patient experiencing blood in sputum?   Negative: Is this a chronic condition?     Chronic left low back but states the right side is is completely new    Protocols used: [facility] Protocol    Comprehensive [facility] Program was reviewed in detail and what we can provide for their back pain.  Patient is agreeable to being triaged and would like to proceed with Physical Therapy.    Referral was placed for Physical Therapy at the [place] site. Patients information was sent to the  to make evaluation appointment. Patient made aware that the PT office  will be calling to schedule the appointment.  Patient was provided with the phone number to the PT office.    No further questions and/or concerns were voiced by the patient at this time. Patient states understanding of the referral that was placed.    Referral Closed.

## 2025-05-29 ENCOUNTER — EVALUATION (OUTPATIENT)
Dept: PHYSICAL THERAPY | Facility: CLINIC | Age: 60
End: 2025-05-29
Attending: PHYSICAL THERAPIST
Payer: MEDICARE

## 2025-05-29 VITALS
SYSTOLIC BLOOD PRESSURE: 170 MMHG | TEMPERATURE: 98.2 F | DIASTOLIC BLOOD PRESSURE: 80 MMHG | OXYGEN SATURATION: 97 % | HEART RATE: 80 BPM

## 2025-05-29 DIAGNOSIS — M54.41 ACUTE RIGHT-SIDED LOW BACK PAIN WITH RIGHT-SIDED SCIATICA: Primary | ICD-10-CM

## 2025-05-29 PROCEDURE — 97110 THERAPEUTIC EXERCISES: CPT | Performed by: PHYSICAL THERAPIST

## 2025-05-29 PROCEDURE — 97161 PT EVAL LOW COMPLEX 20 MIN: CPT | Performed by: PHYSICAL THERAPIST

## 2025-05-29 PROCEDURE — 97112 NEUROMUSCULAR REEDUCATION: CPT | Performed by: PHYSICAL THERAPIST

## 2025-05-29 NOTE — PROGRESS NOTES
PT Evaluation     Today's date: 2025  Patient name: Daniel Wagner  : 1965  MRN: [id number]  Referring provider: Gurdeep Wallis PT  Dx:   Encounter Diagnosis     ICD-10-CM    1. Acute right-sided low back pain with right-sided sciatica  M54.41                      Assessment  Impairments: abnormal gait, abnormal or restricted ROM, activity intolerance, impaired balance, impaired physical strength, lacks appropriate home exercise program, pain with function, poor posture , poor body mechanics, activity limitations and endurance    Assessment details: Pt presents with signs and symptoms synonymous of mechanical diagnosis of lumbar derangement with DP of REIL.  Pt presents with pain, decreased strength, decreased range, flexibility, as well as tolerance to activity and postural awareness.  Pt would benefit skilled PT intervention in order to address these impairments in order to be able to perform all desired activities with minimal to nil symptom exacerbation.  Based on today's session he will likely have a strong outcome, however if he fails to do so, appropriate referral will be performed.  Will reach out to primary care physician for POC.    Understanding of Dx/Px/POC: good     Prognosis: good    Goals  STG 4 Weeks:  Decrease pain at worst to 6  Improve range to LS ROM to mod  Improve strength to R LE to 4  Independent with HEP  LTG 8 Weeks:  Decrease pain at worst to 3  Improve range to LS ROM to min  Improve strength to R LE to 5.   Able to perform all desired activities with minimal to nil symptom exacerbation      Plan  Patient would benefit from: skilled physical therapy  Planned modality interventions: cryotherapy and thermotherapy: hydrocollator packs    Planned therapy interventions: joint mobilization, manual therapy, neuromuscular re-education, postural training, strengthening, stretching, therapeutic activities, therapeutic exercise, therapeutic training, transfer training, IADL  retraining, home exercise program, graded motor, graded exercise, graded activity, gait training, functional ROM exercises, flexibility, abdominal trunk stabilization, activity modification, behavior modification, balance and body mechanics training    Frequency: 2x week  Duration in weeks: 10  Treatment plan discussed with: patient        Subjective Evaluation    History of Present Illness  Date of onset: 5/29/2025  Mechanism of injury: Pt is a 60 yomale who presents today via PCPT stating that approximately 1 week ago, pt was pulling up plastic that stuck underneath some boards, he went to pull up and had immediate pain in R low back and glute.  Reports he had shortly symptoms to his R toes.  Pt reports that it had caused him to begin seizing and spasm ing on R trunk, R leg, made it to his house, awaited x 2 hours and called ambulance.  Pt reports that he went to [place], no imagery performed, he was given significant medication and this did assist.  When he returned home symptoms began to bother him again.  Pt reports that his symptoms are not significantly better.  Had completed steroid pack a few days ago.  Pt reports that R LE has been constant since injury.  Shine to R glute.  Having intermittent L LE symptoms.  Pt reports that sitting makes him worse, walking for short distances seems to help, but if its too much he will be worse.  Pt reports that bending forward, bending backwards, turning, twisting, lifting.  Pt reports not a great history of sleeping.  Unable to lay on preferred sides.  Pt reports laying on his back does not enjoy this.  Pt reports that he hasn't been laying on stomach secondary to a prior neck injury/surgery.  Pt reports that his goals are reduce pain, improve ability to sit, stand, walk, sleep, and get back to being mobile and active as he was prior to onset of injury.    Pt reports a long history of 2 years L side low back and LE symptoms, these symptoms are intermittent, was having L  sided symptoms that day, but historically he manages this.    No recent change in bowel or bladder.    Quality of life: good    Patient Goals  Patient goals for therapy: increased strength, return to sport/leisure activities, increased motion and decreased pain    Pain  Current pain ratin  At best pain ratin  At worst pain rating: 10  Quality: dull ache, radiating, sharp and tight  Relieving factors: medications, change in position and rest  Aggravating factors: sitting, standing, walking, lifting and stair climbing  Progression: no change      Diagnostic Tests  No diagnostic tests performed  Treatments  Previous treatment: medication        Objective     Active Range of Motion   Cervical/Thoracic Spine       Thoracic    Flexion:  with pain Restriction level: minimal  Extension:  Restriction level: maximal  Left lateral flexion:  Restriction level: minimal  Right lateral flexion:  Restriction level: minimal    Additional Active Range of Motion Details  Forward head, rounded shoulders, Lordosis  B/L Sensation intact to L3,4,5,S1,S2  L > R   DTR [place] R 1+, L 2+ [place] 1+ b/l  Postural correction R low back, numbness to R foot.  (Numbness L knee to foot normal for patient).   Repeated motion   Flex  Ext prone better.  Decrease better.  Improved hip R hip strength and low back pain.    SB R  SB L  LE Strength  Hip   L Flex Ext Abd Add all 5  R Flex 3+* Ext 5 Abd 5 Add 5  LE Screen knee flex 4-* R, rest 5/5.    Joint Mobility  Palpation  Sts + Slump on R               Precautions: PCPT Cat, HTN, Asthma/COPD, Possible MI still being evaluated.      Insurance ReEval POC expires Auth Status Total   Visits  Start date  Expiration date PT/OT + Visit Limit? Co-Insurance [facility] [employer] 25 Pending XX 25 XX XX No                                                        Visit/Unit Tracking  AUTH Status: Pending Date                Visits  Authed: XX Used 1                Remaining          "                  Manuals 5/29                                                                Neuro Re-Ed             Postural Ed  10 min            Cut Finger/Avoid Flexion actvities/bank account Performed             Sleeping positions                                                                  Ther Ex             REIL 3 x 10 better            Progression?                          GHADA/UESupport?             Modification?                                        Slump R   1\"            Ther Activity             LS ROM  Better Ext?Flex            R hip Flex 3+* to 4            R knee ext pain Better             Other concerns?                          Modalities                                              "

## 2025-06-05 ENCOUNTER — OFFICE VISIT (OUTPATIENT)
Dept: PHYSICAL THERAPY | Facility: CLINIC | Age: 60
End: 2025-06-05
Attending: PHYSICAL THERAPIST
Payer: MEDICARE

## 2025-06-05 DIAGNOSIS — M54.41 ACUTE RIGHT-SIDED LOW BACK PAIN WITH RIGHT-SIDED SCIATICA: Primary | ICD-10-CM

## 2025-06-05 PROCEDURE — 97140 MANUAL THERAPY 1/> REGIONS: CPT | Performed by: PHYSICAL THERAPIST

## 2025-06-05 PROCEDURE — 97110 THERAPEUTIC EXERCISES: CPT | Performed by: PHYSICAL THERAPIST

## 2025-06-05 NOTE — PROGRESS NOTES
Daily Note     Today's date: 2025  Patient name: Daniel Wagner  : 1965  MRN: [id number]  Referring provider: Gurdeep Wallis, PT  Dx:   Encounter Diagnosis     ICD-10-CM    1. Acute right-sided low back pain with right-sided sciatica  M54.41                      Subjective: Pt presents today stating that pain levels have changed, but he still has numbness in R LE all the way down.  Pt reports that there have been a few buckling incidents of R LE since last week.  Some L LE buckling, but this has not changed.  Has been active with activities within his yard and pool maintenance.  HEP 1-2x's a day since initial visit.        Objective: See treatment diary below  R LE numbness, no pain.    REIL NE.  REIL PT OP NE. Hip OC L PT OP NW.  PT mobilization ext G3-4 NW.  LTR to L, NW.        Assessment:  Pt at this time did not demonstrate DP however there is a change in strength and pain levels for the positive since last week.  Still having constant numbness in L LE.  Encouraged to continue to proceed with REIL for HEP and compliance with every 2-3 hours.  Consider sustained possible n.v.  Pt follows up with PT next week, if he is having no change in presentation within next week, consider ambulatory referral to pain management.       Precautions: PCPT Cat, HTN, Asthma/COPD, Possible MI still being evaluated.      Insurance ReEval POC expires Auth Status Total   Visits  Start date  Expiration date PT/OT + Visit Limit? Co-Insurance [facility]   [employer] [facility] Approved  8 25 8 No                                                        Visit/Unit Tracking  AUTH Status: Pending Date               Visits  Authed: 8 Used 1 2               Remaining  7 6                        Manuals            REIL PT OP, Hip OC L, LTR to L  TAS                                                  Neuro Re-Ed             Postural Ed  10 min            Cut Finger/Avoid Flexion actvities/bank  "account Performed             Sleeping positions                                                                  Ther Ex             REIL 3 x 10 better review           Progression?  See O                        GHADA/UESupport?  2 x 10 Increased worse           Modification?                                        Slump R   1\"            Ther Activity             LS ROM  Better Ext?Flex Pain flex L Leg, Ext better.             R hip Flex 3+* to 4 4            R knee ext pain Better  5/5 no pain.             Other concerns?                          Modalities                                                   "

## 2025-06-09 ENCOUNTER — TELEPHONE (OUTPATIENT)
Age: 60
End: 2025-06-09

## 2025-06-09 NOTE — TELEPHONE ENCOUNTER
The patient called the last day he had pt  the patient pulled something  in his lower back   his therapist was going to contact  Dr Wilson today and ask for a steroid pack   please call the patient when the medication is called in so he can pick it up  thank you

## 2025-06-10 DIAGNOSIS — G89.29 ACUTE EXACERBATION OF CHRONIC LOW BACK PAIN: ICD-10-CM

## 2025-06-10 DIAGNOSIS — M54.50 ACUTE EXACERBATION OF CHRONIC LOW BACK PAIN: ICD-10-CM

## 2025-06-10 DIAGNOSIS — M54.16 LUMBAR RADICULOPATHY: ICD-10-CM

## 2025-06-10 RX ORDER — METHYLPREDNISOLONE 4 MG/1
TABLET ORAL
Qty: 21 TABLET | Refills: 0 | Status: SHIPPED | OUTPATIENT
Start: 2025-06-10

## 2025-06-12 ENCOUNTER — APPOINTMENT (OUTPATIENT)
Dept: PHYSICAL THERAPY | Facility: CLINIC | Age: 60
End: 2025-06-12
Attending: PHYSICAL THERAPIST
Payer: MEDICARE

## 2025-06-12 NOTE — PROGRESS NOTES
Daily Note     Today's date: 2025  Patient name: Daneil Wagner  : 1965  MRN: [id number]  Referring provider: Gurdeep Wallis, PT  Dx:   No diagnosis found.                 Subjective: Pt presents today stating that pain levels have changed, but he still has numbness in R LE all the way down.  Pt reports that there have been a few buckling incidents of R LE since last week.  Some L LE buckling, but this has not changed.  Has been active with activities within his yard and pool maintenance.  HEP 1-2x's a day since initial visit.        Objective: See treatment diary below  R LE numbness, no pain.    REIL NE.  REIL PT OP NE. Hip OC L PT OP NW.  PT mobilization ext G3-4 NW.  LTR to L, NW.        Assessment:  Pt at this time did not demonstrate DP however there is a change in strength and pain levels for the positive since last week.  Still having constant numbness in L LE.  Encouraged to continue to proceed with REIL for HEP and compliance with every 2-3 hours.  Consider sustained possible n.v.  Pt follows up with PT next week, if he is having no change in presentation within next week, consider ambulatory referral to pain management.       Precautions: PCPT Cat, HTN, Asthma/COPD, Possible MI still being evaluated.      Insurance ReEval POC expires Auth Status Total   Visits  Start date  Expiration date PT/OT + Visit Limit? Co-Insurance [facility] 25 Approved  8 25 8 No                                                        Visit/Unit Tracking  AUTH Status: Pending Date              Visits  Authed: 8 Used 1 2 3              Remaining  7 6 5                       Manuals           REIL PT OP, Hip OC L, LTR to L  TAS                                                  Neuro Re-Ed             Postural Ed  10 min            Cut Finger/Avoid Flexion actvities/bank account Performed             Sleeping positions                                       "                            Ther Ex             REIL 3 x 10 better review           Progression?  See O                        GHADA/UESupport?  2 x 10 Increased worse           Modification?                                        Slump R   1\"            Ther Activity             LS ROM  Better Ext?Flex Pain flex L Leg, Ext better.             R hip Flex 3+* to 4 4            R knee ext pain Better  5/5 no pain.             Other concerns?                          Modalities                                                   "

## 2025-06-17 ENCOUNTER — OFFICE VISIT (OUTPATIENT)
Dept: FAMILY MEDICINE CLINIC | Facility: CLINIC | Age: 60
End: 2025-06-17
Payer: MEDICARE

## 2025-06-17 VITALS
DIASTOLIC BLOOD PRESSURE: 78 MMHG | RESPIRATION RATE: 18 BRPM | OXYGEN SATURATION: 97 % | SYSTOLIC BLOOD PRESSURE: 138 MMHG | HEART RATE: 78 BPM | HEIGHT: 73 IN | BODY MASS INDEX: 27.57 KG/M2 | WEIGHT: 208 LBS

## 2025-06-17 DIAGNOSIS — K21.9 CHRONIC GERD: ICD-10-CM

## 2025-06-17 DIAGNOSIS — E87.6 HYPOKALEMIA: ICD-10-CM

## 2025-06-17 DIAGNOSIS — R73.01 IFG (IMPAIRED FASTING GLUCOSE): ICD-10-CM

## 2025-06-17 DIAGNOSIS — I10 PRIMARY HYPERTENSION: Primary | ICD-10-CM

## 2025-06-17 DIAGNOSIS — R76.8 HEPATITIS C ANTIBODY DETECTED: ICD-10-CM

## 2025-06-17 DIAGNOSIS — E78.5 DYSLIPIDEMIA: ICD-10-CM

## 2025-06-17 DIAGNOSIS — R07.9 CHEST PAIN, UNSPECIFIED TYPE: ICD-10-CM

## 2025-06-17 DIAGNOSIS — J43.9 PULMONARY EMPHYSEMA, UNSPECIFIED EMPHYSEMA TYPE (HCC): ICD-10-CM

## 2025-06-17 DIAGNOSIS — F41.9 ANXIETY: ICD-10-CM

## 2025-06-17 PROCEDURE — 99214 OFFICE O/P EST MOD 30 MIN: CPT | Performed by: FAMILY MEDICINE

## 2025-06-17 NOTE — ASSESSMENT & PLAN NOTE
Need to check quantitative level and treat if needed. Patient sees Gastroenterology Dr Drummond.   Orders:  •  Hepatitis C RNA, Quantitative PCR; Future

## 2025-06-17 NOTE — PROGRESS NOTES
Name: Daniel Wagner      : 1965      MRN: [id number]  Encounter Provider: Richard Diaz MD  Encounter Date: 2025   Encounter department: [facility] MEDICINE  :  Assessment & Plan  Primary hypertension  Blood pressure lower. Continue HCTZ 12.5 mg daily, losartan 100 mg daily, and diltiazem 240 mg daily. Pt advised to follow a low Na diet and to exercise on a regular basis.        Orders:  •  Basic metabolic panel; Future    Dyslipidemia  LDL was 112 in 2024. Continue atorvastatin 20 mg daily at bedtime. Advised pt to follow a low cholesterol diet and to exercise on a regular basis.       Anxiety  Continue lexapro 5 mg daily.        Pulmonary emphysema, unspecified emphysema type (HCC)  No change. Continue Symbicort 160-4.5 2 puffs twice a day and rescue inhaler as needed. Patient was referred to Pulmonary for evaluation and management. Patient advised again to quit smoking.        Chronic GERD  Stable. Continue omeprazole 40 mg daily and GERD diet. Last EGD was in .        Hepatitis C antibody detected  Need to check quantitative level and treat if needed. Patient sees Gastroenterology Dr Drummond.   Orders:  •  Hepatitis C RNA, Quantitative PCR; Future    IFG (impaired fasting glucose)  Will check A1C.  Orders:  •  Hemoglobin A1C; Future    Hypokalemia  Recheck and if low, will start potassium pill.   Orders:  •  Basic metabolic panel; Future    Chest pain, unspecified type  Patient saw Cardiology and for stress test and echocardiogram next week.               History of Present Illness   Patient here for follow-up Hypertension, Hyperlipidemia, Anxiety, COPD, GERD, Impaired Fasting Glucose. Patient has been getting chest pain at times. Saw Cardiology and for stress test and echocardiogram on . Patient has follow-up with Cardiology on .Stress level a little better with lexapro 5 mg daily. Still has back pain and getting physical therapy. Going to see Pain Management  "in July 2025.       Review of Systems   Constitutional:  Negative for fatigue and unexpected weight change.   Respiratory:  Positive for shortness of breath. Negative for cough.    Cardiovascular:  Positive for chest pain.   Gastrointestinal:  Negative for abdominal pain, constipation, diarrhea and vomiting.   Musculoskeletal:  Positive for back pain and neck pain. Negative for arthralgias.   Neurological:  Negative for dizziness and headaches.   Psychiatric/Behavioral:  Positive for sleep disturbance. Negative for dysphoric mood. The patient is nervous/anxious.        Objective   /78 (BP Location: Left arm, Patient Position: Sitting, Cuff Size: Standard)   Pulse 78   Resp 18   Ht 6' 1\" (1.854 m)   Wt 94.3 kg (208 lb)   SpO2 97%   BMI 27.44 kg/m²      Physical Exam  Vitals and nursing note reviewed.   Constitutional:       Appearance: Normal appearance. He is normal weight.   Neck:      Vascular: No carotid bruit.     Cardiovascular:      Rate and Rhythm: Normal rate and regular rhythm.      Heart sounds: Normal heart sounds. No murmur heard.  Pulmonary:      Effort: Pulmonary effort is normal.      Breath sounds: Normal breath sounds. No wheezing.     Musculoskeletal:      Cervical back: Normal range of motion and neck supple. No muscular tenderness.      Right lower leg: No edema.      Left lower leg: No edema.   Lymphadenopathy:      Cervical: No cervical adenopathy.     Neurological:      Mental Status: He is alert.     Psychiatric:         Mood and Affect: Mood normal.         Behavior: Behavior normal.         Thought Content: Thought content normal.         Judgment: Judgment normal.         "

## 2025-06-17 NOTE — ASSESSMENT & PLAN NOTE
No change. Continue Symbicort 160-4.5 2 puffs twice a day and rescue inhaler as needed. Patient was referred to Pulmonary for evaluation and management. Patient advised again to quit smoking.

## 2025-06-17 NOTE — ASSESSMENT & PLAN NOTE
Blood pressure lower. Continue HCTZ 12.5 mg daily, losartan 100 mg daily, and diltiazem 240 mg daily. Pt advised to follow a low Na diet and to exercise on a regular basis.        Orders:  •  Basic metabolic panel; Future

## 2025-06-19 ENCOUNTER — OFFICE VISIT (OUTPATIENT)
Dept: PHYSICAL THERAPY | Facility: CLINIC | Age: 60
End: 2025-06-19
Attending: PHYSICAL THERAPIST
Payer: MEDICARE

## 2025-06-19 DIAGNOSIS — M54.41 ACUTE RIGHT-SIDED LOW BACK PAIN WITH RIGHT-SIDED SCIATICA: Primary | ICD-10-CM

## 2025-06-19 PROCEDURE — 97140 MANUAL THERAPY 1/> REGIONS: CPT | Performed by: PHYSICAL THERAPIST

## 2025-06-19 NOTE — PROGRESS NOTES
"Daily Note     Today's date: 2025  Patient name: Daniel Wagner  : 1965  MRN: [id number]  Referring provider: Gurdeep Wallis, ANDREW  Dx:   Encounter Diagnosis     ICD-10-CM    1. Acute right-sided low back pain with right-sided sciatica  M54.41                        Subjective: Patient reports that he had a severe increase pain after last visit; this remained elevated for 2-3 days. He took a full course of steroids without relief. R thigh and medial calf numbness and burning has continued. R leg has been \"giving out\" at certain.        Objective: See treatment diary below  R LE numbness, and pain  REIL: no worse  REIL w/ OP: no wrose  Lumbar mob: no worse  Sustained EXT: no worse  Rep R SG @ wall: increase; worse      Assessment:  No positive response to tx today. Lumbar extension loading is explored further with sustained positions on hi-low table. There is no change in RLE symptoms during this time. After this R SG @ wall was performed to explore a possible lateral component; this resulted in an increase in RLE symptoms. Flex rotation mob was not performed based on lack of response last visit. Patient is scheduled for a Pain Management consult in approx 4 weeks. I plan to discuss these findings with primary therapist.        Precautions: PCPT Cat, HTN, Asthma/COPD, Possible MI still being evaluated.      Insurance ReEval POC expires Auth Status Total   Visits  Start date  Expiration date PT/OT + Visit Limit? Co-Insurance Misc   [employer] 25 Approved  8 25 8 No                                                        Visit/Unit Tracking  AUTH Status: Pending Date              Visits  Authed: 8 Used 1 2 3              Remaining  7 6 5                       Manuals           REIL PT OP, Hip OC L, LTR to L  TAS                                     MDT assessment   30 min (see objective)          Neuro Re-Ed             Postural Ed  10 min          " "  Cut Finger/Avoid Flexion actvities/bank account Performed             Sleeping positions                                                                  Ther Ex             REIL 3 x 10 better review           Progression?  See O                        GHADA/UESupport?  2 x 10 Increased worse           Modification?                                        Slump R   1\"            Ther Activity             LS ROM  Better Ext?Flex Pain flex L Leg, Ext better.             R hip Flex 3+* to 4 4            R knee ext pain Better  5/5 no pain.             Other concerns?                          Modalities                                                   "

## 2025-06-23 ENCOUNTER — HOSPITAL ENCOUNTER (OUTPATIENT)
Dept: NON INVASIVE DIAGNOSTICS | Facility: HOSPITAL | Age: 60
Discharge: HOME | End: 2025-06-23
Payer: MEDICARE

## 2025-06-23 ENCOUNTER — TELEPHONE (OUTPATIENT)
Dept: SURGERY | Facility: CLINIC | Age: 60
End: 2025-06-23

## 2025-06-23 ENCOUNTER — LAB (OUTPATIENT)
Dept: LAB | Facility: HOSPITAL | Age: 60
End: 2025-06-23
Attending: INTERNAL MEDICINE
Payer: MEDICARE

## 2025-06-23 ENCOUNTER — HOSPITAL ENCOUNTER (OUTPATIENT)
Facility: HOSPITAL | Age: 60
Discharge: HOME | End: 2025-06-23
Payer: MEDICARE

## 2025-06-23 ENCOUNTER — RESULTS FOLLOW-UP (OUTPATIENT)
Dept: CARDIOLOGY CLINIC | Facility: CLINIC | Age: 60
End: 2025-06-23

## 2025-06-23 VITALS — HEIGHT: 71 IN | BODY MASS INDEX: 29.12 KG/M2 | WEIGHT: 208 LBS

## 2025-06-23 VITALS
DIASTOLIC BLOOD PRESSURE: 78 MMHG | WEIGHT: 207.89 LBS | HEIGHT: 73 IN | HEART RATE: 78 BPM | BODY MASS INDEX: 27.55 KG/M2 | SYSTOLIC BLOOD PRESSURE: 138 MMHG

## 2025-06-23 DIAGNOSIS — I10 PRIMARY HYPERTENSION: ICD-10-CM

## 2025-06-23 DIAGNOSIS — E87.6 HYPOKALEMIA: ICD-10-CM

## 2025-06-23 DIAGNOSIS — R07.2 PRECORDIAL PAIN: ICD-10-CM

## 2025-06-23 DIAGNOSIS — R73.01 IFG (IMPAIRED FASTING GLUCOSE): ICD-10-CM

## 2025-06-23 DIAGNOSIS — I47.10 SVT (SUPRAVENTRICULAR TACHYCARDIA) (HCC): ICD-10-CM

## 2025-06-23 DIAGNOSIS — Z86.19 HISTORY OF HEPATITIS C: ICD-10-CM

## 2025-06-23 DIAGNOSIS — R76.8 HEPATITIS C ANTIBODY DETECTED: ICD-10-CM

## 2025-06-23 LAB
ANION GAP SERPL CALCULATED.3IONS-SCNC: 8 MMOL/L (ref 4–13)
AORTIC ROOT: 3.6 CM
ASCENDING AORTA: 3.7 CM
BUN SERPL-MCNC: 14 MG/DL (ref 5–25)
CALCIUM SERPL-MCNC: 9.3 MG/DL (ref 8.4–10.2)
CHLORIDE SERPL-SCNC: 105 MMOL/L (ref 96–108)
CO2 SERPL-SCNC: 28 MMOL/L (ref 21–32)
CREAT SERPL-MCNC: 1.07 MG/DL (ref 0.6–1.3)
E WAVE DECELERATION TIME: 244 MS
E/A RATIO: 1.04
EST. AVERAGE GLUCOSE BLD GHB EST-MCNC: 146 MG/DL
FRACTIONAL SHORTENING: 37 (ref 28–44)
GFR SERPL CREATININE-BSD FRML MDRD: 74 ML/MIN/1.73SQ M
GLUCOSE P FAST SERPL-MCNC: 137 MG/DL (ref 65–99)
HBA1C MFR BLD: 6.7 %
INTERVENTRICULAR SEPTUM IN DIASTOLE (PARASTERNAL SHORT AXIS VIEW): 1.3 CM
INTERVENTRICULAR SEPTUM: 1.3 CM (ref 0.6–1.1)
LAAS-AP2: 14 CM2
LAAS-AP4: 16.3 CM2
LEFT ATRIUM SIZE: 3.1 CM
LEFT ATRIUM VOLUME (MOD BIPLANE): 32 ML
LEFT ATRIUM VOLUME INDEX (MOD BIPLANE): 14.6 ML/M2
LEFT INTERNAL DIMENSION IN SYSTOLE: 2.4 CM (ref 2.1–4)
LEFT VENTRICULAR INTERNAL DIMENSION IN DIASTOLE: 3.8 CM (ref 3.5–6)
LEFT VENTRICULAR POSTERIOR WALL IN END DIASTOLE: 1.2 CM
LEFT VENTRICULAR STROKE VOLUME: 42 ML
LV EF US.2D.A4C+ESTIMATED: 60 %
LVSV (TEICH): 42 ML
MAX HR PERCENT: 54 %
MAX HR: 87 BPM
MV E'TISSUE VEL-LAT: 11 CM/S
MV E'TISSUE VEL-SEP: 8 CM/S
MV PEAK A VEL: 0.73 M/S
MV PEAK E VEL: 76 CM/S
MV STENOSIS PRESSURE HALF TIME: 71 MS
MV VALVE AREA P 1/2 METHOD: 3.1
POTASSIUM SERPL-SCNC: 3.5 MMOL/L (ref 3.5–5.3)
RATE PRESSURE PRODUCT: NORMAL
RIGHT ATRIUM AREA SYSTOLE A4C: 12.2 CM2
RIGHT VENTRICLE ID DIMENSION: 3.4 CM
SL CV LEFT ATRIUM LENGTH A2C: 5.5 CM
SL CV LV EF: 65
SL CV PED ECHO LEFT VENTRICLE DIASTOLIC VOLUME (MOD BIPLANE) 2D: 62 ML
SL CV PED ECHO LEFT VENTRICLE SYSTOLIC VOLUME (MOD BIPLANE) 2D: 20 ML
SL CV REST NUCLEAR ISOTOPE DOSE: 11 MCI
SL CV STRESS NUCLEAR ISOTOPE DOSE: 32.8 MCI
SL CV STRESS RECOVERY BP: NORMAL MMHG
SL CV STRESS RECOVERY HR: 79 BPM
SL CV STRESS RECOVERY O2 SAT: 96 %
SODIUM SERPL-SCNC: 141 MMOL/L (ref 135–147)
SPECT HRT GATED+EF W RNC IV: 64 %
STRESS ANGINA INDEX: 0
STRESS BASELINE BP: NORMAL MMHG
STRESS BASELINE HR: 70 BPM
STRESS O2 SAT REST: 98 %
STRESS PEAK HR: 85 BPM
STRESS POST ESTIMATED WORKLOAD: 1 METS
STRESS POST O2 SAT PEAK: 98 %
STRESS POST PEAK BP: 160 MMHG
STRESS/REST PERFUSION RATIO: 1.28
TRICUSPID ANNULAR PLANE SYSTOLIC EXCURSION: 2 CM

## 2025-06-23 PROCEDURE — 93306 TTE W/DOPPLER COMPLETE: CPT | Performed by: INTERNAL MEDICINE

## 2025-06-23 PROCEDURE — 78452 HT MUSCLE IMAGE SPECT MULT: CPT | Performed by: INTERNAL MEDICINE

## 2025-06-23 PROCEDURE — A9502 TC99M TETROFOSMIN: HCPCS

## 2025-06-23 PROCEDURE — 93017 CV STRESS TEST TRACING ONLY: CPT

## 2025-06-23 PROCEDURE — 87522 HEPATITIS C REVRS TRNSCRPJ: CPT

## 2025-06-23 PROCEDURE — 93306 TTE W/DOPPLER COMPLETE: CPT

## 2025-06-23 PROCEDURE — 36415 COLL VENOUS BLD VENIPUNCTURE: CPT

## 2025-06-23 PROCEDURE — 78452 HT MUSCLE IMAGE SPECT MULT: CPT

## 2025-06-23 PROCEDURE — 93018 CV STRESS TEST I&R ONLY: CPT | Performed by: INTERNAL MEDICINE

## 2025-06-23 PROCEDURE — 80048 BASIC METABOLIC PNL TOTAL CA: CPT

## 2025-06-23 PROCEDURE — 93016 CV STRESS TEST SUPVJ ONLY: CPT | Performed by: INTERNAL MEDICINE

## 2025-06-23 PROCEDURE — 83036 HEMOGLOBIN GLYCOSYLATED A1C: CPT

## 2025-06-23 RX ORDER — REGADENOSON 0.08 MG/ML
0.4 INJECTION, SOLUTION INTRAVENOUS ONCE
Status: COMPLETED | OUTPATIENT
Start: 2025-06-23 | End: 2025-06-23

## 2025-06-23 RX ADMIN — REGADENOSON 0.4 MG: 0.08 INJECTION, SOLUTION INTRAVENOUS at 09:44

## 2025-06-23 NOTE — TELEPHONE ENCOUNTER
Patient came in to office wanting to speak with edgar about his BP however edgar is not in office today. Has been very high 200/160 yesterday and 180/90 today. Says he believes he had a stroke again this last week and now due to BP he would like to cancel and hold off on surgery until better controlled. I did mention he could keep surgery if he can see PCP or cardio this week and see if it is better controlled next week however he still feels better to hold off on surgery.    Edgar, Please cancel for now. Thank you.

## 2025-06-24 LAB
CHEST PAIN STATEMENT: NORMAL
MAX DIASTOLIC BP: 90 MMHG
MAX PREDICTED HEART RATE: 159 BPM
PROTOCOL NAME: NORMAL
REASON FOR TERMINATION: NORMAL
STRESS POST EXERCISE DUR MIN: 3 MIN
STRESS POST EXERCISE DUR SEC: 0 SEC
STRESS POST PEAK HR: 87 BPM
STRESS POST PEAK SYSTOLIC BP: 170 MMHG
TARGET HR FORMULA: NORMAL
TEST INDICATION: NORMAL

## 2025-06-25 LAB — HCV RNA SERPL NAA+PROBE-ACNC: NOT DETECTED K[IU]/ML

## 2025-06-26 PROBLEM — R73.03 PREDIABETES: Status: ACTIVE | Noted: 2025-03-05

## 2025-07-01 ENCOUNTER — OFFICE VISIT (OUTPATIENT)
Dept: CARDIOLOGY CLINIC | Facility: CLINIC | Age: 60
End: 2025-07-01
Payer: MEDICARE

## 2025-07-01 VITALS
HEIGHT: 71 IN | DIASTOLIC BLOOD PRESSURE: 92 MMHG | BODY MASS INDEX: 28.27 KG/M2 | SYSTOLIC BLOOD PRESSURE: 152 MMHG | WEIGHT: 201.9 LBS | OXYGEN SATURATION: 97 % | HEART RATE: 82 BPM

## 2025-07-01 DIAGNOSIS — I47.10 SVT (SUPRAVENTRICULAR TACHYCARDIA) (HCC): ICD-10-CM

## 2025-07-01 DIAGNOSIS — I10 PRIMARY HYPERTENSION: Primary | ICD-10-CM

## 2025-07-01 DIAGNOSIS — E78.5 DYSLIPIDEMIA: ICD-10-CM

## 2025-07-01 PROCEDURE — 99214 OFFICE O/P EST MOD 30 MIN: CPT | Performed by: INTERNAL MEDICINE

## 2025-07-01 RX ORDER — LOSARTAN POTASSIUM 100 MG/1
100 TABLET ORAL DAILY
Start: 2025-07-01

## 2025-07-01 RX ORDER — ATORVASTATIN CALCIUM 20 MG/1
20 TABLET, FILM COATED ORAL DAILY
Start: 2025-07-01

## 2025-07-01 RX ORDER — DILTIAZEM HYDROCHLORIDE 240 MG/1
240 CAPSULE, EXTENDED RELEASE ORAL DAILY
Start: 2025-07-01

## 2025-07-01 NOTE — PROGRESS NOTES
Cardiology Follow Up    Daniel Wagner  1965  [id number]  [facility] [place]  [address] [facility]  [place] [address] [phone]    No diagnosis found.    There are no diagnoses linked to this encounter.  I had the pleasure of seeing Daniel Wagner for a follow up visit.     INTERVAL HISTORY: none    History of the presenting illness, Discussion/Summary and My Plan are as follows:::    Daniel is a 60-year-old gentleman with hypertension, dyslipidemia, history of SVT-May 2025, tobacco use-about 40 pack years, history of cirrhosis with prior hepatitis C and alcohol use, now treated hepatitis C.    He had seen my partner Dr. Azevedo at the [place] office after having had a presentation to the ED where SVT was suspected by EMS but prior to chemical cardioversion, converted spontaneously.  At his visit, Dr. Azevedo appropriately switched his amlodipine to diltiazem and in the setting of his chronic risk factors, ordered an echocardiogram and a pharmacologic nuclear stress test both of which were unremarkable.    The patient wishes to switch to this office since this is closer to home.  2 weeks ago-admits to having had an episode of left-sided weakness and feeling very tired and forgetful at that time and hence laid in bed for a few days but did not seek attention in the ED.  He went off of all of his medications at that point.  Since then that weakness has improved but he has some numbness which he thinks is chronic in the right leg.  He still does not feel back to himself.  Blood pressures are elevated expectedly.    Plan:    Recent suspected neurologic symptoms: Symptoms are somewhat nonspecific and has multiple complaints but in the setting of known vascular risk factors including poorly controlled hypertension, dyslipidemia, tobacco use will have him go to the ER.  Message sent to the ER as well.    Hypertension: He is now willing to  restart his medications, for now we will only restart his diltiazem and losartan and not the hydrochlorothiazide yet.  Blood pressures are only moderately elevated at this visit.    Dyslipidemia: Agreeable to go back on the statin    History of SVT: This was never captured on ECG, but diltiazem should be effective at keeping it at bay, also-will do a cardiac rhythm monitor-sent to home as well to rule out any breakthrough episodes.  Has had sporadic palpitations recently but nothing sustained.    Tobacco cessation    Follow-up in 6 weeks.         Latest Reference Range & Units 01/07/14 02:47 03/26/15 08:40 11/14/16 09:45 11/25/24 10:18   Cholesterol See Comment mg/dL 181 233 180 175   Triglycerides See Comment mg/dL 99 134 93 78   HDL >=40 mg/dL 32 44 45 47   Non-HDL Cholesterol mg/dl    128   LDL Calculated 0 - 100 mg/dL 129 162 (H) 116 (H) 112 (H)   (H): Data is abnormally high     Latest Reference Range & Units 05/24/25 18:56 06/23/25 11:20   BUN 5 - 25 mg/dL 19 14   Creatinine 0.60 - 1.30 mg/dL 1.00 1.07      Latest Reference Range & Units 05/26/15 09:31 06/23/25 11:20   Hemoglobin A1C Normal 4.0-5.6%; PreDiabetic 5.7-6.4%; Diabetic >=6.5%; Glycemic control for adults with diabetes <7.0% % 5.5 6.7 (H)   (H): Data is abnormally high   Latest Reference Range & Units 11/25/24 10:18 05/12/25 15:31 05/24/25 18:56   Hemoglobin 12.0 - 17.0 g/dL 14.2 13.5 14.2   Hematocrit 36.5 - 49.3 % 44.3 41.2 42.2       Problem List[1]  Past Medical History[2]  Social History     Socioeconomic History    Marital status: Single     Spouse name: Not on file    Number of children: 4    Years of education: Not on file    Highest education level: Not on file   Occupational History    Occupation: Permanent Disability      Comment: [employer]-not looking for work    Tobacco Use    Smoking status: Every Day     Current packs/day: 0.50     Average packs/day: 0.5 packs/day for 48.5 years (24.2 ttl pk-yrs)     Types: Cigarettes     Start date: 1977     Smokeless tobacco: Current    Tobacco comments:     since 12 yrs old, Smokes less than 1 pack per day  per [employer]    Vaping Use    Vaping status: Never Used   Substance and Sexual Activity    Alcohol use: Not Currently    Drug use: Yes     Types: Methamphetamines     Comment: last use 5 days ago    Sexual activity: Yes   Other Topics Concern    Not on file   Social History Narrative    On permanent disability    Unemployed, not looking for work          Social Drivers of Health     Financial Resource Strain: Not on file   Food Insecurity: Not on file   Transportation Needs: Not on file   Physical Activity: Not on file   Stress: Not on file   Social Connections: Not on file   Intimate Partner Violence: Not on file   Housing Stability: Not on file      Family History[3]  Past Surgical History[4]  Current Medications[5]  Allergies   Allergen Reactions    Morphine Swelling       Imaging: Stress strip  Result Date: 6/24/2025  Narrative: Confirmed by JUDY CASTELLON ([id number]),  Faith Winston ([id number]) on 6/24/2025 12:48:00 PM    Echo complete w/ contrast if indicated  Result Date: 6/23/2025  Narrative:   Left Ventricle: Left ventricular cavity size is normal. Wall thickness is mildly increased. The left ventricular ejection fraction is 65%. Systolic function is normal. Wall motion is normal. Diastolic function is normal for age.   Right Ventricle: Right ventricular cavity size is normal. Systolic function is normal.   Aortic Valve: There is aortic valve sclerosis.     NM myocardial perfusion spect (rx stress and/or rest)  Result Date: 6/23/2025  Narrative:   Stress ECG: No ST deviation is noted. The ECG was not diagnostic due to pharmacological (vasodilator) stress. No ischemic changes are seen with vasodilator infusion   Stress Function: Left ventricular function post-stress is normal. Stress ejection fraction is 64%.   Perfusion: There is a left ventricular perfusion defect that is small in size present in the basal  "inferior location(s) that is paradoxical. The defect appears to be an artifact caused by diaphragmatic activity.   Stress Combined Conclusion: There is image artifact, without diagnostic evidence for perfusion abnormality. No evidence of ischemia or infarction       Review of Systems:  Review of Systems   Constitutional:  Positive for fatigue. Negative for activity change, appetite change, chills, diaphoresis, fever and unexpected weight change.   HENT: Negative.     Eyes: Negative.    Respiratory: Negative.     Cardiovascular: Negative.    Endocrine: Negative.        Physical Exam:  /80 (BP Location: Right arm, Patient Position: Sitting, Cuff Size: Standard)   Pulse 82   Ht 5' 11\" (1.803 m)   Wt 91.6 kg (201 lb 14.4 oz)   SpO2 97%   BMI 28.16 kg/m²   Physical Exam  Constitutional:       General: He is not in acute distress.     Appearance: He is not ill-appearing, toxic-appearing or diaphoretic.   HENT:      Mouth/Throat:      Mouth: Mucous membranes are moist.      Pharynx: No oropharyngeal exudate or posterior oropharyngeal erythema.   Neck:      Vascular: No carotid bruit.     Cardiovascular:      Rate and Rhythm: Normal rate and regular rhythm.      Heart sounds: No murmur heard.     No friction rub. No gallop.   Pulmonary:      Effort: Pulmonary effort is normal. No respiratory distress.      Breath sounds: No stridor. No wheezing or rhonchi.     Musculoskeletal:         General: No swelling, tenderness, deformity or signs of injury. Normal range of motion.      Cervical back: Normal range of motion. No rigidity or tenderness.   Lymphadenopathy:      Cervical: No cervical adenopathy.     Neurological:      Mental Status: He is alert.         This note was completed in part utilizing [employer] direct voice recognition software.   Grammatical errors, random word insertion, spelling mistakes, occasional wrong word or \"sound-alike\" substitutions and incomplete sentences may be an occasional " consequence of the system secondary to software limitations, ambient noise and hardware issues. At the time of dictation, efforts were made to edit, clarify and /or correct errors.  Please read the chart carefully and recognize, using context, where substitutions have occurred.  If you have any questions or concerns about the context, text or information contained within the body of this dictation, please contact myself, the provider, for further clarification.       [1]   Patient Active Problem List  Diagnosis    Allergic rhinitis    Peterson esophagus    Cervical radiculopathy    Cervical spondylosis    Cervical stenosis of spinal canal    Chronic GERD    Cirrhosis of liver (HCC)    Dyslipidemia    Panic disorder    Paresthesia of upper extremity    Paraspinal muscle spasm    Hypertension    Thrombocytopenia (HCC)    COPD (chronic obstructive pulmonary disease) (HCC)    Ventral hernia without obstruction or gangrene    Neck pain    Ear discharge of left ear    Prediabetes    Tobacco use disorder    Encounter for annual physical exam    Anxiety    Hepatitis C antibody detected    Hypokalemia    Chest pain   [2]   Past Medical History:  Diagnosis Date    Anxiety     Asthma     Peterson esophagus     Peterson's esophagus     Bowel obstruction (HCC)     Cervical radiculopathy     Chest pain     Last assessed - 7/31/17    Chronic GERD     Last assessed - 3/11/16    COPD (chronic obstructive pulmonary disease) (HCC)     Foot ulcer (HCC)     Hep C w/o coma, chronic (HCC)     treated    Hepatic disease     Cirrhosis    Hernia     History of neck problems     Hypercholesterolemia     Hypertension     Kidney disease     Kidney stones     Paresthesia of upper extremity     SOB (shortness of breath) on exertion     Wears glasses    [3]   Family History  Problem Relation Name Age of Onset    Hyperlipidemia Mother Turpin     Cancer Mother Turpin         Malignant Neoplasm     Heart attack Father      Lung cancer Maternal  Grandmother      Lung cancer Maternal Grandfather      Heart attack Maternal Grandfather      No Known Problems Paternal Grandmother      No Known Problems Paternal Grandfather      Other Other Unk         Cardiac Disorder    Stroke Other Unk    [4]   Past Surgical History:  Procedure Laterality Date    APPENDECTOMY      ESOPHAGOGASTRODUODENOSCOPY      HERNIA REPAIR      Resolved     AK ESOPHAGOGASTRODUODENOSCOPY TRANSORAL DIAGNOSTIC N/A 11/30/2017    Procedure: ESOPHAGOGASTRODUODENOSCOPY (EGD);  Surgeon: Jazmyne Lynn DO;  Location: [facility];  Service: Gastroenterology    TONSILLECTOMY      WISDOM TOOTH EXTRACTION     [5]   Current Outpatient Medications:     albuterol (Ventolin HFA) 90 mcg/act inhaler, Inhale 2 puffs every 4 (four) hours as needed for wheezing or shortness of breath, Disp: 18 g, Rfl: 2    budesonide-formoterol (SYMBICORT) 160-4.5 mcg/act inhaler, Inhale 2 puffs 2 (two) times a day Rinse mouth after use., Disp: 10.2 g, Rfl: 5    atorvastatin (LIPITOR) 20 mg tablet, Take 1 tablet (20 mg total) by mouth daily (Patient not taking: Reported on 7/1/2025), Disp: 30 tablet, Rfl: 5    celecoxib (CeleBREX) 200 mg capsule, Take 1 capsule (200 mg total) by mouth 2 (two) times a day (Patient not taking: Reported on 7/1/2025), Disp: 60 capsule, Rfl: 5    diltiazem (TIAZAC) 240 MG 24 hr capsule, Take 1 capsule (240 mg total) by mouth daily (Patient not taking: Reported on 7/1/2025), Disp: 90 capsule, Rfl: 3    escitalopram (LEXAPRO) 5 mg tablet, Take 1 tablet (5 mg total) by mouth daily (Patient not taking: Reported on 7/1/2025), Disp: 30 tablet, Rfl: 3    hydroCHLOROthiazide 12.5 mg tablet, Take 1 tablet (12.5 mg total) by mouth daily (Patient not taking: Reported on 7/1/2025), Disp: 30 tablet, Rfl: 3    losartan (COZAAR) 100 MG tablet, Take 1 tablet (100 mg total) by mouth daily (Patient not taking: Reported on 7/1/2025), Disp: 90 tablet, Rfl: 1    methocarbamol (ROBAXIN) 500 mg tablet, Take 1 tablet (500  mg total) by mouth 2 (two) times a day (Patient not taking: Reported on 7/1/2025), Disp: 14 tablet, Rfl: 0    methylPREDNISolone 4 MG tablet therapy pack, Use as directed on package (Patient not taking: Reported on 7/1/2025), Disp: 21 tablet, Rfl: 0    omeprazole (PriLOSEC) 40 MG capsule, Take 1 capsule (40 mg total) by mouth daily before breakfast (Patient not taking: Reported on 7/1/2025), Disp: 30 capsule, Rfl: 5    prednisoLONE acetate (PRED FORTE) 1 % ophthalmic suspension, 3 drops in ears three times per week as needed for itching. (Patient not taking: Reported on 7/1/2025), Disp: 5 mL, Rfl: 1    Symbicort 160-4.5 MCG/ACT inhaler, Inhale 2 puffs 2 (two) times a day Rinse mouth after use. (Patient not taking: Reported on 7/1/2025), Disp: 10.2 g, Rfl: 5    tamsulosin (FLOMAX) 0.4 mg, Take 1 capsule (0.4 mg total) by mouth daily with dinner (Patient not taking: Reported on 7/1/2025), Disp: 30 capsule, Rfl: 0  No current facility-administered medications for this visit.    Facility-Administered Medications Ordered in Other Visits:     fentaNYL injection, , Intravenous, PRN, Tamir Chaney CRNA    midazolam (VERSED) injection, , Intravenous, PRN, Tamir Chaney CRNA

## 2025-07-02 ENCOUNTER — HOSPITAL ENCOUNTER (EMERGENCY)
Facility: HOSPITAL | Age: 60
Discharge: LEFT BEFORE DISCHARGE | End: 2025-07-02
Attending: EMERGENCY MEDICINE | Admitting: EMERGENCY MEDICINE
Payer: MEDICARE

## 2025-07-02 ENCOUNTER — APPOINTMENT (EMERGENCY)
Dept: RADIOLOGY | Facility: HOSPITAL | Age: 60
End: 2025-07-02
Payer: MEDICARE

## 2025-07-02 ENCOUNTER — APPOINTMENT (EMERGENCY)
Dept: CT IMAGING | Facility: HOSPITAL | Age: 60
End: 2025-07-02
Payer: MEDICARE

## 2025-07-02 VITALS
DIASTOLIC BLOOD PRESSURE: 75 MMHG | SYSTOLIC BLOOD PRESSURE: 151 MMHG | OXYGEN SATURATION: 98 % | RESPIRATION RATE: 18 BRPM | HEART RATE: 76 BPM | TEMPERATURE: 97.6 F

## 2025-07-02 DIAGNOSIS — R93.0 ABNORMAL CT OF THE HEAD: ICD-10-CM

## 2025-07-02 DIAGNOSIS — R20.0 RIGHT LEG NUMBNESS: ICD-10-CM

## 2025-07-02 DIAGNOSIS — R29.90 STROKE-LIKE SYMPTOMS: Primary | ICD-10-CM

## 2025-07-02 DIAGNOSIS — R42 DIZZINESS: ICD-10-CM

## 2025-07-02 DIAGNOSIS — R53.1 GENERALIZED WEAKNESS: ICD-10-CM

## 2025-07-02 DIAGNOSIS — R47.81 SLURRED SPEECH: ICD-10-CM

## 2025-07-02 LAB
ALBUMIN SERPL BCG-MCNC: 3.9 G/DL (ref 3.5–5)
ALP SERPL-CCNC: 57 U/L (ref 34–104)
ALT SERPL W P-5'-P-CCNC: 13 U/L (ref 7–52)
ANION GAP SERPL CALCULATED.3IONS-SCNC: 5 MMOL/L (ref 4–13)
APTT PPP: 27 SECONDS (ref 23–34)
AST SERPL W P-5'-P-CCNC: 13 U/L (ref 13–39)
ATRIAL RATE: 79 BPM
BASOPHILS # BLD AUTO: 0.03 THOUSANDS/ÂΜL (ref 0–0.1)
BASOPHILS NFR BLD AUTO: 1 % (ref 0–1)
BILIRUB SERPL-MCNC: 0.44 MG/DL (ref 0.2–1)
BNP SERPL-MCNC: 33 PG/ML (ref 0–100)
BUN SERPL-MCNC: 17 MG/DL (ref 5–25)
CALCIUM SERPL-MCNC: 8.9 MG/DL (ref 8.4–10.2)
CARDIAC TROPONIN I PNL SERPL HS: 4 NG/L (ref ?–50)
CHLORIDE SERPL-SCNC: 107 MMOL/L (ref 96–108)
CHOLEST SERPL-MCNC: 173 MG/DL (ref ?–200)
CO2 SERPL-SCNC: 25 MMOL/L (ref 21–32)
CREAT SERPL-MCNC: 1.14 MG/DL (ref 0.6–1.3)
EOSINOPHIL # BLD AUTO: 0.07 THOUSAND/ÂΜL (ref 0–0.61)
EOSINOPHIL NFR BLD AUTO: 2 % (ref 0–6)
ERYTHROCYTE [DISTWIDTH] IN BLOOD BY AUTOMATED COUNT: 13.7 % (ref 11.6–15.1)
EST. AVERAGE GLUCOSE BLD GHB EST-MCNC: 146 MG/DL
GFR SERPL CREATININE-BSD FRML MDRD: 69 ML/MIN/1.73SQ M
GLUCOSE SERPL-MCNC: 125 MG/DL (ref 65–140)
GLUCOSE SERPL-MCNC: 161 MG/DL (ref 65–140)
HBA1C MFR BLD: 6.7 %
HCT VFR BLD AUTO: 43.1 % (ref 36.5–49.3)
HDLC SERPL-MCNC: 48 MG/DL
HGB BLD-MCNC: 14.5 G/DL (ref 12–17)
IMM GRANULOCYTES # BLD AUTO: 0.02 THOUSAND/UL (ref 0–0.2)
IMM GRANULOCYTES NFR BLD AUTO: 1 % (ref 0–2)
INR PPP: 0.95 (ref 0.85–1.19)
LDLC SERPL CALC-MCNC: 104 MG/DL (ref 0–100)
LYMPHOCYTES # BLD AUTO: 0.73 THOUSANDS/ÂΜL (ref 0.6–4.47)
LYMPHOCYTES NFR BLD AUTO: 18 % (ref 14–44)
MCH RBC QN AUTO: 32 PG (ref 26.8–34.3)
MCHC RBC AUTO-ENTMCNC: 33.6 G/DL (ref 31.4–37.4)
MCV RBC AUTO: 95 FL (ref 82–98)
MONOCYTES # BLD AUTO: 0.28 THOUSAND/ÂΜL (ref 0.17–1.22)
MONOCYTES NFR BLD AUTO: 7 % (ref 4–12)
NEUTROPHILS # BLD AUTO: 2.96 THOUSANDS/ÂΜL (ref 1.85–7.62)
NEUTS SEG NFR BLD AUTO: 71 % (ref 43–75)
NONHDLC SERPL-MCNC: 125 MG/DL
NRBC BLD AUTO-RTO: 0 /100 WBCS
P AXIS: 43 DEGREES
PLATELET # BLD AUTO: 162 THOUSANDS/UL (ref 149–390)
PMV BLD AUTO: 9.8 FL (ref 8.9–12.7)
POTASSIUM SERPL-SCNC: 3.6 MMOL/L (ref 3.5–5.3)
PR INTERVAL: 122 MS
PROT SERPL-MCNC: 6.4 G/DL (ref 6.4–8.4)
PROTHROMBIN TIME: 13.4 SECONDS (ref 12.3–15)
QRS AXIS: 76 DEGREES
QRSD INTERVAL: 88 MS
QT INTERVAL: 372 MS
QTC INTERVAL: 426 MS
RBC # BLD AUTO: 4.53 MILLION/UL (ref 3.88–5.62)
SODIUM SERPL-SCNC: 137 MMOL/L (ref 135–147)
T WAVE AXIS: 72 DEGREES
TRIGL SERPL-MCNC: 107 MG/DL (ref ?–150)
VENTRICULAR RATE: 79 BPM
WBC # BLD AUTO: 4.09 THOUSAND/UL (ref 4.31–10.16)

## 2025-07-02 PROCEDURE — 85025 COMPLETE CBC W/AUTO DIFF WBC: CPT | Performed by: EMERGENCY MEDICINE

## 2025-07-02 PROCEDURE — 83880 ASSAY OF NATRIURETIC PEPTIDE: CPT | Performed by: EMERGENCY MEDICINE

## 2025-07-02 PROCEDURE — 93010 ELECTROCARDIOGRAM REPORT: CPT | Performed by: INTERNAL MEDICINE

## 2025-07-02 PROCEDURE — 71045 X-RAY EXAM CHEST 1 VIEW: CPT

## 2025-07-02 PROCEDURE — 80061 LIPID PANEL: CPT | Performed by: EMERGENCY MEDICINE

## 2025-07-02 PROCEDURE — 36415 COLL VENOUS BLD VENIPUNCTURE: CPT | Performed by: EMERGENCY MEDICINE

## 2025-07-02 PROCEDURE — 93005 ELECTROCARDIOGRAM TRACING: CPT

## 2025-07-02 PROCEDURE — 96365 THER/PROPH/DIAG IV INF INIT: CPT

## 2025-07-02 PROCEDURE — 83036 HEMOGLOBIN GLYCOSYLATED A1C: CPT | Performed by: EMERGENCY MEDICINE

## 2025-07-02 PROCEDURE — 85730 THROMBOPLASTIN TIME PARTIAL: CPT | Performed by: EMERGENCY MEDICINE

## 2025-07-02 PROCEDURE — 85610 PROTHROMBIN TIME: CPT | Performed by: EMERGENCY MEDICINE

## 2025-07-02 PROCEDURE — 99285 EMERGENCY DEPT VISIT HI MDM: CPT | Performed by: EMERGENCY MEDICINE

## 2025-07-02 PROCEDURE — 84484 ASSAY OF TROPONIN QUANT: CPT | Performed by: EMERGENCY MEDICINE

## 2025-07-02 PROCEDURE — 82948 REAGENT STRIP/BLOOD GLUCOSE: CPT

## 2025-07-02 PROCEDURE — 80053 COMPREHEN METABOLIC PANEL: CPT | Performed by: EMERGENCY MEDICINE

## 2025-07-02 PROCEDURE — 70498 CT ANGIOGRAPHY NECK: CPT

## 2025-07-02 PROCEDURE — 99285 EMERGENCY DEPT VISIT HI MDM: CPT

## 2025-07-02 PROCEDURE — 70496 CT ANGIOGRAPHY HEAD: CPT

## 2025-07-02 RX ORDER — ATORVASTATIN CALCIUM 40 MG/1
40 TABLET, FILM COATED ORAL
Status: DISCONTINUED | OUTPATIENT
Start: 2025-07-02 | End: 2025-07-02

## 2025-07-02 RX ORDER — ASPIRIN 81 MG/1
324 TABLET, CHEWABLE ORAL ONCE
Status: COMPLETED | OUTPATIENT
Start: 2025-07-02 | End: 2025-07-02

## 2025-07-02 RX ORDER — ATORVASTATIN CALCIUM 40 MG/1
40 TABLET, FILM COATED ORAL DAILY
Qty: 30 TABLET | Refills: 0 | Status: SHIPPED | OUTPATIENT
Start: 2025-07-02

## 2025-07-02 RX ORDER — ATORVASTATIN CALCIUM 40 MG/1
40 TABLET, FILM COATED ORAL ONCE
Status: COMPLETED | OUTPATIENT
Start: 2025-07-02 | End: 2025-07-02

## 2025-07-02 RX ORDER — ASPIRIN 81 MG/1
81 TABLET, CHEWABLE ORAL DAILY
Qty: 30 TABLET | Refills: 0 | Status: SHIPPED | OUTPATIENT
Start: 2025-07-02

## 2025-07-02 RX ADMIN — ASPIRIN 324 MG: 81 TABLET, CHEWABLE ORAL at 14:59

## 2025-07-02 RX ADMIN — IOHEXOL 85 ML: 350 INJECTION, SOLUTION INTRAVENOUS at 12:39

## 2025-07-02 RX ADMIN — ATORVASTATIN CALCIUM 40 MG: 40 TABLET, FILM COATED ORAL at 14:59

## 2025-07-02 RX ADMIN — SODIUM CHLORIDE, SODIUM LACTATE, POTASSIUM CHLORIDE, AND CALCIUM CHLORIDE 1000 ML: .6; .31; .03; .02 INJECTION, SOLUTION INTRAVENOUS at 12:20

## 2025-07-02 NOTE — ED PROVIDER NOTES
Pt Name: Daniel Wagner  MRN: [id number]  Birthdate 1965  Age/Sex: 60 y.o. male  Date of evaluation: 7/2/2025  PCP: Richard Diaz MD    CHIEF COMPLAINT    Chief Complaint   Patient presents with    Medical Problem     Pt was sent by cardiologist for CT to r/o stroke. Reports slurred speech 2 weeks ago, generalized weakness, forgetful, HA, blurred vision. Pt stopped taking prescribed medications due to these symptoms about 2 weeks ago        FINAL IMPRESSION    Final diagnoses:   Stroke-like symptoms   Dizziness   Right leg numbness   Slurred speech   Generalized weakness   Abnormal CT of the head         DISPOSITION/PLAN    60-year-old male with strokelike symptoms as detailed below.  Vital signs reassuring, examination remarkable chiefly for numbness to the right lower extremity, otherwise nonspecific.  EKG not acutely ischemic, labs overall reassuring.  CT angiogram concerning for chronic otomastoiditis, vascular stenoses also noted.  No clinical signs or symptoms of acute otomastoiditis at this time.  Patient recommended to undergo MRI and be admitted for further stroke workup but refused as below.  Discharged AGAINST MEDICAL ADVICE, encouraged to return if he  changes his mind or has worsening symptoms, alternate out patient plan formed as second best option.  Time reflects when diagnosis was documented in both MDM as applicable and the Disposition within this note       Time User Action Codes Description Comment    7/2/2025  2:40 PM Minerva Kelly Add [R29.90] Stroke-like symptoms     7/2/2025  2:43 PM Roshan Guerrier Add [R42] Dizziness     7/2/2025  2:44 PM Roshan Guerrier Add [R20.0] Right leg numbness     7/2/2025  2:46 PM Roshan Guerrier Add [R47.81] Slurred speech     7/2/2025  2:46 PM Roshan Guerrier Add [R53.1] Generalized weakness     7/2/2025  2:47 PM Roshan Guerrier Modify [R29.90] Stroke-like symptoms     7/2/2025  2:48 PM Roshan Guerrier Add [R93.0] Abnormal CT of the  head           ED Disposition       ED Disposition   AMA    Condition   --    Date/Time   Wed Jul 2, 2025  2:46 PM    Comment   Date: 7/2/2025  Patient: Daniel Wagner  Admitted: 7/2/2025 10:41 AM  Attending Provider: Roshan Guerrier MD    Daniel Wagner or his authorized caregiver has made the decision for the patient to leave the emergency department against the advice of  his attending physician. He or his authorized caregiver has been informed and understands the inherent risks, including death, disability from another stroke that potentially could be prevented.  He or his authorized caregiver has decided to accept  the responsibility for this decision. Daniel Wagner and all necessary parties have been advised that he may return for further evaluation or treatment. His condition at time of discharge was guarded.  Daniel Wagner had current vital signs as follows:   /75   Pulse 76   Temp 97.6 °F (36.4 °C) (Oral)   Resp 18                Follow-up Information       Follow up With Specialties Details Why Contact Info Additional Information     [facility] Emergency Department Emergency Medicine Go to  If symptoms worsen or if you change your mind about admission for further testing at any time [address] [facility] [address]  [phone] [facility] Emergency Department, [address] [facility], [place], [place], [address]    Richard Diaz MD Family Medicine Call in 1 day To discuss this visit and schedule close follow-up [facility] [facility]  [address]  [facility] [address]  [phone]       [facility] Call today To discuss this visit and schedule close follow-up [address]  [address]  [facility] [address]  [phone] [facility] [address], [address], [place], [place], [address]   [phone]    [facility] Otol[facility]ology Call in 1  day To discuss this visit and schedule close follow-up [address] [facility]  [address]  [facility] [address] [phone] [facility], [address], [place] [address]                HPI    60 y.o. male presenting with dizziness, slurred speech, difficulty with memory, headache, blurred vision.  Patient states the symptoms began suddenly 2 weeks ago, he states that he felt very bad for about 3 days and was mostly in bed, slurred speech has resolved, dizziness is still present but is much improved and very mild, blurred vision completely resolved.  He does note some numbness to his right leg that started around the same time and is continued, can still feel pressure or touch there but states it feels different on that leg.  Denies focal weakness, trauma, fever, chest pain, shortness of breath, other symptoms.      Past Medical and Surgical History    Past Medical History[1]    Past Surgical History[2]    Family History[3]    Social History[4]        Allergies    Allergies[5]    Home Medications    Prior to Admission medications    Medication Sig Start Date End Date Taking? Authorizing Provider   albuterol (Ventolin HFA) 90 mcg/act inhaler Inhale 2 puffs every 4 (four) hours as needed for wheezing or shortness of breath 1/22/25   Richard Diaz MD   atorvastatin (LIPITOR) 20 mg tablet Take 1 tablet (20 mg total) by mouth daily 7/1/25   Beth Serrano MD   budesonide-formoterol (SYMBICORT) 160-4.5 mcg/act inhaler Inhale 2 puffs 2 (two) times a day Rinse mouth after use. 4/22/25   Richard Diaz MD   celecoxib (CeleBREX) 200 mg capsule Take 1 capsule (200 mg total) by mouth 2 (two) times a day  Patient not taking: Reported on 7/1/2025 4/22/25   Richard Diaz MD   diltiazem (TIAZAC) 240 MG 24 hr capsule Take 1 capsule (240 mg total) by mouth daily 7/1/25   Beth Serrano MD   escitalopram (LEXAPRO) 5 mg tablet Take 1 tablet (5 mg total) by mouth daily  Patient  not taking: Reported on 7/1/2025 5/1/25   Richard Diaz MD   hydroCHLOROthiazide 12.5 mg tablet Take 1 tablet (12.5 mg total) by mouth daily  Patient not taking: Reported on 7/1/2025 5/1/25   Richard Diaz MD   losartan (COZAAR) 100 MG tablet Take 1 tablet (100 mg total) by mouth daily 7/1/25   Beth Serrano MD   methocarbamol (ROBAXIN) 500 mg tablet Take 1 tablet (500 mg total) by mouth 2 (two) times a day  Patient not taking: Reported on 7/1/2025 5/24/25   Zara Isabel MD   methylPREDNISolone 4 MG tablet therapy pack Use as directed on package  Patient not taking: Reported on 7/1/2025 6/10/25   Richard Diaz MD   omeprazole (PriLOSEC) 40 MG capsule Take 1 capsule (40 mg total) by mouth daily before breakfast  Patient not taking: Reported on 7/1/2025 4/22/25 10/19/25  Richard Diaz MD   prednisoLONE acetate (PRED FORTE) 1 % ophthalmic suspension 3 drops in ears three times per week as needed for itching.  Patient not taking: Reported on 7/1/2025 5/15/25   Eddie Onofre PA-C   Symbicort 160-4.5 MCG/ACT inhaler Inhale 2 puffs 2 (two) times a day Rinse mouth after use.  Patient not taking: Reported on 7/1/2025 5/7/25   Richard Diaz MD   tamsulosin (FLOMAX) 0.4 mg Take 1 capsule (0.4 mg total) by mouth daily with dinner  Patient not taking: Reported on 7/1/2025 12/12/24   Minerva Fong PA-C           Review of Systems    Review of Systems   Constitutional:  Positive for fatigue. Negative for appetite change, chills and diaphoresis.   HENT:  Negative for drooling, facial swelling, trouble swallowing and voice change.    Eyes:  Positive for visual disturbance.   Respiratory:  Negative for apnea, shortness of breath and wheezing.    Cardiovascular:  Negative for chest pain and leg swelling.   Gastrointestinal:  Negative for abdominal distention, abdominal pain, diarrhea, nausea and vomiting.   Genitourinary:  Negative for dysuria and urgency.   Musculoskeletal:  Negative for arthralgias, back  pain, gait problem and neck pain.   Skin:  Negative for color change, rash and wound.   Neurological:  Positive for dizziness, speech difficulty, numbness and headaches. Negative for seizures and weakness.   Psychiatric/Behavioral:  Negative for agitation, behavioral problems and dysphoric mood. The patient is not nervous/anxious.            All other systems reviewed and negative.    Physical Exam      ED Triage Vitals [07/02/25 1048]   Temperature Pulse Respirations Blood Pressure SpO2   97.6 °F (36.4 °C) 85 18 (!) 180/94 98 %      Temp Source Heart Rate Source Patient Position - Orthostatic VS BP Location FiO2 (%)   Oral Monitor Sitting Left arm --      Pain Score       --               Physical Exam  Vitals and nursing note reviewed.   Constitutional:       General: He is not in acute distress.     Appearance: He is well-developed. He is not ill-appearing, toxic-appearing or diaphoretic.   HENT:      Head: Normocephalic and atraumatic.      Right Ear: External ear normal.      Left Ear: External ear normal.      Nose: Nose normal. No congestion or rhinorrhea.      Mouth/Throat:      Mouth: Mucous membranes are moist.      Pharynx: Oropharynx is clear. No oropharyngeal exudate or posterior oropharyngeal erythema.     Eyes:      Conjunctiva/sclera: Conjunctivae normal.      Pupils: Pupils are equal, round, and reactive to light.     Neck:      Vascular: No carotid bruit.      Trachea: No tracheal deviation.     Cardiovascular:      Rate and Rhythm: Normal rate and regular rhythm.      Pulses: Normal pulses.      Heart sounds: Normal heart sounds. No murmur heard.  Pulmonary:      Effort: Pulmonary effort is normal. No respiratory distress.      Breath sounds: Normal breath sounds. No stridor. No wheezing or rales.   Abdominal:      General: There is no distension.      Palpations: Abdomen is soft.      Tenderness: There is no abdominal tenderness. There is no guarding or rebound.     Musculoskeletal:          General: No deformity. Normal range of motion.      Cervical back: Normal range of motion and neck supple. No rigidity or tenderness.      Right lower leg: No edema.      Left lower leg: No edema.     Skin:     General: Skin is warm and dry.      Capillary Refill: Capillary refill takes less than 2 seconds.      Findings: No rash.     Neurological:      Mental Status: He is alert and oriented to person, place, and time.      Cranial Nerves: No cranial nerve deficit.      Sensory: Sensory deficit present.      Motor: No weakness.      Coordination: Coordination normal.      Gait: Gait normal.      Comments: Cranial nerves II through XII intact, 5 out of 5 strength in all extremities, normal speech and coordination.  Normal heel-to-shin, no dysdiadochokinesia, no visual field cuts.  Patient is endorsing altered and diminished sensation overlying the entire right lower extremity.  NIH stroke scale of 1 for numbness.  Ambulating with normal steady gait without difficulty or assistance   Psychiatric:         Behavior: Behavior normal.         Thought Content: Thought content normal.         Judgment: Judgment normal.              Diagnostic Results  EKG Interpretation    Rate:  79  BPM  Rhythm:  Normal Sinus Rhythm   Axis:  Normal   Intervals: Normal, no blocks, QTc  426 ms  Q waves:  No pathologic Q waves   T waves:  Normal   ST segments:  No significant elevations or depressions     Impression:  Normal sinus rhythm without evidence of acute ischemia or significant arrhythmia      EKG for comparison: EKG dated 24 May 2025 similar in character no major changes    EKG interpreted by me.       Labs:    Results Reviewed       Procedure Component Value Units Date/Time    Lipid panel [[id number]]  (Abnormal) Collected: 07/02/25 1152    Lab Status: Final result Specimen: Blood from Arm, Right Updated: 07/02/25 1518     Cholesterol 173 mg/dL      Triglycerides 107 mg/dL      HDL, Direct 48 mg/dL      LDL Calculated 104 mg/dL       Non-HDL-Chol (CHOL-HDL) 125 mg/dl     Hemoglobin A1C [[id number]] Collected: 07/02/25 1503    Lab Status: In process Specimen: Blood from Arm, Right Updated: 07/02/25 1508    B-Type Natriuretic Peptide(BNP) [[id number]]  (Normal) Collected: 07/02/25 1152    Lab Status: Final result Specimen: Blood from Arm, Right Updated: 07/02/25 1246     BNP 33 pg/mL     HS Troponin I 4hr [[id number]]     Lab Status: No result Specimen: Blood     HS Troponin I 2hr [[id number]]     Lab Status: No result Specimen: Blood     HS Troponin 0hr (reflex protocol) [[id number]]  (Normal) Collected: 07/02/25 1152    Lab Status: Final result Specimen: Blood from Arm, Right Updated: 07/02/25 1227     hs TnI 0hr 4 ng/L     Comprehensive metabolic panel [[id number]] Collected: 07/02/25 1152    Lab Status: Final result Specimen: Blood from Arm, Right Updated: 07/02/25 1221     Sodium 137 mmol/L      Potassium 3.6 mmol/L      Chloride 107 mmol/L      CO2 25 mmol/L      ANION GAP 5 mmol/L      BUN 17 mg/dL      Creatinine 1.14 mg/dL      Glucose 125 mg/dL      Calcium 8.9 mg/dL      AST 13 U/L      ALT 13 U/L      Alkaline Phosphatase 57 U/L      Total Protein 6.4 g/dL      Albumin 3.9 g/dL      Total Bilirubin 0.44 mg/dL      eGFR 69 ml/min/1.73sq m     Narrative:      National Kidney Disease Foundation guidelines for Chronic Kidney Disease (CKD):     Stage 1 with normal or high GFR (GFR > 90 mL/min/1.73 square meters)    Stage 2 Mild CKD (GFR = 60-89 mL/min/1.73 square meters)    Stage 3A Moderate CKD (GFR = 45-59 mL/min/1.73 square meters)    Stage 3B Moderate CKD (GFR = 30-44 mL/min/1.73 square meters)    Stage 4 Severe CKD (GFR = 15-29 mL/min/1.73 square meters)    Stage 5 End Stage CKD (GFR <15 mL/min/1.73 square meters)  Note: GFR calculation is accurate only with a steady state creatinine    Protime-INR [[id number]]  (Normal) Collected: 07/02/25 1152    Lab Status: Final result Specimen: Blood from Arm, Right Updated: 07/02/25 1218     Protime  13.4 seconds      INR 0.95    Narrative:      INR Therapeutic Range    Indication                                             INR Range      Atrial Fibrillation                                               2.0-3.0  Hypercoagulable State                                    2.0.2.3  Left Ventricular Asist Device                            2.0-3.0  Mechanical Heart Valve                                  -    Aortic(with afib, MI, embolism, HF, LA enlargement,    and/or coagulopathy)                                     2.0-3.0 (2.5-3.5)     Mitral                                                             2.5-3.5  Prosthetic/Bioprosthetic Heart Valve               2.0-3.0  Venous thromboembolism (VTE: VT, PE        2.0-3.0    APTT [[id number]]  (Normal) Collected: 07/02/25 1152    Lab Status: Final result Specimen: Blood from Arm, Right Updated: 07/02/25 1218     PTT 27 seconds     CBC and differential [[id number]]  (Abnormal) Collected: 07/02/25 1152    Lab Status: Final result Specimen: Blood from Arm, Right Updated: 07/02/25 1204     WBC 4.09 Thousand/uL      RBC 4.53 Million/uL      Hemoglobin 14.5 g/dL      Hematocrit 43.1 %      MCV 95 fL      MCH 32.0 pg      MCHC 33.6 g/dL      RDW 13.7 %      MPV 9.8 fL      Platelets 162 Thousands/uL      nRBC 0 /100 WBCs      Segmented % 71 %      Immature Grans % 1 %      Lymphocytes % 18 %      Monocytes % 7 %      Eosinophils Relative 2 %      Basophils Relative 1 %      Absolute Neutrophils 2.96 Thousands/µL      Absolute Immature Grans 0.02 Thousand/uL      Absolute Lymphocytes 0.73 Thousands/µL      Absolute Monocytes 0.28 Thousand/µL      Eosinophils Absolute 0.07 Thousand/µL      Basophils Absolute 0.03 Thousands/µL     Fingerstick Glucose (POCT) [[id number]]  (Abnormal) Collected: 07/02/25 1056    Lab Status: Final result Specimen: Blood Updated: 07/02/25 1056     POC Glucose 161 mg/dl             All labs reviewed and utilized in the medical decision making  process    Radiology:    CTA head and neck with and without contrast   Final Result      Within the confines discussed above:      1.  No acute intracranial hemorrhage, significant mass effect or midline shift. If patient's symptoms persist, consider MRI to evaluate for possible demyelinating/inflammatory etiologies as well as infarct, given clinical history.   2.  No proximal large vessel occlusion in the head and neck. Multifocal vascular stenoses, as detailed above, including focal moderate-marked stenosis at the origins of the vertebral arteries.   3.  Redemonstrated nonspecific large left mastoid effusion, similar to CT temporal bones 2/15/2025, noting findings likely reflective of chronic otomastoiditis with superimposed acute component not excluded by this examination. Previously, MRI brain IAC    protocol with and without intravenous contrast was suggested to exclude cholesteatoma, in addition to follow-up with ENT.      The study was marked in EPIC for immediate notification.                  Workstation performed: [id number]         XR chest 1 view portable    (Results Pending)   MRI brain wo contrast    (Results Pending)       All radiology studies independently viewed by me and interpreted by the radiologist.    Procedure    Procedures        ED Course of Care and Re-Assessments      Patient expressed concern that he was dehydrated, given IV fluids empirically with some improvement of symptoms.  CTA obtained, results as above.  Clinically, no evidence of acute otomastoiditis.  Discussed case with Dr. Weathers of stroke neurology, appreciate assistance from same.  Admission for MRI and further stroke workup was recommended with concern for TIA or stroke, however patient refused admission at this time, states he is unable to be admitted at this time and needs to take care of things.  We had an in-depth discussion of the risks of the strategy, with chief risk being new or worsening stroke causing severe  disability or death.  Patient understands these risks, still is adamant that he will not stay in the hospital tonight.  Has a second best but not equivalent option, patient was started on aspirin and statin from the ER, with home dose of Lipitor increased from 20-40 milligrams given loading dose of aspirin as well as 81 mg of aspirin per day afterwards.  Outpatient MRI and echo ordered by Dr. Weathers, outpatient referrals placed for neurology as well as ENT.  Patient encouraged to return immediately if he changes his mind at any point to be admitted for resumption of workup or if he has any worsening symptoms  Medications   lactated ringers bolus 1,000 mL (0 mL Intravenous Stopped 7/2/25 1320)   iohexol (OMNIPAQUE) 350 MG/ML injection (MULTI-DOSE) 85 mL (85 mL Intravenous Given 7/2/25 1239)   aspirin chewable tablet 324 mg (324 mg Oral Given 7/2/25 1459)   atorvastatin (LIPITOR) tablet 40 mg (40 mg Oral Given 7/2/25 1459)           PATIENT REFERRED TO:     [facility] Emergency Department  [address] [address]  [phone]  Go to   If symptoms worsen or if you change your mind about admission for further testing at any time    Richard Diaz MD  [address] [facility]  [address]  [place] [address]  [phone]    Call in 1 day  To discuss this visit and schedule close follow-up    [facility] [place]  [address] [place] [address]  [address]  [facility] [address] [phone]  Call today  To discuss this visit and schedule close follow-up    [facility]  [address] [facility]  [address]  [facility] [address] [phone]  Call in 1 day  To discuss this visit and schedule close follow-up      DISCHARGE MEDICATIONS:    Discharge Medication List as of 7/2/2025  2:49 PM        START taking these medications    Details   aspirin 81 mg chewable tablet Chew 1 tablet (81 mg total) daily, Starting Wed 7/2/2025, Print      !!  atorvastatin (LIPITOR) 40 mg tablet Take 1 tablet (40 mg total) by mouth daily, Starting Wed 7/2/2025, Print       !! - Potential duplicate medications found. Please discuss with provider.        CONTINUE these medications which have NOT CHANGED    Details   albuterol (Ventolin HFA) 90 mcg/act inhaler Inhale 2 puffs every 4 (four) hours as needed for wheezing or shortness of breath, Starting Wed 1/22/2025, Normal      !! atorvastatin (LIPITOR) 20 mg tablet Take 1 tablet (20 mg total) by mouth daily, Starting Tue 7/1/2025, No Print      !! budesonide-formoterol (SYMBICORT) 160-4.5 mcg/act inhaler Inhale 2 puffs 2 (two) times a day Rinse mouth after use., Starting Tue 4/22/2025, Normal      celecoxib (CeleBREX) 200 mg capsule Take 1 capsule (200 mg total) by mouth 2 (two) times a day, Starting Tue 4/22/2025, Normal      diltiazem (TIAZAC) 240 MG 24 hr capsule Take 1 capsule (240 mg total) by mouth daily, Starting Tue 7/1/2025, No Print      escitalopram (LEXAPRO) 5 mg tablet Take 1 tablet (5 mg total) by mouth daily, Starting Thu 5/1/2025, Normal      hydroCHLOROthiazide 12.5 mg tablet Take 1 tablet (12.5 mg total) by mouth daily, Starting Thu 5/1/2025, Normal      losartan (COZAAR) 100 MG tablet Take 1 tablet (100 mg total) by mouth daily, Starting Tue 7/1/2025, No Print      methocarbamol (ROBAXIN) 500 mg tablet Take 1 tablet (500 mg total) by mouth 2 (two) times a day, Starting Sat 5/24/2025, Normal      methylPREDNISolone 4 MG tablet therapy pack Use as directed on package, Normal      omeprazole (PriLOSEC) 40 MG capsule Take 1 capsule (40 mg total) by mouth daily before breakfast, Starting Tue 4/22/2025, Until Sun 10/19/2025, Normal      prednisoLONE acetate (PRED FORTE) 1 % ophthalmic suspension 3 drops in ears three times per week as needed for itching., Normal      !! Symbicort 160-4.5 MCG/ACT inhaler Inhale 2 puffs 2 (two) times a day Rinse mouth after use., Starting Wed 5/7/2025, Normal      tamsulosin  "(FLOMAX) 0.4 mg Take 1 capsule (0.4 mg total) by mouth daily with dinner, Starting Thu 12/12/2024, Normal       !! - Potential duplicate medications found. Please discuss with provider.          Outpatient Discharge Orders   MRI brain wo contrast   Standing Status: Future Standing Exp. Date: 10/02/25     Ambulatory Referral to Neurology   Standing Status: Future Standing Exp. Date: 07/02/26      Ambulatory Referral to Otolaryngology   Standing Status: Future Standing Exp. Date: 07/02/26      Echo complete w/ contrast if indicated   Standing Status: Future Standing Exp. Date: 10/02/25            Roshan Guerrier MD    Portions of the record may have been created with voice recognition software.  Occasional wrong word or \"sound alike\" substitutions may have occurred due to the inherent limitations of voice recognition software.  Please read the chart carefully and recognize, using context, where substitutions have occurred         [1]   Past Medical History:  Diagnosis Date    Anxiety     Asthma     Atrial fibrillation (HCC)     Peterson esophagus     Peterson's esophagus     Bowel obstruction (HCC)     Cervical radiculopathy     Chest pain     Last assessed - 7/31/17    Chronic GERD     Last assessed - 3/11/16    COPD (chronic obstructive pulmonary disease) (HCC)     Foot ulcer (HCC)     Hep C w/o coma, chronic (HCC)     treated    Hepatic disease     Cirrhosis    Hernia     History of neck problems     Hypercholesterolemia     Hypertension     Kidney disease     Kidney stones     Paresthesia of upper extremity     SOB (shortness of breath) on exertion     Wears glasses    [2]   Past Surgical History:  Procedure Laterality Date    APPENDECTOMY      ESOPHAGOGASTRODUODENOSCOPY      HERNIA REPAIR      Resolved     GA ESOPHAGOGASTRODUODENOSCOPY TRANSORAL DIAGNOSTIC N/A 11/30/2017    Procedure: ESOPHAGOGASTRODUODENOSCOPY (EGD);  Surgeon: Jazmyne Lynn DO;  Location: [facility];  Service: Gastroenterology    " TONSILLECTOMY      WISDOM TOOTH EXTRACTION     [3]   Family History  Problem Relation Name Age of Onset    Hyperlipidemia Mother [place]     Cancer Mother [place]         Malignant Neoplasm     Heart attack Father      Lung cancer Maternal Grandmother      Lung cancer Maternal Grandfather      Heart attack Maternal Grandfather      No Known Problems Paternal Grandmother      No Known Problems Paternal Grandfather      Other Other Unk         Cardiac Disorder    Stroke Other Unk    [4]   Social History  Tobacco Use    Smoking status: Every Day     Current packs/day: 0.50     Average packs/day: 0.5 packs/day for 48.5 years (24.2 ttl pk-yrs)     Types: Cigarettes     Start date: 1977    Smokeless tobacco: Current    Tobacco comments:     since 12 yrs old, Smokes less than 1 pack per day  per [employer]    Vaping Use    Vaping status: Never Used   Substance Use Topics    Alcohol use: Not Currently    Drug use: Not Currently     Types: Methamphetamines     Comment: last use 5 days ago   [5]   Allergies  Allergen Reactions    Morphine Swelling        Roshan Guerrier MD  07/02/25 1104

## 2025-07-17 ENCOUNTER — CONSULT (OUTPATIENT)
Dept: PAIN MEDICINE | Facility: CLINIC | Age: 60
End: 2025-07-17
Attending: ANESTHESIOLOGY
Payer: MEDICARE

## 2025-07-17 VITALS
BODY MASS INDEX: 28.28 KG/M2 | HEIGHT: 71 IN | DIASTOLIC BLOOD PRESSURE: 82 MMHG | HEART RATE: 74 BPM | WEIGHT: 202 LBS | SYSTOLIC BLOOD PRESSURE: 163 MMHG

## 2025-07-17 DIAGNOSIS — M54.16 LUMBAR RADICULOPATHY: ICD-10-CM

## 2025-07-17 DIAGNOSIS — M54.12 CERVICAL RADICULOPATHY: Primary | ICD-10-CM

## 2025-07-17 DIAGNOSIS — M54.41 ACUTE RIGHT-SIDED LOW BACK PAIN WITH RIGHT-SIDED SCIATICA: ICD-10-CM

## 2025-07-17 DIAGNOSIS — F41.9 ANXIETY: ICD-10-CM

## 2025-07-17 PROCEDURE — 99204 OFFICE O/P NEW MOD 45 MIN: CPT | Performed by: PHYSICAL MEDICINE & REHABILITATION

## 2025-07-17 RX ORDER — LORAZEPAM 0.5 MG/1
0.5 TABLET ORAL
Qty: 2 TABLET | Refills: 0 | Status: SHIPPED | OUTPATIENT
Start: 2025-07-17 | End: 2025-07-30 | Stop reason: SDUPTHER

## 2025-07-17 RX ORDER — DILTIAZEM HYDROCHLORIDE 240 MG/1
1 CAPSULE, COATED, EXTENDED RELEASE ORAL DAILY
COMMUNITY
Start: 2025-05-29 | End: 2025-07-31 | Stop reason: SDUPTHER

## 2025-07-17 RX ORDER — DULOXETIN HYDROCHLORIDE 30 MG/1
30 CAPSULE, DELAYED RELEASE ORAL DAILY
Qty: 30 CAPSULE | Refills: 1 | Status: SHIPPED | OUTPATIENT
Start: 2025-07-17 | End: 2025-07-31 | Stop reason: SDUPTHER

## 2025-07-17 NOTE — ASSESSMENT & PLAN NOTE
Orders:    DULoxetine (CYMBALTA) 30 mg delayed release capsule; Take 1 capsule (30 mg total) by mouth daily    XR spine cervical complete 4 or 5 vw non injury; Future    XR spine lumbar minimum 4 views non injury; Future    MRI cervical spine wo contrast; Future    MRI lumbar spine wo contrast; Future

## 2025-07-17 NOTE — PROGRESS NOTES
Name: Daniel Wagner      : 1965      MRN: [id number]  Encounter Provider: Mic Haley DO  Encounter Date: 2025   Encounter department: [facility]  :  Assessment & Plan  Acute right-sided low back pain with right-sided sciatica    Orders:    Ambulatory referral to Spine & Pain Management    DULoxetine (CYMBALTA) 30 mg delayed release capsule; Take 1 capsule (30 mg total) by mouth daily    XR spine cervical complete 4 or 5 vw non injury; Future    XR spine lumbar minimum 4 views non injury; Future    MRI cervical spine wo contrast; Future    MRI lumbar spine wo contrast; Future    Cervical radiculopathy    Orders:    DULoxetine (CYMBALTA) 30 mg delayed release capsule; Take 1 capsule (30 mg total) by mouth daily    XR spine cervical complete 4 or 5 vw non injury; Future    XR spine lumbar minimum 4 views non injury; Future    MRI cervical spine wo contrast; Future    MRI lumbar spine wo contrast; Future    Lumbar radiculopathy    Orders:    DULoxetine (CYMBALTA) 30 mg delayed release capsule; Take 1 capsule (30 mg total) by mouth daily    XR spine cervical complete 4 or 5 vw non injury; Future    XR spine lumbar minimum 4 views non injury; Future    MRI cervical spine wo contrast; Future    MRI lumbar spine wo contrast; Future      A/P  Mr. Wagner, a 60 year old male with pmhx of hepatits C, HTN, Allergic rhinitis, COPD presents today with neck and back pain. His neck pain is more chronic in nature and his low back pain is more acute with a recent exacerbation when doing yard work that caused intractable pain with significant pain and radicular symptoms bilaterally but worse in his right leg. The patient has exhausted physical therapy and otc medications for his pain which is refractory to these treatments. Based on his physical exam findings and clinical history diagnostic imaging including MRI and X rays of his low back and neck are indicated at this time. Based on the  results of these imaging further interventional procedures can be decided upon which include but are not limited to Epidural steroid injections, medial branch blocks.     In addition at this time we do believe that stronger medications are indicated for our patient's chronic pain is cymbalta 30 mg daily.           My impressions and treatment recommendations were discussed in detail with the patient who verbalized understanding and had no further questions.  Discharge instructions were provided. I personally saw and examined the patient and I agree with the above discussed plan of care.    History of Present Illness     Daniel Wagner is a 60 y.o. male who presents today with neck and back pain. His neck pain is more chronic but still a persistent issue. Had had neck pain for years hurt from accidents and falls. The pain Radiates with numbness and tingling present bilaterally. The pain is an 8/10 and has had this pain for twenty thirty years.   He drops a lot of things due to his hand weakness. 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 About a month and a half ago he was doing yardwork, pulled something and fell in his house and couldn't move with severe lbp and his wife took him to the hospital. The pain shot down both sides but more so on the right. The pain is a constant stabbing pain in nature and is a 6/10 now but gets to a ten.    He has tried otc medications, physical therapy and chiropractic therapy which have not helped his pain.     Review of Systems   Constitutional:  Negative for fever and unexpected weight change.   HENT:  Negative for trouble swallowing.    Eyes:  Negative for visual disturbance.   Respiratory:  Negative for shortness of breath and wheezing.    Cardiovascular:  Negative for chest pain and palpitations.    Gastrointestinal:  Negative for constipation, diarrhea, nausea and vomiting.   Endocrine: Negative for cold intolerance, heat intolerance and polydipsia.   Genitourinary:  Negative for difficulty urinating and frequency.   Musculoskeletal:  Positive for back pain and neck pain. Negative for arthralgias, gait problem, joint swelling and myalgias.        Right leg pain   Skin:  Negative for rash.   Neurological:  Negative for dizziness, seizures, syncope, weakness and headaches.   Hematological:  Does not bruise/bleed easily.   Psychiatric/Behavioral:  Negative for dysphoric mood.    All other systems reviewed and are negative.    Medical History Reviewed by provider this encounter:     .       Objective   There were no vitals taken for this visit.        Physical Exam  Constitutional: normal, well developed, well nourished, alert, in no distress and non-toxic and no overt pain behavior.  Eyes: anicteric  HEENT: grossly intact  Neck: supple, symmetric, trachea midline and no masses   Pulmonary: even and unlabored  Cardiovascular: No edema or pitting edema present  Skin: Normal without rashes or lesions and well hydrated  Psychiatric: Mood and affect appropriate  Neurologic: Cranial Nerves II-XII grossly intact  Musculoskeletal: slightly antalgic gait  Sensation: diminished in bilateral UE and in RLE more than left  Strength: UE and LE diminished   SLR positive

## 2025-07-30 ENCOUNTER — OFFICE VISIT (OUTPATIENT)
Dept: NEUROLOGY | Facility: CLINIC | Age: 60
End: 2025-07-30
Attending: EMERGENCY MEDICINE
Payer: MEDICARE

## 2025-07-30 PROBLEM — R41.3 MEMORY LOSS: Status: ACTIVE | Noted: 2025-07-30

## 2025-07-30 PROBLEM — Z86.73: Status: ACTIVE | Noted: 2025-07-30

## 2025-07-30 PROBLEM — I65.23 BILATERAL CAROTID ARTERY STENOSIS: Status: ACTIVE | Noted: 2025-07-30

## 2025-07-30 PROBLEM — I67.9 INTRACRANIAL VASCULAR STENOSIS: Status: ACTIVE | Noted: 2025-07-30

## 2025-07-31 DIAGNOSIS — R29.90 STROKE-LIKE SYMPTOMS: ICD-10-CM

## 2025-07-31 DIAGNOSIS — M54.2 NECK PAIN: ICD-10-CM

## 2025-07-31 DIAGNOSIS — R39.198 DIFFICULTY IN URINATION: ICD-10-CM

## 2025-07-31 DIAGNOSIS — K21.9 CHRONIC GERD: ICD-10-CM

## 2025-07-31 DIAGNOSIS — M54.12 CERVICAL RADICULOPATHY: ICD-10-CM

## 2025-07-31 DIAGNOSIS — I10 PRIMARY HYPERTENSION: ICD-10-CM

## 2025-07-31 DIAGNOSIS — M54.41 ACUTE RIGHT-SIDED LOW BACK PAIN WITH RIGHT-SIDED SCIATICA: ICD-10-CM

## 2025-07-31 DIAGNOSIS — M54.16 LUMBAR RADICULOPATHY: ICD-10-CM

## 2025-07-31 DIAGNOSIS — J44.9 CHRONIC OBSTRUCTIVE PULMONARY DISEASE, UNSPECIFIED COPD TYPE (HCC): ICD-10-CM

## 2025-07-31 DIAGNOSIS — F41.9 ANXIETY: ICD-10-CM

## 2025-07-31 RX ORDER — DULOXETIN HYDROCHLORIDE 30 MG/1
30 CAPSULE, DELAYED RELEASE ORAL DAILY
Qty: 30 CAPSULE | Refills: 1 | Status: SHIPPED | OUTPATIENT
Start: 2025-07-31

## 2025-07-31 RX ORDER — ALBUTEROL SULFATE 90 UG/1
2 INHALANT RESPIRATORY (INHALATION) EVERY 4 HOURS PRN
Qty: 18 G | Refills: 2 | Status: SHIPPED | OUTPATIENT
Start: 2025-07-31

## 2025-07-31 RX ORDER — CELECOXIB 200 MG/1
200 CAPSULE ORAL 2 TIMES DAILY
Qty: 60 CAPSULE | Refills: 5 | Status: SHIPPED | OUTPATIENT
Start: 2025-07-31

## 2025-07-31 RX ORDER — HYDROCHLOROTHIAZIDE 12.5 MG/1
12.5 TABLET ORAL DAILY
Qty: 30 TABLET | Refills: 3 | Status: SHIPPED | OUTPATIENT
Start: 2025-07-31

## 2025-07-31 RX ORDER — TAMSULOSIN HYDROCHLORIDE 0.4 MG/1
0.4 CAPSULE ORAL
Qty: 30 CAPSULE | Refills: 0 | Status: SHIPPED | OUTPATIENT
Start: 2025-07-31

## 2025-07-31 RX ORDER — ASPIRIN 81 MG/1
81 TABLET, CHEWABLE ORAL DAILY
Qty: 30 TABLET | Refills: 0 | Status: SHIPPED | OUTPATIENT
Start: 2025-07-31

## 2025-07-31 RX ORDER — ATORVASTATIN CALCIUM 40 MG/1
40 TABLET, FILM COATED ORAL DAILY
Qty: 30 TABLET | Refills: 0 | Status: SHIPPED | OUTPATIENT
Start: 2025-07-31

## 2025-07-31 RX ORDER — OMEPRAZOLE 40 MG/1
40 CAPSULE, DELAYED RELEASE ORAL
Qty: 30 CAPSULE | Refills: 5 | Status: SHIPPED | OUTPATIENT
Start: 2025-07-31 | End: 2026-01-27

## 2025-07-31 RX ORDER — DILTIAZEM HYDROCHLORIDE 240 MG/1
240 CAPSULE, COATED, EXTENDED RELEASE ORAL DAILY
Qty: 30 CAPSULE | Refills: 1 | Status: SHIPPED | OUTPATIENT
Start: 2025-07-31

## 2025-07-31 RX ORDER — ESCITALOPRAM OXALATE 5 MG/1
5 TABLET ORAL DAILY
Qty: 30 TABLET | Refills: 3 | Status: SHIPPED | OUTPATIENT
Start: 2025-07-31

## 2025-07-31 RX ORDER — LOSARTAN POTASSIUM 100 MG/1
100 TABLET ORAL DAILY
Qty: 90 TABLET | Refills: 2 | Status: SHIPPED | OUTPATIENT
Start: 2025-07-31

## 2025-08-13 ENCOUNTER — HOSPITAL ENCOUNTER (OUTPATIENT)
Dept: MRI IMAGING | Facility: HOSPITAL | Age: 60
Discharge: HOME | End: 2025-08-13
Payer: MEDICARE

## 2025-08-17 ENCOUNTER — HOSPITAL ENCOUNTER (OUTPATIENT)
Dept: MRI IMAGING | Facility: HOSPITAL | Age: 60
Discharge: HOME | End: 2025-08-17
Payer: MEDICARE

## 2025-08-17 DIAGNOSIS — M54.12 CERVICAL RADICULOPATHY: ICD-10-CM

## 2025-08-17 DIAGNOSIS — M54.41 ACUTE RIGHT-SIDED LOW BACK PAIN WITH RIGHT-SIDED SCIATICA: ICD-10-CM

## 2025-08-17 DIAGNOSIS — M54.16 LUMBAR RADICULOPATHY: ICD-10-CM

## 2025-08-17 PROCEDURE — 72148 MRI LUMBAR SPINE W/O DYE: CPT

## 2025-08-21 ENCOUNTER — TELEPHONE (OUTPATIENT)
Dept: PAIN MEDICINE | Facility: CLINIC | Age: 60
End: 2025-08-21

## 2025-08-21 ENCOUNTER — RESULTS FOLLOW-UP (OUTPATIENT)
Dept: RADIOLOGY | Facility: CLINIC | Age: 60
End: 2025-08-21